# Patient Record
Sex: FEMALE | Race: WHITE | NOT HISPANIC OR LATINO | Employment: OTHER | URBAN - METROPOLITAN AREA
[De-identification: names, ages, dates, MRNs, and addresses within clinical notes are randomized per-mention and may not be internally consistent; named-entity substitution may affect disease eponyms.]

---

## 2019-05-01 ENCOUNTER — TELEPHONE (OUTPATIENT)
Dept: OBGYN CLINIC | Facility: CLINIC | Age: 64
End: 2019-05-01

## 2019-05-21 ENCOUNTER — TELEPHONE (OUTPATIENT)
Dept: OBGYN CLINIC | Facility: CLINIC | Age: 64
End: 2019-05-21

## 2020-04-02 ENCOUNTER — TELEPHONE (OUTPATIENT)
Dept: FAMILY MEDICINE CLINIC | Facility: CLINIC | Age: 65
End: 2020-04-02

## 2020-04-24 ENCOUNTER — TELEMEDICINE (OUTPATIENT)
Dept: FAMILY MEDICINE CLINIC | Facility: CLINIC | Age: 65
End: 2020-04-24
Payer: COMMERCIAL

## 2020-04-24 VITALS — HEIGHT: 60 IN | WEIGHT: 180 LBS | BODY MASS INDEX: 35.34 KG/M2

## 2020-04-24 DIAGNOSIS — B34.9 ACUTE VIRAL SYNDROME: Primary | ICD-10-CM

## 2020-04-24 DIAGNOSIS — Z20.828 EXPOSURE TO SARS-ASSOCIATED CORONAVIRUS: ICD-10-CM

## 2020-04-24 PROBLEM — I83.11 VARICOSE VEINS OF BOTH LOWER EXTREMITIES WITH INFLAMMATION: Status: ACTIVE | Noted: 2020-04-24

## 2020-04-24 PROBLEM — D50.9 IRON DEFICIENCY ANEMIA: Status: ACTIVE | Noted: 2020-04-24

## 2020-04-24 PROBLEM — M17.0 PRIMARY OSTEOARTHRITIS OF BOTH KNEES: Status: ACTIVE | Noted: 2020-04-24

## 2020-04-24 PROBLEM — E55.9 VITAMIN D DEFICIENCY: Status: ACTIVE | Noted: 2020-04-24

## 2020-04-24 PROBLEM — M79.7 FIBROMYALGIA: Status: ACTIVE | Noted: 2020-04-24

## 2020-04-24 PROBLEM — I83.12 VARICOSE VEINS OF BOTH LOWER EXTREMITIES WITH INFLAMMATION: Status: ACTIVE | Noted: 2020-04-24

## 2020-04-24 PROBLEM — E78.00 HYPERCHOLESTEROLEMIA: Status: ACTIVE | Noted: 2020-04-24

## 2020-04-24 PROBLEM — M54.50 CHRONIC BILATERAL LOW BACK PAIN WITHOUT SCIATICA: Status: ACTIVE | Noted: 2020-04-24

## 2020-04-24 PROBLEM — G89.29 CHRONIC BILATERAL LOW BACK PAIN WITHOUT SCIATICA: Status: ACTIVE | Noted: 2020-04-24

## 2020-04-24 PROCEDURE — 99201 PR OFFICE OUTPATIENT NEW 10 MINUTES: CPT | Performed by: FAMILY MEDICINE

## 2020-04-24 PROCEDURE — U0003 INFECTIOUS AGENT DETECTION BY NUCLEIC ACID (DNA OR RNA); SEVERE ACUTE RESPIRATORY SYNDROME CORONAVIRUS 2 (SARS-COV-2) (CORONAVIRUS DISEASE [COVID-19]), AMPLIFIED PROBE TECHNIQUE, MAKING USE OF HIGH THROUGHPUT TECHNOLOGIES AS DESCRIBED BY CMS-2020-01-R: HCPCS

## 2020-04-24 RX ORDER — CYCLOBENZAPRINE HCL 5 MG
5 TABLET ORAL 3 TIMES DAILY PRN
COMMUNITY
End: 2020-06-15 | Stop reason: SDUPTHER

## 2020-04-24 RX ORDER — NAPROXEN 500 MG/1
500 TABLET ORAL AS NEEDED
COMMUNITY
End: 2020-06-15 | Stop reason: SDUPTHER

## 2020-04-24 RX ORDER — MELATONIN
1000 DAILY
COMMUNITY
End: 2021-03-24

## 2020-04-25 LAB — SARS-COV-2 RNA SPEC QL NAA+PROBE: DETECTED

## 2020-04-27 ENCOUNTER — TELEPHONE (OUTPATIENT)
Dept: FAMILY MEDICINE CLINIC | Facility: CLINIC | Age: 65
End: 2020-04-27

## 2020-05-07 ENCOUNTER — TELEPHONE (OUTPATIENT)
Dept: FAMILY MEDICINE CLINIC | Facility: CLINIC | Age: 65
End: 2020-05-07

## 2020-05-08 ENCOUNTER — TELEPHONE (OUTPATIENT)
Dept: FAMILY MEDICINE CLINIC | Facility: CLINIC | Age: 65
End: 2020-05-08

## 2020-05-26 ENCOUNTER — TELEPHONE (OUTPATIENT)
Dept: FAMILY MEDICINE CLINIC | Facility: CLINIC | Age: 65
End: 2020-05-26

## 2020-06-08 ENCOUNTER — TELEPHONE (OUTPATIENT)
Dept: FAMILY MEDICINE CLINIC | Facility: CLINIC | Age: 65
End: 2020-06-08

## 2020-06-09 LAB
25(OH)D3 SERPL-MCNC: 20 NG/ML (ref 30–100)
FERRITIN SERPL-MCNC: 9 NG/ML (ref 16–288)
IRON SATN MFR SERPL: 11 % (CALC) (ref 16–45)
IRON SERPL-MCNC: 46 MCG/DL (ref 45–160)
TIBC SERPL-MCNC: 418 MCG/DL (CALC) (ref 250–450)

## 2020-06-10 ENCOUNTER — TELEPHONE (OUTPATIENT)
Dept: FAMILY MEDICINE CLINIC | Facility: CLINIC | Age: 65
End: 2020-06-10

## 2020-06-11 ENCOUNTER — TRANSCRIBE ORDERS (OUTPATIENT)
Dept: FAMILY MEDICINE CLINIC | Facility: CLINIC | Age: 65
End: 2020-06-11

## 2020-06-11 DIAGNOSIS — I83.813 VARICOSE VEINS OF BILATERAL LOWER EXTREMITIES WITH PAIN: Primary | ICD-10-CM

## 2020-06-12 DIAGNOSIS — E55.9 VITAMIN D DEFICIENCY: ICD-10-CM

## 2020-06-12 DIAGNOSIS — E61.1 LOW IRON: Primary | ICD-10-CM

## 2020-06-15 ENCOUNTER — TELEPHONE (OUTPATIENT)
Dept: FAMILY MEDICINE CLINIC | Facility: CLINIC | Age: 65
End: 2020-06-15

## 2020-06-15 ENCOUNTER — TELEPHONE (OUTPATIENT)
Dept: ADMINISTRATIVE | Facility: HOSPITAL | Age: 65
End: 2020-06-15

## 2020-06-15 ENCOUNTER — OFFICE VISIT (OUTPATIENT)
Dept: FAMILY MEDICINE CLINIC | Facility: CLINIC | Age: 65
End: 2020-06-15
Payer: COMMERCIAL

## 2020-06-15 VITALS
BODY MASS INDEX: 37.09 KG/M2 | DIASTOLIC BLOOD PRESSURE: 70 MMHG | HEART RATE: 84 BPM | TEMPERATURE: 98.2 F | SYSTOLIC BLOOD PRESSURE: 120 MMHG | HEIGHT: 59 IN | WEIGHT: 184 LBS

## 2020-06-15 DIAGNOSIS — R30.0 DYSURIA: Primary | ICD-10-CM

## 2020-06-15 DIAGNOSIS — R52 PAIN: Primary | ICD-10-CM

## 2020-06-15 LAB
SL AMB  POCT GLUCOSE, UA: ABNORMAL
SL AMB LEUKOCYTE ESTERASE,UA: ABNORMAL
SL AMB POCT BILIRUBIN,UA: ABNORMAL
SL AMB POCT BLOOD,UA: ABNORMAL
SL AMB POCT CLARITY,UA: CLEAR
SL AMB POCT COLOR,UA: ABNORMAL
SL AMB POCT KETONES,UA: ABNORMAL
SL AMB POCT NITRITE,UA: ABNORMAL
SL AMB POCT PH,UA: 5
SL AMB POCT SPECIFIC GRAVITY,UA: 1.03
SL AMB POCT URINE PROTEIN: ABNORMAL
SL AMB POCT UROBILINOGEN: ABNORMAL

## 2020-06-15 PROCEDURE — 81003 URINALYSIS AUTO W/O SCOPE: CPT | Performed by: FAMILY MEDICINE

## 2020-06-15 PROCEDURE — 1036F TOBACCO NON-USER: CPT | Performed by: FAMILY MEDICINE

## 2020-06-15 PROCEDURE — 3008F BODY MASS INDEX DOCD: CPT | Performed by: FAMILY MEDICINE

## 2020-06-15 PROCEDURE — 99213 OFFICE O/P EST LOW 20 MIN: CPT | Performed by: FAMILY MEDICINE

## 2020-06-15 RX ORDER — NAPROXEN 500 MG/1
500 TABLET ORAL AS NEEDED
Qty: 30 TABLET | Refills: 3 | Status: ON HOLD | OUTPATIENT
Start: 2020-06-15 | End: 2020-08-14

## 2020-06-15 RX ORDER — CYCLOBENZAPRINE HCL 5 MG
5 TABLET ORAL 3 TIMES DAILY PRN
Qty: 30 TABLET | Refills: 3 | Status: SHIPPED | OUTPATIENT
Start: 2020-06-15 | End: 2021-07-27 | Stop reason: SDUPTHER

## 2020-06-15 RX ORDER — CYCLOBENZAPRINE HCL 5 MG
5 TABLET ORAL 3 TIMES DAILY PRN
COMMUNITY
End: 2020-06-15 | Stop reason: SDUPTHER

## 2020-06-15 RX ORDER — NITROFURANTOIN 25; 75 MG/1; MG/1
100 CAPSULE ORAL 2 TIMES DAILY
Qty: 10 CAPSULE | Refills: 0 | Status: SHIPPED | OUTPATIENT
Start: 2020-06-15 | End: 2020-06-20

## 2020-06-16 ENCOUNTER — OFFICE VISIT (OUTPATIENT)
Dept: VASCULAR SURGERY | Facility: CLINIC | Age: 65
End: 2020-06-16
Payer: COMMERCIAL

## 2020-06-16 VITALS
DIASTOLIC BLOOD PRESSURE: 80 MMHG | BODY MASS INDEX: 36.89 KG/M2 | SYSTOLIC BLOOD PRESSURE: 140 MMHG | HEIGHT: 59 IN | WEIGHT: 183 LBS | RESPIRATION RATE: 16 BRPM

## 2020-06-16 DIAGNOSIS — I83.11 VARICOSE VEINS OF BOTH LOWER EXTREMITIES WITH INFLAMMATION: ICD-10-CM

## 2020-06-16 DIAGNOSIS — I83.12 VARICOSE VEINS OF BOTH LOWER EXTREMITIES WITH INFLAMMATION: ICD-10-CM

## 2020-06-16 DIAGNOSIS — I83.813 VARICOSE VEINS OF BILATERAL LOWER EXTREMITIES WITH PAIN: Primary | ICD-10-CM

## 2020-06-16 PROCEDURE — 99243 OFF/OP CNSLTJ NEW/EST LOW 30: CPT | Performed by: NURSE PRACTITIONER

## 2020-08-14 ENCOUNTER — HOSPITAL ENCOUNTER (INPATIENT)
Facility: HOSPITAL | Age: 65
LOS: 3 days | Discharge: HOME | DRG: 872 | End: 2020-08-17
Attending: EMERGENCY MEDICINE | Admitting: INTERNAL MEDICINE
Payer: COMMERCIAL

## 2020-08-14 ENCOUNTER — APPOINTMENT (EMERGENCY)
Dept: RADIOLOGY | Facility: HOSPITAL | Age: 65
DRG: 872 | End: 2020-08-14
Payer: COMMERCIAL

## 2020-08-14 DIAGNOSIS — N39.0 UTI (URINARY TRACT INFECTION): ICD-10-CM

## 2020-08-14 DIAGNOSIS — R65.10 SIRS (SYSTEMIC INFLAMMATORY RESPONSE SYNDROME) (HCC): Primary | ICD-10-CM

## 2020-08-14 DIAGNOSIS — R05.9 COUGH: ICD-10-CM

## 2020-08-14 DIAGNOSIS — K52.9 GASTROENTERITIS: ICD-10-CM

## 2020-08-14 LAB
ALBUMIN SERPL BCP-MCNC: 3.7 G/DL (ref 3.4–4.8)
ALP SERPL-CCNC: 95.4 U/L (ref 35–140)
ALT SERPL W P-5'-P-CCNC: 17 U/L (ref 5–54)
ANION GAP SERPL CALCULATED.3IONS-SCNC: 11 MMOL/L (ref 4–13)
AST SERPL W P-5'-P-CCNC: 28 U/L (ref 15–41)
BACTERIA UR QL AUTO: ABNORMAL /HPF
BASOPHILS # BLD AUTO: 0.02 THOUSANDS/ΜL (ref 0–0.1)
BASOPHILS NFR BLD AUTO: 0 % (ref 0–1)
BILIRUB SERPL-MCNC: 0.5 MG/DL (ref 0.3–1.2)
BILIRUB UR QL STRIP: NEGATIVE
BNP SERPL-MCNC: 22.2 PG/ML (ref 1–100)
BUN SERPL-MCNC: 15 MG/DL (ref 6–20)
CALCIUM SERPL-MCNC: 9.3 MG/DL (ref 8.4–10.2)
CHLORIDE SERPL-SCNC: 104 MMOL/L (ref 96–108)
CLARITY UR: CLEAR
CO2 SERPL-SCNC: 22 MMOL/L (ref 22–33)
COLOR UR: YELLOW
CREAT SERPL-MCNC: 0.89 MG/DL (ref 0.4–1.1)
CRP SERPL QL: 13.7 MG/L (ref 0–1)
EOSINOPHIL # BLD AUTO: 0.01 THOUSAND/ΜL (ref 0–0.61)
EOSINOPHIL NFR BLD AUTO: 0 % (ref 0–6)
ERYTHROCYTE [DISTWIDTH] IN BLOOD BY AUTOMATED COUNT: 15.6 % (ref 11.6–15.1)
FERRITIN SERPL-MCNC: 96 NG/ML (ref 8–388)
FLUAV RNA NPH QL NAA+PROBE: NORMAL
FLUBV RNA NPH QL NAA+PROBE: NORMAL
GFR SERPL CREATININE-BSD FRML MDRD: 69 ML/MIN/1.73SQ M
GLUCOSE SERPL-MCNC: 138 MG/DL (ref 65–140)
GLUCOSE UR STRIP-MCNC: ABNORMAL MG/DL
HCT VFR BLD AUTO: 38.9 % (ref 34.8–46.1)
HGB BLD-MCNC: 12.4 G/DL (ref 11.5–15.4)
HGB UR QL STRIP.AUTO: ABNORMAL
IMM GRANULOCYTES # BLD AUTO: 0.01 THOUSAND/UL (ref 0–0.2)
IMM GRANULOCYTES NFR BLD AUTO: 0 % (ref 0–2)
KETONES UR STRIP-MCNC: NEGATIVE MG/DL
LACTATE SERPL-SCNC: 2 MMOL/L (ref 0–2)
LEUKOCYTE ESTERASE UR QL STRIP: NEGATIVE
LYMPHOCYTES # BLD AUTO: 1.48 THOUSANDS/ΜL (ref 0.6–4.47)
LYMPHOCYTES NFR BLD AUTO: 24 % (ref 14–44)
MAGNESIUM SERPL-MCNC: 2 MG/DL (ref 1.6–2.6)
MCH RBC QN AUTO: 30.8 PG (ref 26.8–34.3)
MCHC RBC AUTO-ENTMCNC: 31.9 G/DL (ref 31.4–37.4)
MCV RBC AUTO: 97 FL (ref 82–98)
MONOCYTES # BLD AUTO: 0.75 THOUSAND/ΜL (ref 0.17–1.22)
MONOCYTES NFR BLD AUTO: 12 % (ref 4–12)
NEUTROPHILS # BLD AUTO: 3.98 THOUSANDS/ΜL (ref 1.85–7.62)
NEUTS SEG NFR BLD AUTO: 64 % (ref 43–75)
NITRITE UR QL STRIP: POSITIVE
NON-SQ EPI CELLS URNS QL MICRO: ABNORMAL /HPF
PH UR STRIP.AUTO: 6 [PH]
PLATELET # BLD AUTO: 270 THOUSANDS/UL (ref 149–390)
PMV BLD AUTO: 10 FL (ref 8.9–12.7)
POTASSIUM SERPL-SCNC: 3.6 MMOL/L (ref 3.5–5)
PROT SERPL-MCNC: 7.2 G/DL (ref 6.4–8.3)
PROT UR STRIP-MCNC: ABNORMAL MG/DL
RBC # BLD AUTO: 4.03 MILLION/UL (ref 3.81–5.12)
RBC #/AREA URNS AUTO: ABNORMAL /HPF
RSV RNA NPH QL NAA+PROBE: NORMAL
SARS-COV-2 RNA RESP QL NAA+PROBE: NEGATIVE
SODIUM SERPL-SCNC: 137 MMOL/L (ref 133–145)
SP GR UR STRIP.AUTO: 1.02 (ref 1–1.03)
TROPONIN I SERPL-MCNC: <0.03 NG/ML (ref 0–0.07)
UROBILINOGEN UR QL STRIP.AUTO: 2 E.U./DL
WBC # BLD AUTO: 6.25 THOUSAND/UL (ref 4.31–10.16)
WBC #/AREA URNS AUTO: ABNORMAL /HPF

## 2020-08-14 PROCEDURE — 87635 SARS-COV-2 COVID-19 AMP PRB: CPT | Performed by: PHYSICIAN ASSISTANT

## 2020-08-14 PROCEDURE — 99285 EMERGENCY DEPT VISIT HI MDM: CPT

## 2020-08-14 PROCEDURE — 80053 COMPREHEN METABOLIC PANEL: CPT | Performed by: PHYSICIAN ASSISTANT

## 2020-08-14 PROCEDURE — 81001 URINALYSIS AUTO W/SCOPE: CPT | Performed by: PHYSICIAN ASSISTANT

## 2020-08-14 PROCEDURE — 99223 1ST HOSP IP/OBS HIGH 75: CPT | Performed by: INTERNAL MEDICINE

## 2020-08-14 PROCEDURE — 87631 RESP VIRUS 3-5 TARGETS: CPT | Performed by: PHYSICIAN ASSISTANT

## 2020-08-14 PROCEDURE — 86140 C-REACTIVE PROTEIN: CPT | Performed by: PHYSICIAN ASSISTANT

## 2020-08-14 PROCEDURE — 83735 ASSAY OF MAGNESIUM: CPT | Performed by: PHYSICIAN ASSISTANT

## 2020-08-14 PROCEDURE — 83880 ASSAY OF NATRIURETIC PEPTIDE: CPT | Performed by: PHYSICIAN ASSISTANT

## 2020-08-14 PROCEDURE — 93005 ELECTROCARDIOGRAM TRACING: CPT

## 2020-08-14 PROCEDURE — 82728 ASSAY OF FERRITIN: CPT | Performed by: PHYSICIAN ASSISTANT

## 2020-08-14 PROCEDURE — 84484 ASSAY OF TROPONIN QUANT: CPT | Performed by: PHYSICIAN ASSISTANT

## 2020-08-14 PROCEDURE — 83605 ASSAY OF LACTIC ACID: CPT | Performed by: PHYSICIAN ASSISTANT

## 2020-08-14 PROCEDURE — 99285 EMERGENCY DEPT VISIT HI MDM: CPT | Performed by: PHYSICIAN ASSISTANT

## 2020-08-14 PROCEDURE — 71045 X-RAY EXAM CHEST 1 VIEW: CPT

## 2020-08-14 PROCEDURE — 36415 COLL VENOUS BLD VENIPUNCTURE: CPT | Performed by: PHYSICIAN ASSISTANT

## 2020-08-14 PROCEDURE — 87186 SC STD MICRODIL/AGAR DIL: CPT | Performed by: INTERNAL MEDICINE

## 2020-08-14 PROCEDURE — 96360 HYDRATION IV INFUSION INIT: CPT

## 2020-08-14 PROCEDURE — 87077 CULTURE AEROBIC IDENTIFY: CPT | Performed by: INTERNAL MEDICINE

## 2020-08-14 PROCEDURE — 85025 COMPLETE CBC W/AUTO DIFF WBC: CPT | Performed by: PHYSICIAN ASSISTANT

## 2020-08-14 PROCEDURE — 87086 URINE CULTURE/COLONY COUNT: CPT | Performed by: INTERNAL MEDICINE

## 2020-08-14 PROCEDURE — 87040 BLOOD CULTURE FOR BACTERIA: CPT | Performed by: PHYSICIAN ASSISTANT

## 2020-08-14 PROCEDURE — 87181 SC STD AGAR DILUTION PER AGT: CPT | Performed by: INTERNAL MEDICINE

## 2020-08-14 RX ORDER — NAPROXEN 250 MG/1
250 TABLET ORAL EVERY 8 HOURS PRN
Status: DISCONTINUED | OUTPATIENT
Start: 2020-08-14 | End: 2020-08-17 | Stop reason: HOSPADM

## 2020-08-14 RX ORDER — KETOROLAC TROMETHAMINE 30 MG/ML
15 INJECTION, SOLUTION INTRAMUSCULAR; INTRAVENOUS ONCE
Status: COMPLETED | OUTPATIENT
Start: 2020-08-14 | End: 2020-08-15

## 2020-08-14 RX ORDER — FERROUS SULFATE 325(65) MG
325 TABLET ORAL
Status: DISCONTINUED | OUTPATIENT
Start: 2020-08-15 | End: 2020-08-17 | Stop reason: HOSPADM

## 2020-08-14 RX ORDER — ACETAMINOPHEN 325 MG/1
650 TABLET ORAL EVERY 6 HOURS PRN
Status: DISCONTINUED | OUTPATIENT
Start: 2020-08-14 | End: 2020-08-17 | Stop reason: HOSPADM

## 2020-08-14 RX ORDER — CYCLOBENZAPRINE HCL 10 MG
5 TABLET ORAL 3 TIMES DAILY PRN
Status: DISCONTINUED | OUTPATIENT
Start: 2020-08-14 | End: 2020-08-17 | Stop reason: HOSPADM

## 2020-08-14 RX ORDER — MELATONIN
1000 DAILY
Status: DISCONTINUED | OUTPATIENT
Start: 2020-08-15 | End: 2020-08-17 | Stop reason: HOSPADM

## 2020-08-14 RX ORDER — LOPERAMIDE HCL 1 MG/7.5ML
2 SUSPENSION ORAL 3 TIMES DAILY PRN
Status: DISCONTINUED | OUTPATIENT
Start: 2020-08-14 | End: 2020-08-17 | Stop reason: HOSPADM

## 2020-08-14 RX ORDER — ONDANSETRON 2 MG/ML
4 INJECTION INTRAMUSCULAR; INTRAVENOUS EVERY 8 HOURS PRN
Status: DISCONTINUED | OUTPATIENT
Start: 2020-08-14 | End: 2020-08-17 | Stop reason: HOSPADM

## 2020-08-14 RX ORDER — SODIUM CHLORIDE, SODIUM LACTATE, POTASSIUM CHLORIDE, CALCIUM CHLORIDE 600; 310; 30; 20 MG/100ML; MG/100ML; MG/100ML; MG/100ML
100 INJECTION, SOLUTION INTRAVENOUS CONTINUOUS
Status: DISCONTINUED | OUTPATIENT
Start: 2020-08-14 | End: 2020-08-15

## 2020-08-14 RX ORDER — CEFTRIAXONE 1 G/50ML
1000 INJECTION, SOLUTION INTRAVENOUS ONCE
Status: COMPLETED | OUTPATIENT
Start: 2020-08-14 | End: 2020-08-15

## 2020-08-14 RX ORDER — CEFTRIAXONE 1 G/50ML
1000 INJECTION, SOLUTION INTRAVENOUS EVERY 24 HOURS
Status: DISCONTINUED | OUTPATIENT
Start: 2020-08-15 | End: 2020-08-17 | Stop reason: HOSPADM

## 2020-08-14 RX ORDER — BENZONATATE 100 MG/1
100 CAPSULE ORAL ONCE
Status: COMPLETED | OUTPATIENT
Start: 2020-08-14 | End: 2020-08-14

## 2020-08-14 RX ADMIN — SODIUM CHLORIDE 2000 ML: 0.9 INJECTION, SOLUTION INTRAVENOUS at 21:15

## 2020-08-14 RX ADMIN — BENZONATATE 100 MG: 100 CAPSULE ORAL at 21:52

## 2020-08-15 LAB
ANION GAP SERPL CALCULATED.3IONS-SCNC: 6 MMOL/L (ref 4–13)
ATRIAL RATE: 84 BPM
BASOPHILS # BLD AUTO: 0.01 THOUSANDS/ΜL (ref 0–0.1)
BASOPHILS NFR BLD AUTO: 0 % (ref 0–1)
BUN SERPL-MCNC: 14 MG/DL (ref 6–20)
CALCIUM SERPL-MCNC: 8.3 MG/DL (ref 8.4–10.2)
CHLORIDE SERPL-SCNC: 109 MMOL/L (ref 96–108)
CO2 SERPL-SCNC: 24 MMOL/L (ref 22–33)
CREAT SERPL-MCNC: 0.68 MG/DL (ref 0.4–1.1)
EOSINOPHIL # BLD AUTO: 0.03 THOUSAND/ΜL (ref 0–0.61)
EOSINOPHIL NFR BLD AUTO: 1 % (ref 0–6)
ERYTHROCYTE [DISTWIDTH] IN BLOOD BY AUTOMATED COUNT: 15.7 % (ref 11.6–15.1)
GFR SERPL CREATININE-BSD FRML MDRD: 93 ML/MIN/1.73SQ M
GLUCOSE SERPL-MCNC: 103 MG/DL (ref 65–140)
HCT VFR BLD AUTO: 32.2 % (ref 34.8–46.1)
HGB BLD-MCNC: 10.3 G/DL (ref 11.5–15.4)
IMM GRANULOCYTES # BLD AUTO: 0.02 THOUSAND/UL (ref 0–0.2)
IMM GRANULOCYTES NFR BLD AUTO: 0 % (ref 0–2)
LYMPHOCYTES # BLD AUTO: 1.84 THOUSANDS/ΜL (ref 0.6–4.47)
LYMPHOCYTES NFR BLD AUTO: 28 % (ref 14–44)
MCH RBC QN AUTO: 30.9 PG (ref 26.8–34.3)
MCHC RBC AUTO-ENTMCNC: 32 G/DL (ref 31.4–37.4)
MCV RBC AUTO: 97 FL (ref 82–98)
MONOCYTES # BLD AUTO: 0.58 THOUSAND/ΜL (ref 0.17–1.22)
MONOCYTES NFR BLD AUTO: 9 % (ref 4–12)
NEUTROPHILS # BLD AUTO: 4.14 THOUSANDS/ΜL (ref 1.85–7.62)
NEUTS SEG NFR BLD AUTO: 62 % (ref 43–75)
P AXIS: 32 DEGREES
PLATELET # BLD AUTO: 216 THOUSANDS/UL (ref 149–390)
PMV BLD AUTO: 10.1 FL (ref 8.9–12.7)
POTASSIUM SERPL-SCNC: 3.9 MMOL/L (ref 3.5–5)
PR INTERVAL: 120 MS
PROCALCITONIN SERPL-MCNC: 1.18 NG/ML
QRS AXIS: 43 DEGREES
QRSD INTERVAL: 82 MS
QT INTERVAL: 332 MS
QTC INTERVAL: 393 MS
RBC # BLD AUTO: 3.33 MILLION/UL (ref 3.81–5.12)
SODIUM SERPL-SCNC: 139 MMOL/L (ref 133–145)
T WAVE AXIS: 26 DEGREES
VENTRICULAR RATE: 84 BPM
WBC # BLD AUTO: 6.62 THOUSAND/UL (ref 4.31–10.16)

## 2020-08-15 PROCEDURE — 84145 PROCALCITONIN (PCT): CPT | Performed by: INTERNAL MEDICINE

## 2020-08-15 PROCEDURE — 93010 ELECTROCARDIOGRAM REPORT: CPT | Performed by: INTERNAL MEDICINE

## 2020-08-15 PROCEDURE — 80048 BASIC METABOLIC PNL TOTAL CA: CPT | Performed by: INTERNAL MEDICINE

## 2020-08-15 PROCEDURE — 85025 COMPLETE CBC W/AUTO DIFF WBC: CPT | Performed by: INTERNAL MEDICINE

## 2020-08-15 PROCEDURE — 99232 SBSQ HOSP IP/OBS MODERATE 35: CPT | Performed by: INTERNAL MEDICINE

## 2020-08-15 RX ORDER — IBUPROFEN 600 MG/1
600 TABLET ORAL ONCE
Status: COMPLETED | OUTPATIENT
Start: 2020-08-15 | End: 2020-08-15

## 2020-08-15 RX ORDER — GUAIFENESIN/DEXTROMETHORPHAN 100-10MG/5
10 SYRUP ORAL EVERY 6 HOURS PRN
Status: DISCONTINUED | OUTPATIENT
Start: 2020-08-15 | End: 2020-08-17 | Stop reason: HOSPADM

## 2020-08-15 RX ADMIN — SODIUM CHLORIDE, SODIUM LACTATE, POTASSIUM CHLORIDE, AND CALCIUM CHLORIDE 100 ML/HR: .6; .31; .03; .02 INJECTION, SOLUTION INTRAVENOUS at 00:12

## 2020-08-15 RX ADMIN — ACETAMINOPHEN 650 MG: 325 TABLET, FILM COATED ORAL at 00:27

## 2020-08-15 RX ADMIN — ACETAMINOPHEN 650 MG: 325 TABLET, FILM COATED ORAL at 21:21

## 2020-08-15 RX ADMIN — KETOROLAC TROMETHAMINE 15 MG: 30 INJECTION, SOLUTION INTRAMUSCULAR at 00:09

## 2020-08-15 RX ADMIN — ENOXAPARIN SODIUM 40 MG: 40 INJECTION SUBCUTANEOUS at 17:00

## 2020-08-15 RX ADMIN — GUAIFENESIN AND DEXTROMETHORPHAN 10 ML: 100; 10 SYRUP ORAL at 22:47

## 2020-08-15 RX ADMIN — CEFTRIAXONE 1000 MG: 1 INJECTION, SOLUTION INTRAVENOUS at 08:54

## 2020-08-15 RX ADMIN — SODIUM CHLORIDE, SODIUM LACTATE, POTASSIUM CHLORIDE, AND CALCIUM CHLORIDE 100 ML/HR: .6; .31; .03; .02 INJECTION, SOLUTION INTRAVENOUS at 14:28

## 2020-08-15 RX ADMIN — IBUPROFEN 600 MG: 600 TABLET ORAL at 09:38

## 2020-08-15 RX ADMIN — CEFTRIAXONE 1000 MG: 1 INJECTION, SOLUTION INTRAVENOUS at 00:09

## 2020-08-15 RX ADMIN — VITAMIN D, TAB 1000IU (100/BT) 1000 UNITS: 25 TAB at 08:51

## 2020-08-15 RX ADMIN — ENOXAPARIN SODIUM 40 MG: 40 INJECTION SUBCUTANEOUS at 08:52

## 2020-08-15 RX ADMIN — FERROUS SULFATE TAB 325 MG (65 MG ELEMENTAL FE) 325 MG: 325 (65 FE) TAB at 08:51

## 2020-08-15 NOTE — ASSESSMENT & PLAN NOTE
SIRS possibly attributed by acute viral syndrome  -present with fever, dry cough, headache, sinus pressure, nasal drip  -COVID was negative, influenza negative   -CXR - no evidence of acute cardiopulmonary process  -continue supportive management

## 2020-08-15 NOTE — ED PROVIDER NOTES
History  Chief Complaint   Patient presents with    Fever - 9 weeks to 74 years     Fever, cough, chills, diarrhea, muscle aches x 2 days  Tested +covid 325     60year-old female comes in today complaining of fever, chills, cough, dyspnea on exertion, loss of taste, occasional abdominal pain, retching, and diarrhea for 2 days  She is mostly concerned about her frontal headache which is currently a 9 5/10 and her cough  Her cough is non-productive  She also complains that her urine has been thick  She reports that her urine with thick a few weeks ago and her primary care physician treated her with antibiotics and seemed to get better  She denies any dysuria  Prior to Admission Medications   Prescriptions Last Dose Informant Patient Reported? Taking? Ferrous Sulfate (SLOW FE PO)   Yes No   Sig: Take by mouth   cholecalciferol (VITAMIN D3) 1,000 units tablet   Yes No   Sig: Take 1,000 Units by mouth daily    cyclobenzaprine (FLEXERIL) 5 mg tablet   No No   Sig: Take 1 tablet (5 mg total) by mouth 3 (three) times a day as needed for muscle spasms   naproxen (NAPROSYN) 500 mg tablet   No No   Sig: Take 1 tablet (500 mg total) by mouth as needed for mild pain      Facility-Administered Medications: None       No past medical history on file  Past Surgical History:   Procedure Laterality Date    CHOLECYSTECTOMY      GASTRIC BYPASS      MAMMO (HISTORICAL)  05/2012    MEDIASTINOSCOPY         Family History   Problem Relation Age of Onset    Hypertension Mother     Diabetes Mother     COPD Mother     Stroke Father     Heart disease Father     Diabetes Father     Hypertension Father     Hypertension Maternal Grandmother     Diabetes Maternal Aunt      I have reviewed and agree with the history as documented      E-Cigarette/Vaping    E-Cigarette Use Never User      E-Cigarette/Vaping Substances     Social History     Tobacco Use    Smoking status: Never Smoker    Smokeless tobacco: Never Used   Substance Use Topics    Alcohol use: Not Currently    Drug use: Never     Comment: Hx: Over 30 years ago  Review of Systems   Constitutional: Positive for chills, fatigue and fever  HENT: Negative for ear pain, rhinorrhea and sore throat  Eyes: Negative for visual disturbance  Respiratory: Positive for shortness of breath  Negative for cough  Cardiovascular: Negative for chest pain  Gastrointestinal: Positive for abdominal pain, diarrhea, nausea and vomiting  Genitourinary: Positive for difficulty urinating  Musculoskeletal: Negative for back pain  Skin: Negative for rash  Neurological: Positive for headaches  Physical Exam  Physical Exam  Constitutional:       General: She is not in acute distress  Appearance: She is well-developed and normal weight  She is ill-appearing  She is not toxic-appearing  HENT:      Head: Normocephalic and atraumatic  Eyes:      Conjunctiva/sclera: Conjunctivae normal    Cardiovascular:      Rate and Rhythm: Normal rate and regular rhythm  Heart sounds: Normal heart sounds  No murmur  Pulmonary:      Effort: Pulmonary effort is normal  No respiratory distress  Breath sounds: Normal breath sounds  Comments: Repetitive dry cough  Abdominal:      General: Bowel sounds are normal       Palpations: Abdomen is soft  Tenderness: There is abdominal tenderness (mild suprapubic tenderness)  Musculoskeletal: Normal range of motion  Skin:     General: Skin is warm and dry  Findings: No rash  Neurological:      General: No focal deficit present  Mental Status: She is alert and oriented to person, place, and time     Psychiatric:         Behavior: Behavior normal          Vital Signs  ED Triage Vitals   Temperature Pulse Respirations Blood Pressure SpO2   08/14/20 2003 08/14/20 2003 08/14/20 2003 08/14/20 2003 08/14/20 2003   98 7 °F (37 1 °C) 104 20 (!) 128/47 100 %      Temp Source Heart Rate Source Patient Position - Orthostatic VS BP Location FiO2 (%)   08/14/20 2003 08/14/20 2003 08/14/20 2003 08/14/20 2003 --   Oral Monitor Sitting Right arm       Pain Score       08/14/20 2149       6           Vitals:    08/14/20 2003 08/14/20 2149   BP: (!) 128/47 135/57   Pulse: 104 96   Patient Position - Orthostatic VS: Sitting Lying         Visual Acuity      ED Medications  Medications   ketorolac (TORADOL) injection 15 mg (has no administration in time range)   cefTRIAXone (ROCEPHIN) IVPB (premix) 1,000 mg 50 mL (has no administration in time range)   sodium chloride 0 9 % bolus 2,000 mL (2,000 mL Intravenous New Bag 8/14/20 2115)   benzonatate (TESSALON PERLES) capsule 100 mg (100 mg Oral Given 8/14/20 2152)       Diagnostic Studies  Results Reviewed     Procedure Component Value Units Date/Time    Urine Microscopic [[id number]]  (Abnormal) Collected:  08/14/20 2221    Lab Status:  Final result Specimen:  Urine Updated:  08/14/20 2248     RBC, UA 1-2 /hpf      WBC, UA 4-10 /hpf      Epithelial Cells Occasional /hpf      Bacteria, UA Moderate /hpf     UA w Reflex to Microscopic w Reflex to Culture [[id number]]  (Abnormal) Collected:  08/14/20 2221    Lab Status:  Final result Specimen:  Urine Updated:  08/14/20 2241     Color, UA Yellow     Clarity, UA Clear     Specific Gravity, UA 1 025     pH, UA 6 0     Leukocytes, UA Negative     Nitrite, UA Positive     Protein, UA 1+ mg/dl      Glucose, UA Trace mg/dl      Ketones, UA Negative mg/dl      Urobilinogen, UA 2 0 E U /dl      Bilirubin, UA Negative     Blood, UA Trace-Intact    [facility] Coronavirus [facility] [[id number]]  (Normal) Collected:  08/14/20 2035    Lab Status:  Final result Specimen:  Nares from Nasopharyngeal Swab Updated:  08/14/20 2203     SARS-CoV-2 Negative    Narrative:        The specimen collection materials, transport medium, and/or testing methodology utilized in the production of these test results have been proven to be reliable in a limited validation with an abbreviated program under the Emergency Utilization Authorization provided by the FDA  Testing reported as "Presumptive positive" will be confirmed with secondary testing with a reference laboratory to ensure result accuracy  Clinical caution and judgement should be used with the interpretation of these results with consideration of the clinical impression and other laboratory testing  Testing reported as "Positive" or "Negative" has been proven to be accurate according to standard laboratory validation requirements  All testing is performed with control materials showing appropriate reactivity at standard intervals        B-Type Natriuretic Peptide ([address]) [[id number]]  (Normal) Collected:  08/14/20 2052    Lab Status:  Final result Specimen:  Blood from Arm, Right Updated:  08/14/20 2150     BNP 22 2 pg/mL     Influenza A/B and RSV PCR [[id number]]  (Normal) Collected:  08/14/20 2035    Lab Status:  Final result Specimen:  Nasopharyngeal Swab Updated:  08/14/20 2149     INFLUENZA A PCR None Detected     INFLUENZA B PCR None Detected     RSV PCR None Detected    C-reactive protein [[id number]]  (Abnormal) Collected:  08/14/20 2052    Lab Status:  Final result Specimen:  Blood from Arm, Right Updated:  08/14/20 2134     CRP 13 7 mg/L     Magnesium [[id number]]  (Normal) Collected:  08/14/20 2052    Lab Status:  Final result Specimen:  Blood from Arm, Right Updated:  08/14/20 2134     Magnesium 2 0 mg/dL     Comprehensive metabolic panel [[id number]] Collected:  08/14/20 2052    Lab Status:  Final result Specimen:  Blood from Arm, Right Updated:  08/14/20 2134     Sodium 137 mmol/L      Potassium 3 6 mmol/L      Chloride 104 mmol/L      CO2 22 mmol/L      ANION GAP 11 mmol/L      BUN 15 mg/dL      Creatinine 0 89 mg/dL      Glucose 138 mg/dL      Calcium 9 3 mg/dL      AST 28 U/L      ALT 17 U/L      Alkaline Phosphatase 95 4 U/L      Total Protein 7 2 g/dL      Albumin 3 7 g/dL Total Bilirubin 0 50 mg/dL      eGFR 69 ml/min/1 73sq m     Narrative:       [place] guidelines for Chronic Kidney Disease (CKD):     Stage 1 with normal or high GFR (GFR > 90 mL/min/1 73 square meters)    Stage 2 Mild CKD (GFR = 60-89 mL/min/1 73 square meters)    Stage 3A Moderate CKD (GFR = 45-59 mL/min/1 73 square meters)    Stage 3B Moderate CKD (GFR = 30-44 mL/min/1 73 square meters)    Stage 4 Severe CKD (GFR = 15-29 mL/min/1 73 square meters)    Stage 5 End Stage CKD (GFR <15 mL/min/1 73 square meters)  Note: GFR calculation is accurate only with a steady state creatinine    Lactic acid, plasma [[id number]]  (Normal) Collected:  08/14/20 2037    Lab Status:  Final result Specimen:  Blood from Arm, Right Updated:  08/14/20 2116     LACTIC ACID 2 0 mmol/L     Narrative:       Result may be elevated if tourniquet was used during collection      Troponin I [[id number]]  (Normal) Collected:  08/14/20 2038    Lab Status:  Final result Specimen:  Blood from Arm, Right Updated:  08/14/20 2116     Troponin I <0 03 ng/mL     CBC and differential [[id number]]  (Abnormal) Collected:  08/14/20 2037    Lab Status:  Final result Specimen:  Blood from Arm, Right Updated:  08/14/20 2054     WBC 6 25 Thousand/uL      RBC 4 03 Million/uL      Hemoglobin 12 4 g/dL      Hematocrit 38 9 %      MCV 97 fL      MCH 30 8 pg      MCHC 31 9 g/dL      RDW 15 6 %      MPV 10 0 fL      Platelets 130 Thousands/uL      Neutrophils Relative 64 %      Immat GRANS % 0 %      Lymphocytes Relative 24 %      Monocytes Relative 12 %      Eosinophils Relative 0 %      Basophils Relative 0 %      Neutrophils Absolute 3 98 Thousands/µL      Immature Grans Absolute 0 01 Thousand/uL      Lymphocytes Absolute 1 48 Thousands/µL      Monocytes Absolute 0 75 Thousand/µL      Eosinophils Absolute 0 01 Thousand/µL      Basophils Absolute 0 02 Thousands/µL     Blood culture #1 [[id number]] Collected:  08/14/20 2045    Lab Status: In process Specimen:  Blood from Arm, Right Updated:  08/14/20 2053    Blood culture #2 [[id number]] Collected:  08/14/20 2045    Lab Status: In process Specimen:  Blood from Arm, Left Updated:  08/14/20 2053    Ferritin [[id number]] Collected:  08/14/20 2038    Lab Status: In process Specimen:  Blood from Arm, Right Updated:  08/14/20 2051                 XR chest 1 view   Final Result by Pradip Saldivar MD (08/14 2157)      No acute cardiopulmonary disease  Workstation performed: [id number]                    Procedures  Procedures         ED Course  ED Course as of Aug 14 2249   Fri Aug 14, 2020   2057 EKG performed at 8:56 p m  Shows normal sinus rhythm with a rate of 84, normal axis, no ectopy, no significant ST elevations          US AUDIT      Most Recent Value   Initial Alcohol Screen: US AUDIT-C    1  How often do you have a drink containing alcohol?  0 Filed at: 08/14/2020 2015   2  How many drinks containing alcohol do you have on a typical day you are drinking? 0 Filed at: 08/14/2020 2015   3b  FEMALE Any Age, or MALE 65+: How often do you have 4 or more drinks on one occassion? 0 Filed at: 08/14/2020 2015   Audit-C Score  0 Filed at: 08/14/2020 2015                  HALEY/DAST-10      Most Recent Value   How many times in the past year have you    Used an illegal drug or used a prescription medication for non-medical reasons?   Never Filed at: 08/14/2020 2015                                [facility]      Disposition  Final diagnoses:   SIRS (systemic inflammatory response syndrome) (HCC)   Cough   Gastroenteritis   UTI (urinary tract infection)     Time reflects when diagnosis was documented in both MDM as applicable and the Disposition within this note     Time User Action Codes Description Comment    8/14/2020 10:33 PM Benjamín Carmen Add [R65 10] SIRS (systemic inflammatory response syndrome) ([facility] [address] )     8/14/2020 10:35 PM Benjamín Carmen Add [R05] Cough     8/14/2020 10:35 PM Keeley Ramos Add [K52 9] Gastroenteritis     8/14/2020 10:43 PM Andrew Balbuena Add [N39 0] UTI (urinary tract infection)       ED Disposition     ED Disposition Condition Date/Time Comment    Admit Stable Fri Aug 14, 2020 10:33 PM Case was discussed with Dr Reji Ortiz and the patient's admission status was agreed to be Admission Status: inpatient status to the service of Dr Brennan Arroyo  Follow-up Information    None         Patient's Medications   Discharge Prescriptions    No medications on file     No discharge procedures on file      PDMP Review     None          ED Provider  Electronically Signed by           Adilia Major PA-C  08/14/20 0589

## 2020-08-15 NOTE — PLAN OF CARE
Problem: Potential for Falls  Goal: Patient will remain free of falls  Description: INTERVENTIONS:  - Assess patient frequently for physical needs  -  Identify cognitive and physical deficits and behaviors that affect risk of falls    -  Quentin fall precautions as indicated by assessment   - Educate patient/family on patient safety including physical limitations  - Instruct patient to call for assistance with activity based on assessment    Outcome: Progressing

## 2020-08-15 NOTE — H&P
H&P- Glenn Hooper 1955, 60 y o  female MRN: [id number]    Unit/Bed#: -01 Encounter: [id number]    Primary Care Provider: Latisha Holden MD   Date and time admitted to hospital: 8/14/2020  8:02 PM        Varicose veins of bilateral lower extremities with pain  Assessment & Plan  -occasional leg cramps to do varicose vein  -Continue Flexeril as needed    Acute viral syndrome  Assessment & Plan    SIRS possibly attributed by acute viral syndrome  -present with fever, dry cough, headache, sinus pressure, nasal drip  -COVID was negative, influenza negative   -CXR - no evidence of acute cardiopulmonary process  -continue supportive management    Iron deficiency anemia  Assessment & Plan  Continue iron sulfate    Vitamin D deficiency  Assessment & Plan  Continue vitamin-D supplement    * SIRS (systemic inflammatory response syndrome) (HCC)  Assessment & Plan  SIRS most likely due to UTI  -no evidence of organ dysfuction  -history of thick mucus urine - almost a month ago, treated with outpatient antibiotic (can't recall antibiotic name)  -patient denies dysuria, frequency, urgency, foul-smelling urine  -UA positive for nitrite, trace microscopic hematuria, glycosuria, 1+ proteinurisa  -Influenza negative, COVID negative  -No leukocytosis, normal lactic acid  -Electrolytes WNL, no renal function  -CRP 13 7, troponin negative, Danyell BNP  -no CVA tenderness    Plan  -received total 2 L NS bolus in ED  -Continue IV  cc/hour  -Follow urine culture  -Status post 1 dose of IV ceftriaxone in ED  -Continue IV ceftriaxone, deescalate according to culture and sensitivity  -If patient develops CTA tenderness or flank pain - might consider renal ultrasound        VTE Prophylaxis: Enoxaparin (Lovenox)  / sequential compression device   Code Status: Full Code  POLST: POLST completed      Anticipated Length of Stay:  Patient will be admitted on an Inpatient basis with an anticipated length of stay of  Over 2 midnights  Justification for Hospital Stay: failed outpatient antibiotic    Total Time for Visit, including Counseling / Coordination of Care: 45 minutes  Greater than 50% of this total time spent on direct patient counseling and coordination of care  Chief Complaint:   Fever, cough, weakness    History of Present Illness:    Nitin Cortez is a 60 y o  female who presents with generalized weakness for 2 days  Patient stated it was associated with fever, dry cough  It was also associated with sinus pressure, frontal headache, nasal drip  Patient started feeling sick on Wednesday and improved yesterday  However she feels generalized weakness today which brought her to ED  Patient says she got tired even after walking for 50 ft  Patient feels chills at home however she did not take temperature  Patient has history of anemia for which she has been taking iron sulfate  Patient had colonoscopy before which showed polyps and she had polypectomy  Patient stated she does not keep up with colonoscopy  Dr Richelle Aguilar is her gastroenterologist   Patient also has chronic diarrhea which has been on and off for 7 years  Diarrhea has been associated with certain kind of food  Patient says she usually gets diarrhea after getting antibiotic  Patient usually takes Imodium at home  Patient had diarrhea for 4 times on Wednesday, regular bowel movement and no diarrhea today  Patient has been eating and drinking fine  Patient usually eats small portion of meals  No dysphagia or odynophagia  Patient was COVID positive in April however it was negative this time  Patient also complaining of frontal headache which was relieved after IV ketorolac in ED  Patient said she has thick mucus-like urine which was treated with outpatient antibiotic by PCP almost a month ago  She denies any dysuria, urinary frequency, urgency, foul-smelling urine at this moment    Patient has questionable back pain with deep inspiration occasionally  Patient has history of DJD for which she was taking naproxen at home  Patient also has history of gastric bypass surgery  Review of Systems:    Review of Systems patient denies blurred vision, nausea, vomiting, chest pain, palpitation, shortness of breath change in urinary habits  Past Medical and Surgical History:     History reviewed  No pertinent past medical history  Past Surgical History:   Procedure Laterality Date    CHOLECYSTECTOMY      GASTRIC BYPASS      MAMMO (HISTORICAL)  05/2012    MEDIASTINOSCOPY         Meds/Allergies:    Prior to Admission medications    Medication Sig Start Date End Date Taking? Authorizing Provider   cholecalciferol (VITAMIN D3) 1,000 units tablet Take 1,000 Units by mouth daily    Yes Historical Provider, MD   cyclobenzaprine (FLEXERIL) 5 mg tablet Take 1 tablet (5 mg total) by mouth 3 (three) times a day as needed for muscle spasms 6/15/20  Yes Mao Recio MD   Ferrous Sulfate (SLOW FE PO) Take by mouth   Yes Historical Provider, MD   naproxen (NAPROSYN) 500 mg tablet Take 1 tablet (500 mg total) by mouth as needed for mild pain 6/15/20 8/14/20  Mao Recio MD     I have reviewed home medications with patient personally  Allergies: No Known Allergies    Social History:     Marital Status: Single   Occupation: Retired   Patient Pre-hospital Living Situation:  Independently  Patient Pre-hospital Level of Mobility:  Ambulation somewhat limited to generalized weakness  Patient Pre-hospital Diet Restrictions: None  Substance Use History:   Social History     Substance and Sexual Activity   Alcohol Use Not Currently     Social History     Tobacco Use   Smoking Status Never Smoker   Smokeless Tobacco Never Used     Social History     Substance and Sexual Activity   Drug Use Never    Comment: Hx: Over 30 years ago          Family History:    non-contributory    Physical Exam:     Vitals:   Blood Pressure: 143/59 (08/14/20 2300)  Pulse: (!) 110 (08/14/20 2300)  Temperature: (!) 103 °F (39 4 °C) (08/14/20 2300)  Temp Source: Tympanic (08/14/20 2300)  Respirations: 20 (08/14/20 2149)  Height: 5' (152 4 cm) (08/14/20 2003)  Weight - Scale: 84 7 kg (186 lb 11 7 oz) (08/14/20 2300)  SpO2: 100 % (08/14/20 2149)    Physical Exam    General: Elderly female patient lying in bed, breathing well on room air, no acute distress  HEENT: NC/AT, PERRL, EOM - normal  Neck: Supple  Pulm/Chest: Normal chest wall expansion, clear breath sounds on both side, no wheezing/rhonchi or crackles appreciated  CVS: RRR, normal S1&S2, no murmur appreciated, capillary refill <2s  Abd: soft, non tender, non distended, bowel sounds +, no CVA tenderness  MSK: move all 4 extremities spontaneously  Skin: warm  CNS: AAOx3, no acute focal neuro deficit, Motor 5/5 strength, no sensory deficit      Additional Data:     Lab Results: I have personally reviewed pertinent reports  Results from last 7 days   Lab Units 08/14/20 2037   WBC Thousand/uL 6 25   HEMOGLOBIN g/dL 12 4   HEMATOCRIT % 38 9   PLATELETS Thousands/uL 270   NEUTROS PCT % 64   LYMPHS PCT % 24   MONOS PCT % 12   EOS PCT % 0     Results from last 7 days   Lab Units 08/14/20 2052   SODIUM mmol/L 137   POTASSIUM mmol/L 3 6   CHLORIDE mmol/L 104   CO2 mmol/L 22   BUN mg/dL 15   CREATININE mg/dL 0 89   ANION GAP mmol/L 11   CALCIUM mg/dL 9 3   ALBUMIN g/dL 3 7   TOTAL BILIRUBIN mg/dL 0 50   ALK PHOS U/L 95 4   ALT U/L 17   AST U/L 28   GLUCOSE RANDOM mg/dL 138                 Results from last 7 days   Lab Units 08/14/20 2037   LACTIC ACID mmol/L 2 0       Imaging: I have personally reviewed pertinent reports  XR chest 1 view   Final Result by Alex Campbell MD (08/14 2157)      No acute cardiopulmonary disease              Workstation performed: [id number]             EKG, Pathology, and Other Studies Reviewed on Admission:   · EKG: NSR    Allscripts / Epic Records Reviewed: Yes     ** Please Note: This note has been constructed using a voice recognition system   **

## 2020-08-15 NOTE — ASSESSMENT & PLAN NOTE
SIRS possibly attributed by acute viral syndrome  -present with fever, dry cough, headache, sinus pressure, nasal drip  -COVID was negative, influenza negative   -CXR - no evidence of acute cardiopulmonary process      Patient reports an improvement in symptoms as compared to time of presentation  Continuing supportive management  - Acetaminophen-650 mg Q 6 p r n   -Naproxen 250 mg q 8 p r n

## 2020-08-15 NOTE — ASSESSMENT & PLAN NOTE
SIRS most likely due to UTI  -no evidence of organ dysfuction  -history of thick mucus urine - almost a month ago, treated with outpatient antibiotic (can't recall antibiotic name)  -patient denies dysuria, frequency, urgency, foul-smelling urine  -UA positive for nitrite, trace microscopic hematuria, glycosuria, 1+ proteinurisa  -Influenza negative, COVID negative  -No leukocytosis, normal lactic acid  -Electrolytes WNL, no renal function  -CRP 13 7, troponin negative, Danyell BNP  -no CVA tenderness    Plan  -received total 2 L NS bolus in ED  -Continue IV  cc/hour  -Follow urine culture  -Status post 1 dose of IV ceftriaxone in ED  -Continue IV ceftriaxone, deescalate according to culture and sensitivity  -If patient develops CTA tenderness or flank pain - might consider renal ultrasound

## 2020-08-15 NOTE — UTILIZATION REVIEW
Initial Clinical Review    Admission: Date/Time/Statement:   Admission Orders (From admission, onward)     Ordered        08/14/20 2236  Inpatient Admission  Once                   Orders Placed This Encounter   Procedures    Inpatient Admission     Standing Status:   Standing     Number of Occurrences:   1     Order Specific Question:   Admitting Physician     Answer:   Jefferson Root [[id number]]     Order Specific Question:   Level of Care     Answer:   Med Surg [16]     Order Specific Question:   Estimated length of stay     Answer:   More than 2 Midnights     Order Specific Question:   Certification     Answer:   I certify that inpatient services are medically necessary for this patient for a duration of greater than two midnights  See H&P and MD Progress Notes for additional information about the patient's course of treatment  ED Arrival Information     Expected Arrival Acuity Means of Arrival Escorted By Service Admission Type    - 8/14/2020 19:56 Urgent Walk-In Self General Medicine Urgent    Arrival Complaint    fever, chills        Chief Complaint   Patient presents with    Fever - 9 weeks to 74 years     Fever, cough, chills, diarrhea, muscle aches x 2 days  Tested +covid 4/25     Assessment/Plan    60year old female presents to ed for evaluation and treatment of  fever, chills, cough, dyspnea on exertion, loss of taste, abdominal pain, retching and diarrhea for 2 days  On arrival she reports frontal headache 9/10  She also reports her urine has been thick   NO PMHX  Clinical assessment significant for abdominal / suprapubic tenderness , UR moderate bacteria, wbc 4-10,  Positive nitrite  CRP 13 7  Fever and tachycardia  Blood culture drawn  Treated in ed  With iv  9% ns 2L bolus, tessalon perles  Admit to inpatient medical surgical for sirs  Plan includes follow up urine and blood cultures, continue iv fluids, start iv ceftriaxone        ED Triage Vitals   08/14/20 2003 08/14/20 2003 08/14/20 2003 08/14/20 2003 08/14/20 2003   98 7 °F (37 1 °C) 104 20 (!) 128/47 100 %      Oral Monitor           Pain Score       6          08/14/20 84 7 kg (186 lb 11 7 oz)     Additional Vital Signs:    Date/Time   Temp   Pulse   Resp   BP   SpO2   O2 Device    08/15/20 0848   99 3 °F (37 4 °C)   82   20   116/66   98 %   None (Room air)    08/15/20 0300   98 3 °F (36 8 °C)                   08/14/20 2300   103 °F (39 4 °C)  Abnormal     110  Abnormal        143/59          08/14/20 2149   99 7 °F (37 6 °C)   96   20   135/57   100 %   None (Room air)    08/14/20 2025   101 7 °F (38 7 °C)  ]Abnormal                                 Pertinent Labs/Diagnostic Test Results:     XR chest 1 view   Final  (08/14 2157)      No acute cardiopulmonary disease                Results from last 7 days   Lab Units 08/14/20 2035   SARS-COV-2  Negative     Results from last 7 days   Lab Units 08/15/20  0802 08/14/20 2037   WBC Thousand/uL 6 62 6 25   HEMOGLOBIN g/dL 10 3* 12 4   HEMATOCRIT % 32 2* 38 9   PLATELETS Thousands/uL 216 270   NEUTROS ABS Thousands/µL 4 14 3 98         Results from last 7 days   Lab Units 08/15/20  0802 08/14/20  2052   SODIUM mmol/L 139 137   POTASSIUM mmol/L 3 9 3 6   CHLORIDE mmol/L 109* 104   CO2 mmol/L 24 22   ANION GAP mmol/L 6 11   BUN mg/dL 14 15   CREATININE mg/dL 0 68 0 89   EGFR ml/min/1 73sq m 93 69   CALCIUM mg/dL 8 3* 9 3   MAGNESIUM mg/dL  --  2 0     Results from last 7 days   Lab Units 08/14/20 2052   AST U/L 28   ALT U/L 17   ALK PHOS U/L 95 4   TOTAL PROTEIN g/dL 7 2   ALBUMIN g/dL 3 7   TOTAL BILIRUBIN mg/dL 0 50         Results from last 7 days   Lab Units 08/15/20  0802 08/14/20  2052   GLUCOSE RANDOM mg/dL 103 138       Results from last 7 days   Lab Units 08/14/20 2038   TROPONIN I ng/mL <0 03       Results from last 7 days   Lab Units 08/14/20  2037   LACTIC ACID mmol/L 2 0     Results from last 7 days   Lab Units 08/14/20  2052   BNP pg/mL 22 2     Results from last 7 days   Lab Units 08/14/20 2038   FERRITIN ng/mL 96             Results from last 7 days   Lab Units 08/14/20  2052   CRP mg/L 13 7*         Results from last 7 days   Lab Units 08/14/20  2221   CLARITY UA  Clear   COLOR UA  Yellow   SPEC GRAV UA  1 025   PH UA  6 0   GLUCOSE UA mg/dl Trace*   KETONES UA mg/dl Negative   BLOOD UA  Trace-Intact*   PROTEIN UA mg/dl 1+*   NITRITE UA  Positive*   BILIRUBIN UA  Negative   UROBILINOGEN UA E U /dl 2 0   LEUKOCYTES UA  Negative   WBC UA /hpf 4-10*   RBC UA /hpf 1-2*   BACTERIA UA /hpf Moderate*   EPITHELIAL CELLS WET PREP /hpf Occasional     Results from last 7 days   Lab Units 08/14/20  2035   INFLUENZA A PCR  None Detected   INFLUENZA B PCR  None Detected   RSV PCR  None Detected           Results from last 7 days   Lab Units 08/14/20 2045   BLOOD CULTURE  Received in Microbiology Lab  Culture in Progress  Received in Microbiology Lab  Culture in Progress  ED Treatment:   Medication Administration from 08/14/2020 1955 to 08/14/2020 2317       Date/Time Order Dose Route Action     08/14/2020 2115 sodium chloride 0 9 % bolus 2,000 mL 2,000 mL Intravenous New Bag     08/14/2020 2152 benzonatate (TESSALON PERLES) capsule 100 mg 100 mg Oral Given        History reviewed  No pertinent past medical history       Present on Admission:   Varicose veins of bilateral lower extremities with pain   Iron deficiency anemia   Acute viral syndrome      Admitting Diagnosis:     Cough [R05]  Gastroenteritis [K52 9]  UTI (urinary tract infection) [N39 0]  Fever [R50 9]  SIRS (systemic inflammatory response syndrome) (HCC) [R65 10]     Age/Sex: 60 y o  female     Admission Orders:        Scheduled Medications:  cefTRIAXone, 1,000 mg, Intravenous, Q24H  cholecalciferol, 1,000 Units, Oral, Daily  enoxaparin, 40 mg, Subcutaneous, BID  ferrous sulfate, 325 mg, Oral, Daily With Breakfast      Continuous IV Infusions:  lactated ringers, 100 mL/hr, Intravenous, Continuous      PRN Meds:  acetaminophen, 650 mg, Oral, Q6H PRN  cyclobenzaprine, 5 mg, Oral, TID PRN  loperamide, 2 mg, Oral, TID PRN  naproxen, 250 mg, Oral, Q8H PRN  ondansetron, 4 mg, Intravenous, Q8H PRN        None    Network Utilization Review Department  [email or web address]  ATTENTION: Please call with any questions or concerns to [phone] and carefully listen to the prompts so that you are directed to the right person  All voicemails are confidential   Pamela Wynn all requests for admission clinical reviews, approved or denied determinations and any other requests to dedicated fax number below belonging to the campus where the patient is receiving treatment   List of dedicated fax numbers for the Facilities:  [address] DENIALS (Administrative/Medical Necessity) [phone]   [address] (Maternity/NICU/Pediatrics) [phone]   Jayshree Ang [phone]   [facility] [phone]   [address] [place] [address] [phone]   [address] [place] [address]  [phone]   [address] [place] [address] [phone]   [facility]  [phone]   [address] [facility] [address]  [address] [facility] [address] [facility] [phone]

## 2020-08-15 NOTE — PROGRESS NOTES
Progress Note - Edin Clayton 1955, 60 y o  female MRN: [id number]    Unit/Bed#: -01 Encounter: [id number]    Primary Care Provider: Teresita Joseph MD   Date and time admitted to hospital: 8/14/2020  8:02 PM        * SIRS (systemic inflammatory response syndrome) ([facility] [address] )  Assessment & Plan  SIRS most likely due to UTI vs URTI (sinusitis)  -no evidence of organ dysfuction  -history of thick mucus like urine - approximately 3 weeks ago, treated with an outpatient antibiotic by her PCP  -patient denies any present dysuria, frequency, urgency, foul-smelling urine  -UA positive for nitrite, WBC 4-10, trace microscopic hematuria, moderate amounts of bacteria, 1+ proteinurisa  -Influenza negative, COVID negative at present but positive in April  -No leukocytosis, normal lactic acid  -Electrolytes WNL, no renal function  -CRP 13 7, troponin negative, Normal BNP  -no CVA tenderness    Plan  -receiving IV  cc/hour  -urine culture pending  -Continuing IV ceftriaxone 1000 mg IV OD, will tailor according to pending culture and sensitivity  First dose of ceftriaxone in the ED at 8/14/20 p m   -If patient develops CTA tenderness or flank pain - will consider pursuing renal ultrasound    Acute viral syndrome  Assessment & Plan  SIRS possibly attributed by acute viral syndrome  -present with fever, dry cough, headache, sinus pressure, nasal drip  -COVID was negative, influenza negative   -CXR - no evidence of acute cardiopulmonary process      Patient reports an improvement in symptoms as compared to time of presentation  Continuing supportive management  - Acetaminophen-650 mg Q 6 p r n   -Naproxen 250 mg q 8 p r n      Varicose veins of bilateral lower extremities with pain  Assessment & Plan  -occasional leg cramps to do varicose vein  -Continuing Flexeril as needed    Iron deficiency anemia  Assessment & Plan  Patient's hemoglobin was 10 3, trended down from yesterday (12 4)     Continue iron sulfate        VTE Pharmacologic Prophylaxis:   Pharmacologic: Enoxaparin (Lovenox)  Mechanical VTE Prophylaxis in Place: Yes    Discussions with Specialists or Other Care Team Provider:  None    Education and Discussions with Family / Patient:  Discussed with patient    Current Length of Stay: 1 day(s)    Current Patient Status: Inpatient     Discharge Plan / Estimated Discharge Date:  Pending    Code Status: Level 1 - Full Code      Subjective:   Patient reports that she is doing better as compared to yesterday  She reports that overnight she had headache, chills and sweating  She still complains of some nasal congestion and rhinorrhea, associated with a dry cough  Denies any shortness of breath and chest pain  She denies any dysuria, urgency, or difficulty urinating  She does state that her urine still looks dark and thick   Objective:     Vitals:   Temp (24hrs), Av 1 °F (37 8 °C), Min:98 3 °F (36 8 °C), Max:103 °F (39 4 °C)    Temp:  [98 3 °F (36 8 °C)-103 °F (39 4 °C)] 99 3 °F (37 4 °C)  HR:  [] 82  Resp:  [20] 20  BP: (116-143)/(47-66) 116/66  SpO2:  [98 %-100 %] 98 %  Body mass index is 36 47 kg/m²  Input and Output Summary (last 24 hours): Intake/Output Summary (Last 24 hours) at 8/15/2020 1314  Last data filed at 8/15/2020 1001  Gross per 24 hour   Intake    Output 500 ml   Net -500 ml       Physical Exam:     Physical Exam  Constitutional:       General: She is not in acute distress  Appearance: Normal appearance  She is obese  HENT:      Head: Normocephalic and atraumatic  Nose: Congestion present  Mouth/Throat:      Mouth: Mucous membranes are moist    Eyes:      General: No scleral icterus  Right eye: No discharge  Left eye: No discharge  Extraocular Movements: Extraocular movements intact  Neck:      Musculoskeletal: Normal range of motion  No neck rigidity or muscular tenderness     Cardiovascular:      Rate and Rhythm: Normal rate and regular rhythm  Pulses: Normal pulses  Heart sounds: Normal heart sounds  Pulmonary:      Effort: Pulmonary effort is normal  No respiratory distress  Breath sounds: Normal breath sounds  No wheezing  Abdominal:      General: Bowel sounds are normal  There is no distension  Palpations: Abdomen is soft  Tenderness: There is no abdominal tenderness  There is no right CVA tenderness, left CVA tenderness or guarding  Musculoskeletal: Normal range of motion  Right lower leg: Edema (Varicose veins present, chronic) present  Left lower leg: Edema ( varicose veins present, chronic) present  Skin:     General: Skin is warm and dry  Neurological:      General: No focal deficit present  Mental Status: She is alert and oriented to person, place, and time  Psychiatric:         Mood and Affect: Mood normal          Thought Content: Thought content normal            Additional Data:     Labs:    Results from last 7 days   Lab Units 08/15/20  0802   WBC Thousand/uL 6 62   HEMOGLOBIN g/dL 10 3*   HEMATOCRIT % 32 2*   PLATELETS Thousands/uL 216   NEUTROS PCT % 62   LYMPHS PCT % 28   MONOS PCT % 9   EOS PCT % 1     Results from last 7 days   Lab Units 08/15/20  0802 08/14/20  2052   POTASSIUM mmol/L 3 9 3 6   CHLORIDE mmol/L 109* 104   CO2 mmol/L 24 22   BUN mg/dL 14 15   CREATININE mg/dL 0 68 0 89   CALCIUM mg/dL 8 3* 9 3   ALK PHOS U/L  --  95 4   ALT U/L  --  17   AST U/L  --  28           * I Have Reviewed All Lab Data Listed Above  * Additional Pertinent Lab Tests Reviewed: [address] Admission Reviewed    Imaging:    Imaging Reports Reviewed Today Include:  X-ray chest   maging Personally Reviewed by Myself Includes:  X-ray chest  Recent Cultures (last 7 days):     Results from last 7 days   Lab Units 08/14/20 2045   BLOOD CULTURE  Received in Microbiology Lab  Culture in Progress  Received in Microbiology Lab  Culture in Progress         Last 24 Hours Medication List:   Current Facility-Administered Medications   Medication Dose Route Frequency Provider Last Rate    acetaminophen  650 mg Oral Q6H PRN Leslie Doan MD      cefTRIAXone  1,000 mg Intravenous Q24H Leslie Doan MD 1,000 mg (08/15/20 0854)    cholecalciferol  1,000 Units Oral Daily Leslie Doan MD      cyclobenzaprine  5 mg Oral TID PRN Leslie Doan MD      enoxaparin  40 mg Subcutaneous BID Leslie Doan MD      ferrous sulfate  325 mg Oral Daily With Breakfast Leslie Doan MD      lactated ringers  100 mL/hr Intravenous Continuous Leslie Doan  mL/hr (08/15/20 0012)    loperamide  2 mg Oral TID PRN Leslie Doan MD      naproxen  250 mg Oral Q8H PRN Leslie Doan MD      ondansetron  4 mg Intravenous Q8H PRN Leslie Doan MD          Today, Patient Was Seen By: Radha Hanna MD    ** Please Note: This note has been constructed using a voice recognition system   **

## 2020-08-15 NOTE — ASSESSMENT & PLAN NOTE
SIRS most likely due to UTI vs URTI (sinusitis)  -no evidence of organ dysfuction  -history of thick mucus like urine - approximately 3 weeks ago, treated with an outpatient antibiotic by her PCP  -patient denies any present dysuria, frequency, urgency, foul-smelling urine  -UA positive for nitrite, WBC 4-10, trace microscopic hematuria, moderate amounts of bacteria, 1+ proteinurisa  -Influenza negative, COVID negative at present but positive in April  -No leukocytosis, normal lactic acid  -Electrolytes WNL, no renal function  -CRP 13 7, troponin negative, Normal BNP  -no CVA tenderness    Plan  -receiving IV  cc/hour  -urine culture pending  -Continuing IV ceftriaxone 1000 mg IV OD, will tailor according to pending culture and sensitivity    First dose of ceftriaxone in the ED at 8/14/20 p m   -If patient develops CTA tenderness or flank pain - will consider pursuing renal ultrasound

## 2020-08-16 PROBLEM — N39.0 UTI (URINARY TRACT INFECTION): Status: ACTIVE | Noted: 2020-08-16

## 2020-08-16 PROBLEM — R51.9 HEADACHE: Status: ACTIVE | Noted: 2020-08-16

## 2020-08-16 LAB
ANION GAP SERPL CALCULATED.3IONS-SCNC: 7 MMOL/L (ref 4–13)
BASOPHILS # BLD AUTO: 0.01 THOUSANDS/ΜL (ref 0–0.1)
BASOPHILS NFR BLD AUTO: 0 % (ref 0–1)
BUN SERPL-MCNC: 11 MG/DL (ref 6–20)
CALCIUM SERPL-MCNC: 8.6 MG/DL (ref 8.4–10.2)
CHLORIDE SERPL-SCNC: 106 MMOL/L (ref 96–108)
CO2 SERPL-SCNC: 27 MMOL/L (ref 22–33)
CREAT SERPL-MCNC: 0.68 MG/DL (ref 0.4–1.1)
EOSINOPHIL # BLD AUTO: 0.06 THOUSAND/ΜL (ref 0–0.61)
EOSINOPHIL NFR BLD AUTO: 1 % (ref 0–6)
ERYTHROCYTE [DISTWIDTH] IN BLOOD BY AUTOMATED COUNT: 15.8 % (ref 11.6–15.1)
GFR SERPL CREATININE-BSD FRML MDRD: 93 ML/MIN/1.73SQ M
GLUCOSE SERPL-MCNC: 99 MG/DL (ref 65–140)
HCT VFR BLD AUTO: 32.2 % (ref 34.8–46.1)
HGB BLD-MCNC: 10.4 G/DL (ref 11.5–15.4)
IMM GRANULOCYTES # BLD AUTO: 0.01 THOUSAND/UL (ref 0–0.2)
IMM GRANULOCYTES NFR BLD AUTO: 0 % (ref 0–2)
LYMPHOCYTES # BLD AUTO: 1.69 THOUSANDS/ΜL (ref 0.6–4.47)
LYMPHOCYTES NFR BLD AUTO: 33 % (ref 14–44)
MCH RBC QN AUTO: 31.2 PG (ref 26.8–34.3)
MCHC RBC AUTO-ENTMCNC: 32.3 G/DL (ref 31.4–37.4)
MCV RBC AUTO: 97 FL (ref 82–98)
MONOCYTES # BLD AUTO: 0.51 THOUSAND/ΜL (ref 0.17–1.22)
MONOCYTES NFR BLD AUTO: 10 % (ref 4–12)
NEUTROPHILS # BLD AUTO: 2.86 THOUSANDS/ΜL (ref 1.85–7.62)
NEUTS SEG NFR BLD AUTO: 56 % (ref 43–75)
PLATELET # BLD AUTO: 228 THOUSANDS/UL (ref 149–390)
PMV BLD AUTO: 9.9 FL (ref 8.9–12.7)
POTASSIUM SERPL-SCNC: 3.8 MMOL/L (ref 3.5–5)
PROCALCITONIN SERPL-MCNC: 0.76 NG/ML
RBC # BLD AUTO: 3.33 MILLION/UL (ref 3.81–5.12)
SODIUM SERPL-SCNC: 140 MMOL/L (ref 133–145)
WBC # BLD AUTO: 5.14 THOUSAND/UL (ref 4.31–10.16)

## 2020-08-16 PROCEDURE — 80048 BASIC METABOLIC PNL TOTAL CA: CPT | Performed by: INTERNAL MEDICINE

## 2020-08-16 PROCEDURE — 99232 SBSQ HOSP IP/OBS MODERATE 35: CPT | Performed by: INTERNAL MEDICINE

## 2020-08-16 PROCEDURE — 85025 COMPLETE CBC W/AUTO DIFF WBC: CPT | Performed by: INTERNAL MEDICINE

## 2020-08-16 PROCEDURE — 84145 PROCALCITONIN (PCT): CPT | Performed by: INTERNAL MEDICINE

## 2020-08-16 RX ADMIN — ENOXAPARIN SODIUM 40 MG: 40 INJECTION SUBCUTANEOUS at 17:15

## 2020-08-16 RX ADMIN — CEFTRIAXONE 1000 MG: 1 INJECTION, SOLUTION INTRAVENOUS at 09:11

## 2020-08-16 RX ADMIN — GUAIFENESIN AND DEXTROMETHORPHAN 10 ML: 100; 10 SYRUP ORAL at 05:46

## 2020-08-16 RX ADMIN — ENOXAPARIN SODIUM 40 MG: 40 INJECTION SUBCUTANEOUS at 09:11

## 2020-08-16 RX ADMIN — FERROUS SULFATE TAB 325 MG (65 MG ELEMENTAL FE) 325 MG: 325 (65 FE) TAB at 09:12

## 2020-08-16 RX ADMIN — ACETAMINOPHEN 650 MG: 325 TABLET, FILM COATED ORAL at 14:38

## 2020-08-16 RX ADMIN — ACETAMINOPHEN 650 MG: 325 TABLET, FILM COATED ORAL at 05:46

## 2020-08-16 RX ADMIN — VITAMIN D, TAB 1000IU (100/BT) 1000 UNITS: 25 TAB at 09:11

## 2020-08-16 NOTE — ASSESSMENT & PLAN NOTE
Urinalysis-evidence of UTI with moderate bacteriuria  Urine culture-pending  Continue ceftriaxone  Fever spikes are coming down

## 2020-08-16 NOTE — PLAN OF CARE
Problem: Potential for Falls  Goal: Patient will remain free of falls  Description: INTERVENTIONS:  - Assess patient frequently for physical needs  -  Identify cognitive and physical deficits and behaviors that affect risk of falls    -  [place] fall precautions as indicated by assessment   - Educate patient/family on patient safety including physical limitations  - Instruct patient to call for assistance with activity based on assessment    Outcome: Progressing   Call bell, frequent rounding, walker,  Problem: INFECTION - ADULT  Goal: Absence or prevention of progression during hospitalization  Description: INTERVENTIONS:  - Assess and monitor for signs and symptoms of infection  - Monitor lab/diagnostic results  - Administer medications as ordered  - Instruct and encourage patient and family to use good hand hygiene technique  - Identify and instruct in appropriate isolation precautions for identified infection/condition  Outcome: Progressing   IV abx, assess and monitor for worsening infection, monitor labs

## 2020-08-16 NOTE — PROGRESS NOTES
Progress Note - Fredy Vallejo 1955, 60 y o  female MRN: [id number]    Unit/Bed#: -01 Encounter: [id number]    Primary Care Provider: Bushra Gage MD   Date and time admitted to hospital: 8/14/2020  8:02 PM        * SIRS (systemic inflammatory response syndrome) ([facility] [address] )  Assessment & Plan  Sirs criteria on admission likely secondary to urinary tract infection  Continue antibiotics-ceftriaxone  Fever spikes are getting better, T-max since last 24 hours-100 1  No evidence of leukocytosis  Urine culture-pending  Influenza negative, RSV-negative, COVID-19 negative    UTI (urinary tract infection)  Assessment & Plan  Urinalysis-evidence of UTI with moderate bacteriuria  Urine culture-pending  Continue ceftriaxone  Fever spikes are coming down  Headache  Assessment & Plan  Patient reports of headache, frontal headaches  Suspicious for sinusitis  Although patient does not have productive cough  Continue pain control with Motrin    Iron deficiency anemia  Assessment & Plan  Continue iron supplementation          ----------------------------------------------------------------------------------------  HPI/24hr events:  No overnight events reported, patient febrile with T-max of 100 1°  Disposition:  Continue inpatient care  Code Status: Level 1 - Full Code  ---------------------------------------------------------------------------------------  SUBJECTIVE  Patient seen at bedside today morning  Patient appears in no acute apparent distress  Patient reports she has been having headaches in the frontal region  Cough with nonproductive sputum  Patient reports of chills    No shortness of breath    Review of Systems  Review of systems was reviewed and negative unless stated above in HPI/24-hour events   ---------------------------------------------------------------------------------------  OBJECTIVE    Vitals   Vitals:    08/15/20 2139 08/16/20 0400 08/16/20 0555 08/16/20 0708   BP:  124/66  108/69   BP Location:  Left arm  Left arm   Pulse:  71  74   Resp:  18  18   Temp: 99 °F (37 2 °C) 97 9 °F (36 6 °C) 100 1 °F (37 8 °C) 98 6 °F (37 °C)   TempSrc:  Tympanic Tympanic Tympanic   SpO2:  98%  96%   Weight:       Height:         Temp (24hrs), Av 8 °F (37 1 °C), Min:97 7 °F (36 5 °C), Max:100 1 °F (37 8 °C)  Current: Temperature: 98 6 °F (37 °C)          Respiratory:  SpO2: SpO2: 96 %       Invasive/non-invasive ventilation settings   Respiratory    Lab Data (Last 4 hours)    None         O2/Vent Data (Last 4 hours)    None                Physical Exam  Constitutional:       Appearance: Normal appearance  HENT:      Head: Normocephalic and atraumatic  Mouth/Throat:      Mouth: Mucous membranes are moist    Eyes:      Pupils: Pupils are equal, round, and reactive to light  Neck:      Musculoskeletal: Normal range of motion  Cardiovascular:      Rate and Rhythm: Normal rate and regular rhythm  Pulmonary:      Effort: Pulmonary effort is normal       Breath sounds: Normal breath sounds  Abdominal:      General: Abdomen is flat  Bowel sounds are normal       Palpations: Abdomen is soft  Skin:     General: Skin is warm  Capillary Refill: Capillary refill takes less than 2 seconds  Neurological:      General: No focal deficit present  Mental Status: She is alert and oriented to person, place, and time  Mental status is at baseline           Laboratory and Diagnostics:  Results from last 7 days   Lab Units 20  0553 08/15/20  0802 20  2037   WBC Thousand/uL 5 14 6 62 6 25   HEMOGLOBIN g/dL 10 4* 10 3* 12 4   HEMATOCRIT % 32 2* 32 2* 38 9   PLATELETS Thousands/uL 228 216 270   NEUTROS PCT % 56 62 64   MONOS PCT % 10 9 12     Results from last 7 days   Lab Units 20  0554 08/15/20  0802 20  2052   SODIUM mmol/L 140 139 137   POTASSIUM mmol/L 3 8 3 9 3 6   CHLORIDE mmol/L 106 109* 104   CO2 mmol/L 27 24 22   ANION GAP mmol/L 7 6 11   BUN mg/dL 11 14 15   CREATININE mg/dL 0  68 0 68 0 89   CALCIUM mg/dL 8 6 8 3* 9 3   GLUCOSE RANDOM mg/dL 99 103 138   ALT U/L  --   --  17   AST U/L  --   --  28   ALK PHOS U/L  --   --  95 4   ALBUMIN g/dL  --   --  3 7   TOTAL BILIRUBIN mg/dL  --   --  0 50     Results from last 7 days   Lab Units 08/14/20 2052   MAGNESIUM mg/dL 2 0           Results from last 7 days   Lab Units 08/14/20 2038   TROPONIN I ng/mL <0 03     Results from last 7 days   Lab Units 08/14/20 2037   LACTIC ACID mmol/L 2 0     ABG:    VBG:    Results from last 7 days   Lab Units 08/15/20  0802   PROCALCITONIN ng/ml 1 18*       Micro  Results from last 7 days   Lab Units 08/14/20 2045   BLOOD CULTURE  No Growth at 24 hrs  No Growth at 24 hrs  Imaging: I have personally reviewed pertinent reports  Intake and Output  I/O       08/14 0701 - 08/15 0700 08/15 0701 - 08/16 0700 08/16 0701 - 08/17 0700    P  O   840     Total Intake(mL/kg)  840 (9 9)     Urine (mL/kg/hr)  1650 (0 8)     Total Output  1650     Net  -810                  Height and Weights   Height: 5' (152 4 cm)  IBW: 45 5 kg  Body mass index is 36 47 kg/m²  Weight (last 2 days)     Date/Time   Weight    08/14/20 2300   84 7 (186 73)    08/14/20 2003   81 6 (180)                Nutrition       Diet Orders   (From admission, onward)             Start     Ordered    08/14/20 2327  Diet Regular; Regular House  Diet effective now     Question Answer Comment   Diet Type Regular    Regular Regular House    RD to adjust diet per protocol?  Yes        08/14/20 2329                  Active Medications  Scheduled Meds:  Current Facility-Administered Medications   Medication Dose Route Frequency Provider Last Rate    acetaminophen  650 mg Oral Q6H PRN Oscar Pedro MD      cefTRIAXone  1,000 mg Intravenous Q24H Oscar Pedro MD 1,000 mg (08/16/20 0911)    cholecalciferol  1,000 Units Oral Daily Oscar Pedro MD      cyclobenzaprine  5 mg Oral TID PRN Oscar Pedro MD      dextromethorphan-guaiFENesin  10 mL Oral Q6H PRN Vito Burleson MD      enoxaparin  40 mg Subcutaneous BID Rebecca Vanessa MD      ferrous sulfate  325 mg Oral Daily With Breakfast Rebecca Vanessa MD      loperamide  2 mg Oral TID PRN Rebecca Vanessa MD      naproxen  250 mg Oral Q8H PRN Rebecca Vanessa MD      ondansetron  4 mg Intravenous Q8H PRN Rebecca Vanessa MD       Continuous Infusions:     PRN Meds:   acetaminophen, 650 mg, Q6H PRN  cyclobenzaprine, 5 mg, TID PRN  dextromethorphan-guaiFENesin, 10 mL, Q6H PRN  loperamide, 2 mg, TID PRN  naproxen, 250 mg, Q8H PRN  ondansetron, 4 mg, Q8H PRN        Invasive Devices Review  Invasive Devices     Peripheral Intravenous Line            Peripheral IV 08/14/20 Right Antecubital 1 day                      Tico Viveros MD      Portions of the record may have been created with voice recognition software  Occasional wrong word or "sound a like" substitutions may have occurred due to the inherent limitations of voice recognition software    Read the chart carefully and recognize, using context, where substitutions have occurred

## 2020-08-16 NOTE — ASSESSMENT & PLAN NOTE
Patient reports of headache, frontal headaches  Suspicious for sinusitis    Although patient does not have productive cough  Continue pain control with Motrin

## 2020-08-16 NOTE — ASSESSMENT & PLAN NOTE
Sirs criteria on admission likely secondary to urinary tract infection  Continue antibiotics-ceftriaxone  Fever spikes are getting better, T-max since last 24 hours-100 1  No evidence of leukocytosis  Urine culture-pending  Influenza negative, RSV-negative, COVID-19 negative

## 2020-08-17 VITALS
OXYGEN SATURATION: 95 % | RESPIRATION RATE: 16 BRPM | BODY MASS INDEX: 36.66 KG/M2 | HEART RATE: 81 BPM | TEMPERATURE: 97.7 F | DIASTOLIC BLOOD PRESSURE: 75 MMHG | HEIGHT: 60 IN | SYSTOLIC BLOOD PRESSURE: 114 MMHG | WEIGHT: 186.73 LBS

## 2020-08-17 LAB
ANION GAP SERPL CALCULATED.3IONS-SCNC: 7 MMOL/L (ref 4–13)
ANISOCYTOSIS BLD QL SMEAR: PRESENT
BASOPHILS # BLD MANUAL: 0.06 THOUSAND/UL (ref 0–0.1)
BASOPHILS NFR MAR MANUAL: 1 % (ref 0–1)
BUN SERPL-MCNC: 10 MG/DL (ref 6–20)
CALCIUM SERPL-MCNC: 8.7 MG/DL (ref 8.4–10.2)
CHLORIDE SERPL-SCNC: 107 MMOL/L (ref 96–108)
CO2 SERPL-SCNC: 28 MMOL/L (ref 22–33)
CREAT SERPL-MCNC: 0.66 MG/DL (ref 0.4–1.1)
EOSINOPHIL # BLD MANUAL: 0 THOUSAND/UL (ref 0–0.4)
EOSINOPHIL NFR BLD MANUAL: 0 % (ref 0–6)
ERYTHROCYTE [DISTWIDTH] IN BLOOD BY AUTOMATED COUNT: 15.7 % (ref 11.6–15.1)
GFR SERPL CREATININE-BSD FRML MDRD: 94 ML/MIN/1.73SQ M
GLUCOSE SERPL-MCNC: 97 MG/DL (ref 65–140)
HCT VFR BLD AUTO: 32.1 % (ref 34.8–46.1)
HGB BLD-MCNC: 10.3 G/DL (ref 11.5–15.4)
LYMPHOCYTES # BLD AUTO: 2.38 THOUSAND/UL (ref 0.6–4.47)
LYMPHOCYTES # BLD AUTO: 43 % (ref 14–44)
MACROCYTES BLD QL AUTO: PRESENT
MCH RBC QN AUTO: 30.9 PG (ref 26.8–34.3)
MCHC RBC AUTO-ENTMCNC: 32.1 G/DL (ref 31.4–37.4)
MCV RBC AUTO: 96 FL (ref 82–98)
MONOCYTES # BLD AUTO: 0.72 THOUSAND/UL (ref 0–1.22)
MONOCYTES NFR BLD: 13 % (ref 4–12)
NEUTROPHILS # BLD MANUAL: 2.38 THOUSAND/UL (ref 1.85–7.62)
NEUTS BAND NFR BLD MANUAL: 3 % (ref 0–8)
NEUTS SEG NFR BLD AUTO: 40 % (ref 43–75)
PLATELET # BLD AUTO: 277 THOUSANDS/UL (ref 149–390)
PLATELET BLD QL SMEAR: ADEQUATE
PMV BLD AUTO: 10 FL (ref 8.9–12.7)
POTASSIUM SERPL-SCNC: 3.7 MMOL/L (ref 3.5–5)
RBC # BLD AUTO: 3.33 MILLION/UL (ref 3.81–5.12)
RBC MORPH BLD: PRESENT
SODIUM SERPL-SCNC: 142 MMOL/L (ref 133–145)
TOTAL CELLS COUNTED SPEC: 100
WBC # BLD AUTO: 5.54 THOUSAND/UL (ref 4.31–10.16)

## 2020-08-17 PROCEDURE — 85007 BL SMEAR W/DIFF WBC COUNT: CPT | Performed by: INTERNAL MEDICINE

## 2020-08-17 PROCEDURE — 85027 COMPLETE CBC AUTOMATED: CPT | Performed by: INTERNAL MEDICINE

## 2020-08-17 PROCEDURE — 99239 HOSP IP/OBS DSCHRG MGMT >30: CPT | Performed by: INTERNAL MEDICINE

## 2020-08-17 PROCEDURE — 80048 BASIC METABOLIC PNL TOTAL CA: CPT | Performed by: INTERNAL MEDICINE

## 2020-08-17 RX ORDER — CEFPODOXIME PROXETIL 200 MG/1
200 TABLET, FILM COATED ORAL 2 TIMES DAILY
Qty: 10 TABLET | Refills: 0 | Status: SHIPPED | OUTPATIENT
Start: 2020-08-17 | End: 2020-08-22

## 2020-08-17 RX ADMIN — FERROUS SULFATE TAB 325 MG (65 MG ELEMENTAL FE) 325 MG: 325 (65 FE) TAB at 08:10

## 2020-08-17 RX ADMIN — ENOXAPARIN SODIUM 40 MG: 40 INJECTION SUBCUTANEOUS at 08:10

## 2020-08-17 RX ADMIN — VITAMIN D, TAB 1000IU (100/BT) 1000 UNITS: 25 TAB at 08:10

## 2020-08-17 RX ADMIN — CEFTRIAXONE 1000 MG: 1 INJECTION, SOLUTION INTRAVENOUS at 08:10

## 2020-08-17 NOTE — PLAN OF CARE
Problem: Potential for Falls  Goal: Patient will remain free of falls  Description: INTERVENTIONS:  - Assess patient frequently for physical needs  -  Identify cognitive and physical deficits and behaviors that affect risk of falls    -  [place] fall precautions as indicated by assessment   - Educate patient/family on patient safety including physical limitations  - Instruct patient to call for assistance with activity based on assessment    8/17/2020 1119 by Andrei Moreno RN  Outcome: Completed  8/17/2020 0933 by Andrei Moreno RN  Outcome: Progressing     Problem: INFECTION - ADULT  Goal: Absence or prevention of progression during hospitalization  Description: INTERVENTIONS:  - Assess and monitor for signs and symptoms of infection  - Monitor lab/diagnostic results  - Administer medications as ordered  - Instruct and encourage patient and family to use good hand hygiene technique  - Identify and instruct in appropriate isolation precautions for identified infection/condition  8/17/2020 1119 by Andrei Moreno RN  Outcome: Completed  8/17/2020 0933 by Andrei Moreno RN  Outcome: Progressing

## 2020-08-17 NOTE — ASSESSMENT & PLAN NOTE
Sirs criteria on admission likely secondary to urinary tract infection  Patient to complete total 7 days course of antibiotics

## 2020-08-17 NOTE — DISCHARGE INSTR - AVS FIRST PAGE
1  Follow-up with primary care physician within 1 week in regards to recent hospitalization  2  New medications prescribed-cefpodoxime 200 mg twice daily for 5 days    3  Take your regular medications as prescribed    4  He can use Tylenol or Motrin for headaches as needed    If  you continue to have headaches please follow-up with your primary care doctor

## 2020-08-17 NOTE — ASSESSMENT & PLAN NOTE
Can take p r n   Tylenol or Motrin  If continues to have headaches, advised to follow-up with primary care doctor

## 2020-08-17 NOTE — PLAN OF CARE
Problem: INFECTION - ADULT  Goal: Absence or prevention of progression during hospitalization  Description: INTERVENTIONS:  - Assess and monitor for signs and symptoms of infection  - Monitor lab/diagnostic results  - Administer medications as ordered  - Instruct and encourage patient and family to use good hand hygiene technique  - Identify and instruct in appropriate isolation precautions for identified infection/condition  Outcome: Progressing   IV abx, asses for signs and symptoms of worsening infection

## 2020-08-17 NOTE — ASSESSMENT & PLAN NOTE
Urinalysis-evidence of UTI with moderate bacteriuria  Urine culture-pending  Continue cefpodoxime outpatient    Prescription sent to pharmacy

## 2020-08-17 NOTE — UTILIZATION REVIEW
Notification of Inpatient Admission/Inpatient Authorization Request   This is a Notification of Inpatient Admission for [address]  Be advised that this patient was admitted to our facility under Inpatient Status  Contact Debra Sanchez at [phone] for additional admission information  [address] [facility] DEPT  DEDICATED [phone]  Patient Name:   Chelsea Cunha   YOB: 1955       [address]   [address]:   [address] [facility]  Tax ID: [id number]  NPI: [id number] Attending Provider/NPI:  Phone:  Address: Jeff Marrero [[id number]]  [phone]  Same as the facility   Place of Service Code: 24 [facility]  [facility]   Start Date: 8/14/20 2236 Discharge Date & Time: 8/17/2020 11:51 AM    Type of Admission: Inpatient Status Discharge Disposition   (if discharged): Home/Self Care   Patient Diagnoses: Cough [R05]  Gastroenteritis [K52 9]  UTI (urinary tract infection) [N39 0]  Fever [R50 9]  S[facility]systemic inflammatory response syndrome) ([facility] ) [R65 10]   Orders: Admission Orders (From admission, onward)     Ordered        08/14/20 2236  Inpatient Admission  Once                    Assigned Utilization Review Contact: Debra Sanchez  Utilization   Network Utilization Review Department  Phone: [phone]; Fax [phone]  Email: [email or web address]   Initial Clinical Review     Admission: Date/Time/Statement:       Admission Orders (From admission, onward)              Ordered          08/14/20 2236   Inpatient Admission  Once                            Orders Placed This Encounter   Procedures    Inpatient Admission       Standing Status:   Standing       Number of Occurrences:   1       Order Specific Question:   Admitting Physician       Answer:   Florian Huynh [[id number]]       Order Specific Question:   Level of Care       Answer:   Med Surg [16]     Order Specific Question:   Estimated length of stay       Answer:   More than 2 Midnights       Order Specific Question:   Certification       Answer:   I certify that inpatient services are medically necessary for this patient for a duration of greater than two midnights  See H&P and MD Progress Notes for additional information about the patient's course of treatment  ED Arrival Information      Expected Arrival Acuity Means of Arrival Escorted By Service Admission Type     - 8/14/2020 19:56 Urgent Walk-In Self General Medicine Urgent     Arrival Complaint     fever, chills               Chief Complaint   Patient presents with    Fever - 9 weeks to 74 years       Fever, cough, chills, diarrhea, muscle aches x 2 days  Tested +covid 4/25     Assessment/Plan     60year old female presents to ed for evaluation and treatment of  fever, chills, cough, dyspnea on exertion, loss of taste, abdominal pain, retching and diarrhea for 2 days  On arrival she reports frontal headache 9/10  She also reports her urine has been thick   NO PMHX  Clinical assessment significant for abdominal / suprapubic tenderness , UR moderate bacteria, wbc 4-10,  Positive nitrite  CRP 13 7  Fever and tachycardia  Blood culture drawn  Treated in ed  With iv  9% ns 2L bolus, tessalon perles  Admit to inpatient medical surgical for sirs     Plan includes follow up urine and blood cultures, continue iv fluids, start iv ceftriaxone               ED Triage Vitals   08/14/20 2003 08/14/20 2003 08/14/20 2003 08/14/20 2003 08/14/20 2003   98 7 °F (37 1 °C) 104 20 (!) 128/47 100 %       Oral Monitor                Pain Score           6              08/14/20 84 7 kg (186 lb 11 7 oz)      Additional Vital Signs:     Date/Time    Temp    Pulse    Resp    BP    SpO2    O2 Device    08/15/20 0848    99 3 °F (37 4 °C)    82    20    116/66    98 %    None (Room air)    08/15/20 0300    98 3 °F (36 8 °C)                     08/14/20 2300    103 °F (39 4 °C)  Abnormal      110  Abnormal          143/59            08/14/20 2149    99 7 °F (37 6 °C)    96    20    135/57    100 %    None (Room air)    08/14/20 2025    101 7 °F (38 7 °C)  ]Abnormal                                            Pertinent Labs/Diagnostic Test Results:      XR chest 1 view   Final  (08/14 2157)       No acute cardiopulmonary disease                        Results from last 7 days   Lab Units 08/14/20 2035   SARS-COV-2   Negative           Results from last 7 days   Lab Units 08/15/20  0802 08/14/20 2037   WBC Thousand/uL 6 62 6 25   HEMOGLOBIN g/dL 10 3* 12 4   HEMATOCRIT % 32 2* 38 9   PLATELETS Thousands/uL 216 270   NEUTROS ABS Thousands/µL 4 14 3 98               Results from last 7 days   Lab Units 08/15/20  0802 08/14/20 2052   SODIUM mmol/L 139 137   POTASSIUM mmol/L 3 9 3 6   CHLORIDE mmol/L 109* 104   CO2 mmol/L 24 22   ANION GAP mmol/L 6 11   BUN mg/dL 14 15   CREATININE mg/dL 0 68 0 89   EGFR ml/min/1 73sq m 93 69   CALCIUM mg/dL 8 3* 9 3   MAGNESIUM mg/dL  --  2 0          Results from last 7 days   Lab Units 08/14/20 2052   AST U/L 28   ALT U/L 17   ALK PHOS U/L 95 4   TOTAL PROTEIN g/dL 7 2   ALBUMIN g/dL 3 7   TOTAL BILIRUBIN mg/dL 0 50               Results from last 7 days   Lab Units 08/15/20  0802 08/14/20 2052   GLUCOSE RANDOM mg/dL 103 138             Results from last 7 days   Lab Units 08/14/20 2038   TROPONIN I ng/mL <0 03             Results from last 7 days   Lab Units 08/14/20 2037   LACTIC ACID mmol/L 2 0     Results from last 7 days   Lab Units 08/14/20 2052   BNP pg/mL 22 2          Results from last 7 days   Lab Units 08/14/20 2038   FERRITIN ng/mL 96                  Results from last 7 days   Lab Units 08/14/20 2052   CRP mg/L 13 7*              Results from last 7 days   Lab Units 08/14/20 2221   CLARITY UA   Clear   COLOR UA   Yellow   SPEC GRAV UA   1 025   PH UA   6 0   GLUCOSE UA mg/dl Trace*   KETONES UA mg/dl Negative   BLOOD UA   Trace-Intact*   PROTEIN UA mg/dl 1+*   NITRITE UA   Positive*   BILIRUBIN UA   Negative   UROBILINOGEN UA E U /dl 2 0   LEUKOCYTES UA   Negative   WBC UA /hpf 4-10*   RBC UA /hpf 1-2*   BACTERIA UA /hpf Moderate*   EPITHELIAL CELLS WET PREP /hpf Occasional          Results from last 7 days   Lab Units 08/14/20 2035   INFLUENZA A PCR   None Detected   INFLUENZA B PCR   None Detected   RSV PCR   None Detected                 Results from last 7 days   Lab Units 08/14/20 2045   BLOOD CULTURE   Received in Microbiology Lab  Culture in Progress  Received in Microbiology Lab  Culture in Progress                 ED Treatment:             Medication Administration from 08/14/2020 1955 to 08/14/2020 2317        Date/Time Order Dose Route Action       08/14/2020 2115 sodium chloride 0 9 % bolus 2,000 mL 2,000 mL Intravenous New Bag       08/14/2020 2152 benzonatate (TESSALON PERLES) capsule 100 mg 100 mg Oral Given          Medical History   History reviewed   No pertinent past medical history          Present on Admission:   Varicose veins of bilateral lower extremities with pain   Iron deficiency anemia   Acute viral syndrome       Admitting Diagnosis:      Cough [R05]  Gastroenteritis [K52 9]  UTI (urinary tract infection) [N39 0]  Fever [R50 9]  SIRS (systemic inflammatory response syndrome) (HCC) [R65 10]      Age/Sex: 60 y o  female      Admission Orders:           Scheduled Medications:  cefTRIAXone, 1,000 mg, Intravenous, Q24H  cholecalciferol, 1,000 Units, Oral, Daily  enoxaparin, 40 mg, Subcutaneous, BID  ferrous sulfate, 325 mg, Oral, Daily With Breakfast       Continuous IV Infusions:  lactated ringers, 100 mL/hr, Intravenous, Continuous       PRN Meds:  acetaminophen, 650 mg, Oral, Q6H PRN  cyclobenzaprine, 5 mg, Oral, TID PRN  loperamide, 2 mg, Oral, TID PRN  naproxen, 250 mg, Oral, Q8H PRN  ondansetron, 4 mg, Intravenous, Q8H PRN          None     Network Utilization Review [email or web address]  ATTENTION: Please call with any questions or concerns to [phone] and carefully listen to the prompts so that you are directed to the right person  All voicemails are confidential   Yuniel Sharma all requests for admission clinical reviews, approved or denied determinations and any other requests to dedicated fax number below belonging to the campus where the patient is receiving treatment   List of dedicated fax numbers for the Facilities:  [address] DENIALS (Administrative/Medical Necessity) [phone]   [address] (Maternity/NICU/Pediatrics) [phone]   Clemencia Walter [phone]   Coco Thai [phone]   [place]  [phone]   [facility] [address] [facility] [phone]   [facility] [address] [facility] [address] [facility] [phone]

## 2020-08-17 NOTE — NURSING NOTE
AVS summary reviewed  Education completed on new medication  IV removed  Pt discharged with belongings

## 2020-08-17 NOTE — DISCHARGE SUMMARY
Discharge- Naima Guillen 1955, 60 y o  female MRN: [id number]    Unit/Bed#: -01 Encounter: [id number]    Primary Care Provider: Alice Huff MD   Date and time admitted to hospital: 8/14/2020  8:02 PM        * [facility]  Assessment & Plan  Sirs criteria on admission likely secondary to urinary tract infection  Patient to complete total 7 days course of antibiotics    UTI (urinary tract infection)  Assessment & Plan  Urinalysis-evidence of UTI with moderate bacteriuria  Urine culture-pending  Continue cefpodoxime outpatient  Prescription sent to pharmacy    Headache  Assessment & Plan  Can take p r n  Tylenol or Motrin  If continues to have headaches, advised to follow-up with primary care doctor    Iron deficiency anemia  Assessment & Plan  Continue iron supplementation        Discharging Physician / Practitioner: Wilfrido Jessica MD  PCP: Alice Huff MD  Admission Date:   Admission Orders (From admission, onward)     Ordered        08/14/20 2236  Inpatient Admission  Once                   Discharge Date: 08/17/20    Resolved Problems  Date Reviewed: 8/17/2020    None          Consultations During Hospital Stay:  · None    Procedures Performed:   · None    Significant Findings / Test Results:   · None    Incidental Findings:   · None     Test Results Pending at Discharge (will require follow up): · None     Outpatient Tests Requested:  · None    Complications:  None    Reason for Admission:  SIRS SECONDARY TO UTI    Hospital Course:     Naima Guillen is a 60 y o  female patient who originally presented to the hospital on 8/14/2020 due to fevers and headaches  Initially on admission patient met sirs criteria, evidence of UTI on urinalysis  Patient was treated with antibiotics ceftriaxone intravenous  Patient fever spikes gradually improved  Patient's headache adequately controlled with Tylenol and Motrin    Labs no evidence of leukocytosis, blood cultures have been negative  Urine cultures are pending  Patient is stable to be discharged to home  Patient is being discharged on 5 days course of Vantin  Please see above list of diagnoses and related plan for additional information  Condition at Discharge: good     Discharge Day Visit / Exam:     Subjective:  Patient seen at bedside today morning, in no acute apparent distress  Lateral oriented, no active complaints  No overnight events reported  Vitals: Blood Pressure: 114/75 (08/17/20 0815)  Pulse: 81 (08/17/20 0815)  Temperature: 97 7 °F (36 5 °C) (08/17/20 0815)  Temp Source: Tympanic (08/17/20 0815)  Respirations: 16 (08/17/20 0815)  Height: 5' (152 4 cm) (08/14/20 2003)  Weight - Scale: 84 7 kg (186 lb 11 7 oz) (08/14/20 2300)  SpO2: 95 % (08/17/20 0815)  Exam:   Physical Exam  Constitutional:       Appearance: Normal appearance  HENT:      Head: Normocephalic and atraumatic  Nose: Nose normal       Mouth/Throat:      Mouth: Mucous membranes are moist       Pharynx: Oropharynx is clear  Eyes:      Pupils: Pupils are equal, round, and reactive to light  Neck:      Musculoskeletal: Normal range of motion  Cardiovascular:      Rate and Rhythm: Normal rate and regular rhythm  Pulmonary:      Effort: Pulmonary effort is normal       Breath sounds: Normal breath sounds  Abdominal:      General: Abdomen is flat  Palpations: Abdomen is soft  Skin:     General: Skin is warm  Capillary Refill: Capillary refill takes less than 2 seconds  Neurological:      General: No focal deficit present  Mental Status: She is alert and oriented to person, place, and time  Cranial Nerves: No cranial nerve deficit  Sensory: No sensory deficit  Motor: No weakness     Psychiatric:         Mood and Affect: Mood normal          Behavior: Behavior normal              Discharge instructions/Information to patient and family:   See after visit summary for information provided to patient and family  Provisions for Follow-Up Care:  See after visit summary for information related to follow-up care and any pertinent home health orders  Disposition:     Home    For Discharges to [facility] SNF:   · Not Applicable to this Patient - Not Applicable to this Patient    Planned Readmission: *no     Discharge Statement:  I spent 25 minutes discharging the patient  This time was spent on the day of discharge  I had direct contact with the patient on the day of discharge  Greater than 50% of the total time was spent examining patient, answering all patient questions, arranging and discussing plan of care with patient as well as directly providing post-discharge instructions  Additional time then spent on discharge activities  Discharge Medications:  See after visit summary for reconciled discharge medications provided to patient and family

## 2020-08-18 ENCOUNTER — TELEPHONE (OUTPATIENT)
Dept: FAMILY MEDICINE CLINIC | Facility: CLINIC | Age: 65
End: 2020-08-18

## 2020-08-18 ENCOUNTER — TRANSITIONAL CARE MANAGEMENT (OUTPATIENT)
Dept: FAMILY MEDICINE CLINIC | Facility: CLINIC | Age: 65
End: 2020-08-18

## 2020-08-19 LAB
BACTERIA BLD CULT: NORMAL
BACTERIA BLD CULT: NORMAL
BACTERIA UR CULT: ABNORMAL

## 2020-08-21 ENCOUNTER — OFFICE VISIT (OUTPATIENT)
Dept: FAMILY MEDICINE CLINIC | Facility: CLINIC | Age: 65
End: 2020-08-21
Payer: COMMERCIAL

## 2020-08-21 VITALS
SYSTOLIC BLOOD PRESSURE: 114 MMHG | OXYGEN SATURATION: 97 % | HEART RATE: 72 BPM | TEMPERATURE: 98 F | HEIGHT: 59 IN | WEIGHT: 184 LBS | DIASTOLIC BLOOD PRESSURE: 70 MMHG | BODY MASS INDEX: 37.09 KG/M2

## 2020-08-21 DIAGNOSIS — R65.10 SIRS (SYSTEMIC INFLAMMATORY RESPONSE SYNDROME) (HCC): ICD-10-CM

## 2020-08-21 DIAGNOSIS — E55.9 VITAMIN D DEFICIENCY: ICD-10-CM

## 2020-08-21 DIAGNOSIS — N30.00 ACUTE CYSTITIS WITHOUT HEMATURIA: Primary | ICD-10-CM

## 2020-08-21 DIAGNOSIS — D50.8 IRON DEFICIENCY ANEMIA SECONDARY TO INADEQUATE DIETARY IRON INTAKE: ICD-10-CM

## 2020-08-21 PROBLEM — B34.9 ACUTE VIRAL SYNDROME: Status: RESOLVED | Noted: 2020-04-24 | Resolved: 2020-08-21

## 2020-08-21 PROCEDURE — 1111F DSCHRG MED/CURRENT MED MERGE: CPT | Performed by: FAMILY MEDICINE

## 2020-08-21 PROCEDURE — 3008F BODY MASS INDEX DOCD: CPT | Performed by: NURSE PRACTITIONER

## 2020-08-21 PROCEDURE — 99495 TRANSJ CARE MGMT MOD F2F 14D: CPT | Performed by: FAMILY MEDICINE

## 2020-08-21 RX ORDER — NAPROXEN 250 MG/1
250 TABLET ORAL AS NEEDED
COMMUNITY
End: 2021-07-27 | Stop reason: SDUPTHER

## 2020-08-21 RX ORDER — DIPHENOXYLATE HYDROCHLORIDE AND ATROPINE SULFATE 2.5; .025 MG/1; MG/1
1 TABLET ORAL DAILY
COMMUNITY

## 2020-08-21 NOTE — PROGRESS NOTES
BMI Counseling: Body mass index is 37 48 kg/m²  The BMI is above normal  Nutrition recommendations include decreasing portion sizes, encouraging healthy choices of fruits and vegetables, consuming healthier snacks and moderation in carbohydrate intake  Exercise recommendations include exercising 3-5 times per week  No pharmacotherapy was ordered  Assessment/Plan:  TCM Call (since 7/21/2020)     Date and time call was made  8/18/2020  2:44 PM    Hospital care reviewed  Records reviewed; Lab studies pending    Patient was hospitialized at  Other (comment)    2 Miriam Ramirez    Date of Admission  08/14/20    Date of discharge  08/17/20    Diagnosis  viral infection    Disposition  Home    Were the patients medications reviewed and updated  Yes    Current Symptoms  None      TCM Call (since 7/21/2020)     Post hospital issues  None    Should patient be enrolled in anticoag monitoring? No    Scheduled for follow up? Not Clinically Warranted    Not clinically warranted  just to follow up with PCP    Did you obtain your prescribed medications  Yes    Do you need help managing your prescriptions or medications  No    Is transportation to your appointment needed  No    I have advised the patient to call PCP with any new or worsening symptoms  Thomas Mercado  None    Are you recieving any outpatient services  No    Are you recieving home care services  No    Are you using any community resources  No    Current waiver services  No    Have you fallen in the last 12 months  No    Interperter language line needed  No    Counseling  Patient               Problem List Items Addressed This Visit        Genitourinary    UTI (urinary tract infection) - Primary     Pt doing much better  Cont Vantin 200 bid for 2 more days  No fever, chills, or urinary cxs  Pt to inc fluids  Call if sxs recur               Other    Vitamin D deficiency    Relevant Orders    Vitamin D 25 hydroxy    SIRS (systemic inflammatory response syndrome) (HCC)     Fever and chills resolved  Blood Cxs were negative  Iron deficiency anemia     Hgb 10 3 in hospital  Cont Fe pill qd  Relevant Orders    CBC and differential            Subjective:      Patient ID: Glenn Hooper is a 60 y o  female  Pt here for f/u hosp stay  Pt was admitted for fever and chills  Pt found to have UTI  Pt was started on IV abxs and fever resolved  Blood cultures were negative  Urine culture was + for E coli  Pt sent home with Vantin 200 bid  Pt has 2 days left of med  Feels better now  Still feels tired  No fever or chills  No urinary sxs  The following portions of the patient's history were reviewed and updated as appropriate:   She has no past medical history on file  ,  does not have any pertinent problems on file  ,   has a past surgical history that includes Gastric bypass; Mediastinoscopy; Cholecystectomy; and Mammo (historical) (05/2012)  ,  family history includes COPD in her mother; Diabetes in her father, maternal aunt, and mother; Heart disease in her father; Hypertension in her father, maternal grandmother, and mother; Stroke in her father  ,   reports that she has never smoked  She has never used smokeless tobacco  She reports previous alcohol use  She reports that she does not use drugs  ,  has No Known Allergies     Current Outpatient Medications   Medication Sig Dispense Refill    cefpodoxime (VANTIN) 200 mg tablet Take 1 tablet (200 mg total) by mouth 2 (two) times a day for 5 days 10 tablet 0    cholecalciferol (VITAMIN D3) 1,000 units tablet Take 1,000 Units by mouth daily       cyclobenzaprine (FLEXERIL) 5 mg tablet Take 1 tablet (5 mg total) by mouth 3 (three) times a day as needed for muscle spasms 30 tablet 3    Ferrous Sulfate (SLOW FE PO) Take by mouth      multivitamin (THERAGRAN) TABS Take 1 tablet by mouth daily      naproxen (NAPROSYN) 250 mg tablet Take 250 mg by mouth as needed for mild pain prn       No current facility-administered medications for this visit  Review of Systems   Constitutional: Negative for chills, fatigue and fever  Respiratory: Negative for chest tightness and shortness of breath  Cardiovascular: Negative for chest pain  Gastrointestinal: Negative for abdominal pain, nausea and vomiting  Genitourinary: Negative for difficulty urinating, dysuria, flank pain, frequency, hematuria and urgency  Musculoskeletal: Negative for back pain  Objective:  Vitals:    08/21/20 1557   BP: 114/70   Pulse: 72   Temp: 98 °F (36 7 °C)   SpO2: 97%   Weight: 83 5 kg (184 lb)   Height: 4' 10 75" (1 492 m)     Body mass index is 37 48 kg/m²  Physical Exam  Vitals signs and nursing note reviewed  Constitutional:       Appearance: Normal appearance  She is well-developed  She is obese  HENT:      Head: Normocephalic and atraumatic  Neck:      Musculoskeletal: Normal range of motion and neck supple  Cardiovascular:      Rate and Rhythm: Normal rate and regular rhythm  Heart sounds: Normal heart sounds  No murmur  Pulmonary:      Effort: Pulmonary effort is normal  No respiratory distress  Breath sounds: Normal breath sounds  No wheezing  Abdominal:      General: Abdomen is flat  Palpations: Abdomen is soft  Tenderness: There is no abdominal tenderness  Lymphadenopathy:      Cervical: No cervical adenopathy  Neurological:      Mental Status: She is alert and oriented to person, place, and time  Psychiatric:         Behavior: Behavior normal          Thought Content:  Thought content normal          Judgment: Judgment normal

## 2020-08-21 NOTE — ASSESSMENT & PLAN NOTE
Pt doing much better  Cont Vantin 200 bid for 2 more days  No fever, chills, or urinary cxs  Pt to inc fluids  Call if sxs recur

## 2020-09-18 ENCOUNTER — HOSPITAL ENCOUNTER (OUTPATIENT)
Dept: NON INVASIVE DIAGNOSTICS | Facility: CLINIC | Age: 65
Discharge: HOME | End: 2020-09-18
Payer: COMMERCIAL

## 2020-09-18 DIAGNOSIS — I83.813 VARICOSE VEINS OF BILATERAL LOWER EXTREMITIES WITH PAIN: ICD-10-CM

## 2020-09-18 PROCEDURE — 93970 EXTREMITY STUDY: CPT

## 2020-09-20 PROCEDURE — 93970 EXTREMITY STUDY: CPT | Performed by: SURGERY

## 2020-09-22 ENCOUNTER — TELEPHONE (OUTPATIENT)
Dept: VASCULAR SURGERY | Facility: CLINIC | Age: 65
End: 2020-09-22

## 2020-09-22 NOTE — TELEPHONE ENCOUNTER
COVID Pre-Visit Screening     1  Is this a family member screening? No  2  Have you traveled outside of your state in the past 2 weeks? No  3  Do you presently have a fever or flu-like symptoms? No  4  Do you have symptoms of an upper respiratory infection like runny nose, sore throat, or cough? No  5  Are you suffering from new headache that you have not had in the past?  No  6  Do you have/have you experienced any new shortness of breath recently? No  7  Do you have any new diarrhea, nausea or vomiting? No  8  Have you been in contact with anyone who has been sick or diagnosed with COVID-19? No  9  Do you have any new loss of taste or smell? No  10  Are you able to wear a mask without a valve for the entire visit? Yes  Confirmed with pt provider, date ,time and location address  Pt verbalized understanding

## 2020-09-23 ENCOUNTER — OFFICE VISIT (OUTPATIENT)
Dept: VASCULAR SURGERY | Facility: CLINIC | Age: 65
End: 2020-09-23
Payer: COMMERCIAL

## 2020-09-23 VITALS
HEIGHT: 59 IN | DIASTOLIC BLOOD PRESSURE: 82 MMHG | SYSTOLIC BLOOD PRESSURE: 124 MMHG | TEMPERATURE: 97.6 F | WEIGHT: 190 LBS | HEART RATE: 84 BPM | BODY MASS INDEX: 38.3 KG/M2

## 2020-09-23 DIAGNOSIS — I83.12 VARICOSE VEINS OF BOTH LOWER EXTREMITIES WITH INFLAMMATION: Primary | ICD-10-CM

## 2020-09-23 DIAGNOSIS — I83.11 VARICOSE VEINS OF BOTH LOWER EXTREMITIES WITH INFLAMMATION: Primary | ICD-10-CM

## 2020-09-23 PROCEDURE — 99214 OFFICE O/P EST MOD 30 MIN: CPT | Performed by: SURGERY

## 2020-09-23 PROCEDURE — 1036F TOBACCO NON-USER: CPT | Performed by: SURGERY

## 2020-09-23 NOTE — PROGRESS NOTES
Assessment/Plan:    Varicose veins of both lower extremities with inflammation  57yo Female, obese, hx of fibromyalgia who has bilateral chronic venous insufficiency  Presents after 3 months of conservative therapy with compression stockings, rest/elevation and to discuss results of her LEVDR  Reports that overall sx are stable  She is able to tolerate compression stockings 4hours at a time and then needs a break  She has not been elevating her legs as much as she should  LEVDR demonstrates deep and superficial venous reflux bilaterally  Her GSV are pretty small in size and overall I do not believe are a major contributor to her symptoms  Recommend continues conservative management  If symptoms progress or worsen can consider ablation of her GSV bilaterally  6 month follow-up          Problem List Items Addressed This Visit        Cardiovascular and Mediastinum    Varicose veins of both lower extremities with inflammation - Primary     57yo Female, obese, hx of fibromyalgia who has bilateral chronic venous insufficiency  Presents after 3 months of conservative therapy with compression stockings, rest/elevation and to discuss results of her LEVDR  Reports that overall sx are stable  She is able to tolerate compression stockings 4hours at a time and then needs a break  She has not been elevating her legs as much as she should  LEVDR demonstrates deep and superficial venous reflux bilaterally  Her GSV are pretty small in size and overall I do not believe are a major contributor to her symptoms  Recommend continues conservative management  If symptoms progress or worsen can consider ablation of her GSV bilaterally  6 month follow-up                  Subjective:      Patient ID: Ofelia Yates is a 60 y o  female  Patient had Jad Eris completed on 9/18/20  Complains of varicose veins to bilateral legs, right worse than left  Complains of heaviness, aching, edema to feet   Patient is wearing compression daily and elevating legs  The following portions of the patient's history were reviewed and updated as appropriate: allergies, current medications, past family history, past medical history, past social history, past surgical history and problem list     Review of Systems   Constitutional: Negative  HENT: Positive for postnasal drip  Eyes:        History of macular degeneration  Respiratory: Negative  Cardiovascular: Positive for leg swelling (Bilateral feet)  Varicose veins bilateral legs   Gastrointestinal: Negative  Endocrine: Negative  Genitourinary: Negative  Musculoskeletal: Positive for back pain  Aching and heaviness to bilateral legs   Skin: Negative  Neurological: Positive for dizziness (Occasional)  Hematological: Negative  Psychiatric/Behavioral: Negative  I have reviewed and updated the ROS as appropriate  Objective:      /82 (BP Location: Right arm, Patient Position: Sitting)   Pulse 84   Temp 97 6 °F (36 4 °C) (Tympanic)   Ht 4' 11" (1 499 m)   Wt 86 2 kg (190 lb)   BMI 38 38 kg/m²          Physical Exam  Constitutional:       Appearance: Normal appearance  She is obese  HENT:      Head: Normocephalic and atraumatic  Eyes:      Extraocular Movements: Extraocular movements intact  Pupils: Pupils are equal, round, and reactive to light  Neck:      Musculoskeletal: Normal range of motion and neck supple  Cardiovascular:      Rate and Rhythm: Normal rate and regular rhythm  Pulses: Normal pulses  Heart sounds: Normal heart sounds  Pulmonary:      Effort: Pulmonary effort is normal       Breath sounds: Normal breath sounds  Abdominal:      General: Abdomen is flat  Palpations: Abdomen is soft  Musculoskeletal:         General: Swelling and tenderness present  Right lower leg: Edema present  Left lower leg: Edema present        Comments: Stigmata of chronic venous stasis, large lateral varicose veins in bilateral thighs   Skin:     General: Skin is warm and dry  Neurological:      General: No focal deficit present  Mental Status: She is alert and oriented to person, place, and time     Psychiatric:         Mood and Affect: Mood normal          Behavior: Behavior normal

## 2020-09-23 NOTE — ASSESSMENT & PLAN NOTE
59yo Female, obese, hx of fibromyalgia who has bilateral chronic venous insufficiency  Presents after 3 months of conservative therapy with compression stockings, rest/elevation and to discuss results of her LEVDR  Reports that overall sx are stable  She is able to tolerate compression stockings 4hours at a time and then needs a break  She has not been elevating her legs as much as she should  LEVDR demonstrates deep and superficial venous reflux bilaterally  Her GSV are pretty small in size and overall I do not believe are a major contributor to her symptoms  Recommend continues conservative management  If symptoms progress or worsen can consider ablation of her GSV bilaterally       6 month follow-up

## 2020-11-12 NOTE — TELEPHONE ENCOUNTER
Needs TCM call and visit  Discharged yesterday  Subjective  Ellis Gutierrez is a 55 year old male.    Chief Complaint   Patient presents with   • Office Visit   • Follow-up       Patient is here for follow-up on diabetes hypertension hyperlipidemia.  Since last visit patient has started taking oral hypoglycemic Metformin for his diabetes.  Previously his insurance had  and he was taking no medications.  States his overall blood sugars when he monitors at home have improved to 130s from 200 and over.  His lab work however shows hemoglobin A1c is still suboptimal at 10.  Patient denies any complaints of visual problems no complaints of any polyuria no polydipsia.  No complaints of any chest pain no shortness of breath no orthopnea no PND.  Overall he feels his baseline.  He is currently not exercising.  Patient is taking his blood pressure medications daily however does not monitor blood pressure at home no complaints of headaches no dizziness no complaints of numbness no tingling.  No complaints of claudication no ulcers on his feet denies feeling depressed  Feliciano is complaining of intermittent episodes of knee pain bilateral states he has had it for many years but thinks as he is getting older gets worse he has some stiffness at times no complaints of any swelling denies any history of any injury pain intensity is mild about 3/10 has not taken anything for the pain pain does not radiate to the back of the knee.  No complaints of weakness of the knee he is walking without assistance of a cane      No past medical history on file.    No past surgical history on file.    Current Outpatient Medications   Medication Sig Dispense Refill   • metformin (GLUCOPHAGE) 1000 MG tablet Take 1 tablet by mouth 2 times daily (with meals). 60 tablet 3   • irbesartan (AVAPRO) 150 MG tablet Take 1 tablet by mouth nightly. 30 tablet 3   • metoPROLOL succinate (TOPROL-XL) 50 MG 24 hr tablet Take 1 tablet by mouth daily. 30 tablet 3     No current facility-administered medications  for this visit.        ALLERGIES:  No Known Allergies    No family history on file.     Social History     Socioeconomic History   • Marital status: Single     Spouse name: Not on file   • Number of children: Not on file   • Years of education: Not on file   • Highest education level: Not on file   Occupational History   • Not on file   Social Needs   • Financial resource strain: Not on file   • Food insecurity     Worry: Not on file     Inability: Not on file   • Transportation needs     Medical: Not on file     Non-medical: Not on file   Tobacco Use   • Smoking status: Never Smoker   • Smokeless tobacco: Never Used   Substance and Sexual Activity   • Alcohol use: Not on file   • Drug use: Not on file   • Sexual activity: Not on file   Lifestyle   • Physical activity     Days per week: Not on file     Minutes per session: Not on file   • Stress: Not on file   Relationships   • Social connections     Talks on phone: Not on file     Gets together: Not on file     Attends Hindu service: Not on file     Active member of club or organization: Not on file     Attends meetings of clubs or organizations: Not on file     Relationship status: Not on file   • Intimate partner violence     Fear of current or ex partner: Not on file     Emotionally abused: Not on file     Physically abused: Not on file     Forced sexual activity: Not on file   Other Topics Concern   • Not on file   Social History Narrative   • Not on file       Review of Systems   Constitutional: Negative.    HENT: Negative.    Eyes: Negative.    Respiratory: Negative for cough, shortness of breath and wheezing.    Cardiovascular: Negative for chest pain, palpitations and leg swelling.   Gastrointestinal: Negative.    Endocrine: Negative.    Genitourinary: Negative.    Musculoskeletal: Positive for arthralgias.   Skin: Negative.    Allergic/Immunologic: Negative.    Neurological: Negative.    Hematological: Negative.    Psychiatric/Behavioral: Negative.         Objective  Visit Vitals  BP (!) 148/91 (BP Location: LUE - Left upper extremity, Patient Position: Sitting, Cuff Size: Large Adult)   Pulse 77   Temp 98.2 °F (36.8 °C) (Tympanic)   Resp 18   Ht 5' 6\" (1.676 m)   Wt 101 kg (222 lb 10.6 oz)   SpO2 100%   BMI 35.94 kg/m²       Physical Exam  Vitals signs reviewed.   Constitutional:       Appearance: He is well-developed.   Eyes:      Conjunctiva/sclera: Conjunctivae normal.   Neck:      Musculoskeletal: Normal range of motion and neck supple.   Cardiovascular:      Rate and Rhythm: Normal rate and regular rhythm.      Heart sounds: No murmur.   Pulmonary:      Effort: Pulmonary effort is normal.      Breath sounds: Normal breath sounds.   Abdominal:      General: Bowel sounds are normal. There is no distension.      Palpations: Abdomen is soft.         Labs  Lab Results Reviewed,   Lab Results   Component Value Date    SODIUM 141 11/12/2020    POTASSIUM 4.1 11/12/2020    CHLORIDE 107 11/12/2020    CO2 28 11/12/2020    BUN 15 11/12/2020    CREATININE 1.08 11/12/2020    GLUCOSE 147 (H) 11/12/2020   ,   Lab Results   Component Value Date    WBC 4.6 08/31/2020    HCT 44.1 08/31/2020    HGB 14.5 08/31/2020     08/31/2020   ,   Hemoglobin A1C (%)   Date Value   11/12/2020 7.6 (H)   , No results found for: TSH,   Lab Results   Component Value Date    CHOLESTEROL 154 11/12/2020    HDL 46 11/12/2020    CALCLDL 85 11/12/2020    TRIGLYCERIDE 117 11/12/2020   ,   Lab Results   Component Value Date    AST 13 11/12/2020    GPT 18 11/12/2020    ALKPT 79 11/12/2020    BILIRUBIN 0.3 11/12/2020   , No results found for: COL, UAPP, USPG, UPH, UPROT, UGLU, UKET, UBILI, URBC, UNITR, UROB, UWBC,   EKG INTERPRETATION:  No results found for this or any previous visit. and   IMAGING STUDIES:  No results found for this or any previous visit.    Imaging  XR KNEE 3 VIEWS RIGHT  Narrative: EXAM: XR KNEE 3 VIEWS RIGHT    CLINICAL INDICATION: Chronic right knee pain.    COMPARISON:   None.    FINDINGS:     No acute fracture or dislocation.  Questionable suprapatellar joint effusion.    Mild narrowing of the medial tibiofemoral compartment.  Tricompartmental knee osteophytes.  Small spurring of the tibial spines.    Superior, inferior patellar and tibial tubercular enthesopathy.  Impression: Degenerative changes in the right knee joint.    FOR PHYSICIAN USE ONLY - Please note that this report was generated using voice recognition software.  If you require clarification or feel that there has been an error in this report please contact me through [employer].  Thank you very much for   allowing me to participate in the care of your patient.      Electronically Signed by: TAYLOR LAIRD   Signed on: 11/12/2020 3:08 PM       Assessment and Plan  1. Essential hypertension    2. Uncontrolled type 2 diabetes mellitus with hyperglycemia (CMS/[facility])    3. Chronic pain of right knee    4. Colon cancer screening    5. Health care maintenance         Type2 diabetes mellitus without complication without long-term use of insulin uncontrolled.  Patient stressed importance of seeing diabetic educator for better dietary input also discussed possibly starting on insulin patient is very reluctant and refuses states he is going to try diet and exercise.  Obtain diabetic panel avoid all sweets juices regular soda and cut down on carbs increase exercise.  If hemoglobin A1c still not at goal he will also start on Januvia next visit        Essential hypertension good control continue same meds cut down on salt and caffeine monitor blood pressure at home follow-up in 3 months        Mixed hyperlipidemia LDL not at goal we will start on slow dose of statin follow low-cholesterol diet repeat lipids        Chronic right knee pain appears to be more consistent with osteoarthritis however will need further evaluation with x-ray recommend passive range of motion use Voltaren over-the-counter 2% gel 3 times a day and if  symptoms still persist he will see orthopedics      he is also recommended to obtain surveillance screening colonoscopy      Orders Placed This Encounter   • SCREENING COLONOSCOPY   • XRAY KNEE 3 VW RIGHT     Scheduling Instructions:      Instructions for required prep will be provided at time of scheduling imaging exam.                • Microalbumin Urine Random   • Lipid Panel With Reflex   • PSA   • Glycohemoglobin   • Comprehensive Metabolic Panel   • SERVICE TO ORTHOPEDICS     Right knee pain and swelling intermittent chronic needs evaluation and treatment     Referral Priority:   Routine     Referral Type:   Consult & Treatment     Number of Visits Requested:   1     Expiration Date:   11/12/2021   • metformin (GLUCOPHAGE) 1000 MG tablet     Sig: Take 1 tablet by mouth 2 times daily (with meals).     Dispense:  60 tablet     Refill:  3   • irbesartan (AVAPRO) 150 MG tablet     Sig: Take 1 tablet by mouth nightly.     Dispense:  30 tablet     Refill:  3   • metoPROLOL succinate (TOPROL-XL) 50 MG 24 hr tablet     Sig: Take 1 tablet by mouth daily.     Dispense:  30 tablet     Refill:  3           Marylou Rubio MD  11/16/2020

## 2020-12-18 ENCOUNTER — TELEPHONE (OUTPATIENT)
Dept: FAMILY MEDICINE CLINIC | Facility: CLINIC | Age: 65
End: 2020-12-18

## 2021-02-24 ENCOUNTER — TELEPHONE (OUTPATIENT)
Dept: FAMILY MEDICINE CLINIC | Facility: CLINIC | Age: 66
End: 2021-02-24

## 2021-02-24 NOTE — TELEPHONE ENCOUNTER
Left detailed message letting patient know that it is a good idea to get the COVID vaccine and to [facility]

## 2021-03-14 PROBLEM — N39.0 UTI (URINARY TRACT INFECTION): Status: RESOLVED | Noted: 2020-08-16 | Resolved: 2021-03-14

## 2021-03-14 PROBLEM — I83.11 VARICOSE VEINS OF BOTH LOWER EXTREMITIES WITH INFLAMMATION: Status: RESOLVED | Noted: 2020-04-24 | Resolved: 2021-03-14

## 2021-03-14 PROBLEM — I83.12 VARICOSE VEINS OF BOTH LOWER EXTREMITIES WITH INFLAMMATION: Status: RESOLVED | Noted: 2020-04-24 | Resolved: 2021-03-14

## 2021-03-14 PROBLEM — R65.10 SIRS (SYSTEMIC INFLAMMATORY RESPONSE SYNDROME) (HCC): Status: RESOLVED | Noted: 2020-08-14 | Resolved: 2021-03-14

## 2021-03-15 PROBLEM — Z00.00 ENCOUNTER FOR ANNUAL PHYSICAL EXAM: Status: ACTIVE | Noted: 2021-03-15

## 2021-03-15 NOTE — PATIENT INSTRUCTIONS

## 2021-03-16 ENCOUNTER — OFFICE VISIT (OUTPATIENT)
Dept: FAMILY MEDICINE CLINIC | Facility: CLINIC | Age: 66
End: 2021-03-16
Payer: COMMERCIAL

## 2021-03-16 VITALS
BODY MASS INDEX: 42.54 KG/M2 | WEIGHT: 211 LBS | DIASTOLIC BLOOD PRESSURE: 70 MMHG | HEIGHT: 59 IN | TEMPERATURE: 97.3 F | SYSTOLIC BLOOD PRESSURE: 110 MMHG | HEART RATE: 83 BPM | OXYGEN SATURATION: 98 % | RESPIRATION RATE: 16 BRPM

## 2021-03-16 DIAGNOSIS — E55.9 VITAMIN D DEFICIENCY: ICD-10-CM

## 2021-03-16 DIAGNOSIS — R35.0 FREQUENT URINATION: Primary | ICD-10-CM

## 2021-03-16 DIAGNOSIS — E78.00 HYPERCHOLESTEROLEMIA: ICD-10-CM

## 2021-03-16 DIAGNOSIS — D50.8 IRON DEFICIENCY ANEMIA SECONDARY TO INADEQUATE DIETARY IRON INTAKE: ICD-10-CM

## 2021-03-16 DIAGNOSIS — R53.83 FATIGUE, UNSPECIFIED TYPE: ICD-10-CM

## 2021-03-16 DIAGNOSIS — M25.50 MULTIPLE JOINT PAIN: ICD-10-CM

## 2021-03-16 LAB
SL AMB  POCT GLUCOSE, UA: ABNORMAL
SL AMB LEUKOCYTE ESTERASE,UA: ABNORMAL
SL AMB POCT BILIRUBIN,UA: ABNORMAL
SL AMB POCT BLOOD,UA: ABNORMAL
SL AMB POCT CLARITY,UA: ABNORMAL
SL AMB POCT COLOR,UA: YELLOW
SL AMB POCT KETONES,UA: ABNORMAL
SL AMB POCT NITRITE,UA: ABNORMAL
SL AMB POCT PH,UA: 6.5
SL AMB POCT SPECIFIC GRAVITY,UA: 1.02
SL AMB POCT URINE PROTEIN: ABNORMAL
SL AMB POCT UROBILINOGEN: ABNORMAL

## 2021-03-16 PROCEDURE — 3725F SCREEN DEPRESSION PERFORMED: CPT | Performed by: FAMILY MEDICINE

## 2021-03-16 PROCEDURE — 81002 URINALYSIS NONAUTO W/O SCOPE: CPT | Performed by: FAMILY MEDICINE

## 2021-03-16 PROCEDURE — 99214 OFFICE O/P EST MOD 30 MIN: CPT | Performed by: FAMILY MEDICINE

## 2021-03-16 PROCEDURE — 1036F TOBACCO NON-USER: CPT | Performed by: FAMILY MEDICINE

## 2021-03-16 PROCEDURE — 3008F BODY MASS INDEX DOCD: CPT | Performed by: FAMILY MEDICINE

## 2021-03-16 NOTE — PROGRESS NOTES
BMI Counseling: Body mass index is 42 62 kg/m²  The BMI is above normal  Nutrition recommendations include decreasing portion sizes, encouraging healthy choices of fruits and vegetables, consuming healthier snacks and moderation in carbohydrate intake  Exercise recommendations include exercising 3-5 times per week  No pharmacotherapy was ordered  Assessment/Plan:         Problem List Items Addressed This Visit        Other    Vitamin D deficiency     Recheck level  Takes 1000 U qd  Relevant Orders    Vitamin D 25 hydroxy    Iron deficiency anemia     Recheck CBC and iron levels  Takes iron pill daily  Relevant Orders    Ferritin    Iron Saturation %    CBC and differential    Hypercholesterolemia     Recheck lipid panel  Continue low chol diet and exercise  Relevant Orders    Lipid panel    Frequent urination - Primary     Urine dip done today and was normal           Relevant Orders    POCT urine dip (Completed)    Fatigue     Will check labs  Relevant Orders    Comprehensive metabolic panel    TSH, 3rd generation with Free T4 reflex            Subjective:      Patient ID: Stacia Proctor is a 60 y o  female  Pt here for fatigue and dizziness  Has had it off and on for past few mths  Gets sob at times when exerts herself  No chest pain  Takes Fe pill and Vit D pill qd  Pt gaining weight  Has FH of diabetes  Pt denies frequent urination during day but wakes up 1-2 times per night  Sxs are worse as day goes on  Pt had COVID in May 2020 and had urosepsis in Aug 2020  Pt also has multiple joint pains  Has had it for mths         The following portions of the patient's history were reviewed and updated as appropriate:   Past Medical History:  She has no past medical history on file ,  _______________________________________________________________________  Medical Problems:  does not have any pertinent problems on file ,  _______________________________________________________________________  Past Surgical History:   has a past surgical history that includes Gastric bypass; Mediastinoscopy; Cholecystectomy; and Mammo (historical) (05/2012)  ,  _______________________________________________________________________  Family History:  family history includes COPD in her mother; Diabetes in her father, maternal aunt, and mother; Heart disease in her father; Hypertension in her father, maternal grandmother, and mother; Stroke in her father ,  _______________________________________________________________________  Social History:   reports that she has never smoked  She has never used smokeless tobacco  She reports previous alcohol use  She reports previous drug use ,  _______________________________________________________________________  Allergies:  has No Known Allergies     _______________________________________________________________________  Current Outpatient Medications   Medication Sig Dispense Refill    cholecalciferol (VITAMIN D3) 1,000 units tablet Take 1,000 Units by mouth daily       cyclobenzaprine (FLEXERIL) 5 mg tablet Take 1 tablet (5 mg total) by mouth 3 (three) times a day as needed for muscle spasms 30 tablet 3    Ferrous Sulfate (SLOW FE PO) Take by mouth daily       multivitamin (THERAGRAN) TABS Take 1 tablet by mouth daily      naproxen (NAPROSYN) 250 mg tablet Take 250 mg by mouth as needed for mild pain prn       No current facility-administered medications for this visit       _______________________________________________________________________  Review of Systems   Constitutional: Positive for fatigue  Negative for unexpected weight change  Respiratory: Positive for cough and shortness of breath  Negative for chest tightness  Cardiovascular: Negative for chest pain and palpitations  Gastrointestinal: Negative for abdominal pain, constipation, diarrhea, nausea and vomiting  Genitourinary: Positive for frequency  Negative for difficulty urinating  Musculoskeletal: Positive for arthralgias  Skin: Negative for rash  Neurological: Positive for dizziness  Negative for headaches  Objective:  Vitals:    03/16/21 1333   BP: 110/70   BP Location: Left arm   Patient Position: Sitting   Cuff Size: Large   Pulse: 83   Resp: 16   Temp: (!) 97 3 °F (36 3 °C)   SpO2: 98%   Weight: 95 7 kg (211 lb)   Height: 4' 11" (1 499 m)     Body mass index is 42 62 kg/m²  Physical Exam  Vitals signs and nursing note reviewed  Constitutional:       Appearance: Normal appearance  She is well-developed  She is obese  HENT:      Head: Normocephalic and atraumatic  Nose: No congestion  Mouth/Throat:      Pharynx: No posterior oropharyngeal erythema  Neck:      Musculoskeletal: Normal range of motion and neck supple  Cardiovascular:      Rate and Rhythm: Normal rate and regular rhythm  Heart sounds: Normal heart sounds  No murmur  Pulmonary:      Effort: Pulmonary effort is normal  No respiratory distress  Breath sounds: Normal breath sounds  No wheezing  Musculoskeletal:      Right lower leg: Edema present  Left lower leg: Edema present  Lymphadenopathy:      Cervical: No cervical adenopathy  Neurological:      Mental Status: She is alert and oriented to person, place, and time  Psychiatric:         Mood and Affect: Mood normal          Behavior: Behavior normal          Thought Content:  Thought content normal          Judgment: Judgment normal

## 2021-03-23 ENCOUNTER — APPOINTMENT (OUTPATIENT)
Dept: LAB | Facility: HOSPITAL | Age: 66
End: 2021-03-23
Attending: FAMILY MEDICINE
Payer: COMMERCIAL

## 2021-03-23 DIAGNOSIS — D50.8 IRON DEFICIENCY ANEMIA SECONDARY TO INADEQUATE DIETARY IRON INTAKE: ICD-10-CM

## 2021-03-23 DIAGNOSIS — R53.83 FATIGUE, UNSPECIFIED TYPE: ICD-10-CM

## 2021-03-23 DIAGNOSIS — E78.00 HYPERCHOLESTEROLEMIA: ICD-10-CM

## 2021-03-23 DIAGNOSIS — M25.50 MULTIPLE JOINT PAIN: ICD-10-CM

## 2021-03-23 DIAGNOSIS — E55.9 VITAMIN D DEFICIENCY: ICD-10-CM

## 2021-03-23 LAB
25(OH)D3 SERPL-MCNC: 17.7 NG/ML (ref 30–100)
ALBUMIN SERPL BCP-MCNC: 3.7 G/DL (ref 3.4–4.8)
ALP SERPL-CCNC: 84.1 U/L (ref 35–140)
ALT SERPL W P-5'-P-CCNC: 25 U/L (ref 5–54)
ANION GAP SERPL CALCULATED.3IONS-SCNC: 6 MMOL/L (ref 4–13)
AST SERPL W P-5'-P-CCNC: 32 U/L (ref 15–41)
BASOPHILS # BLD AUTO: 0.02 THOUSANDS/ΜL (ref 0–0.1)
BASOPHILS NFR BLD AUTO: 0 % (ref 0–1)
BILIRUB SERPL-MCNC: 0.57 MG/DL (ref 0.3–1.2)
BUN SERPL-MCNC: 10 MG/DL (ref 6–20)
CALCIUM SERPL-MCNC: 8.9 MG/DL (ref 8.4–10.2)
CHLORIDE SERPL-SCNC: 104 MMOL/L (ref 96–108)
CHOLEST SERPL-MCNC: 178 MG/DL
CO2 SERPL-SCNC: 28 MMOL/L (ref 22–33)
CREAT SERPL-MCNC: 0.87 MG/DL (ref 0.4–1.1)
CRP SERPL QL: 0.2 MG/L (ref 0–1)
EOSINOPHIL # BLD AUTO: 0.14 THOUSAND/ΜL (ref 0–0.61)
EOSINOPHIL NFR BLD AUTO: 3 % (ref 0–6)
ERYTHROCYTE [DISTWIDTH] IN BLOOD BY AUTOMATED COUNT: 15.8 % (ref 11.6–15.1)
FERRITIN SERPL-MCNC: 12 NG/ML (ref 8–388)
GFR SERPL CREATININE-BSD FRML MDRD: 71 ML/MIN/1.73SQ M
GLUCOSE P FAST SERPL-MCNC: 117 MG/DL (ref 70–105)
HCT VFR BLD AUTO: 35.8 % (ref 34.8–46.1)
HDLC SERPL-MCNC: 43 MG/DL
HGB BLD-MCNC: 10.9 G/DL (ref 11.5–15.4)
IMM GRANULOCYTES # BLD AUTO: 0.01 THOUSAND/UL (ref 0–0.2)
IMM GRANULOCYTES NFR BLD AUTO: 0 % (ref 0–2)
IRON SATN MFR SERPL: 6 %
IRON SERPL-MCNC: 27 UG/DL (ref 50–170)
LDLC SERPL CALC-MCNC: 111 MG/DL (ref 0–100)
LYMPHOCYTES # BLD AUTO: 1.85 THOUSANDS/ΜL (ref 0.6–4.47)
LYMPHOCYTES NFR BLD AUTO: 35 % (ref 14–44)
MCH RBC QN AUTO: 29.5 PG (ref 26.8–34.3)
MCHC RBC AUTO-ENTMCNC: 30.4 G/DL (ref 31.4–37.4)
MCV RBC AUTO: 97 FL (ref 82–98)
MONOCYTES # BLD AUTO: 0.42 THOUSAND/ΜL (ref 0.17–1.22)
MONOCYTES NFR BLD AUTO: 8 % (ref 4–12)
NEUTROPHILS # BLD AUTO: 2.79 THOUSANDS/ΜL (ref 1.85–7.62)
NEUTS SEG NFR BLD AUTO: 54 % (ref 43–75)
NONHDLC SERPL-MCNC: 135 MG/DL
PLATELET # BLD AUTO: 358 THOUSANDS/UL (ref 149–390)
PMV BLD AUTO: 10 FL (ref 8.9–12.7)
POTASSIUM SERPL-SCNC: 4.3 MMOL/L (ref 3.5–5)
PROT SERPL-MCNC: 6.8 G/DL (ref 6.4–8.3)
RBC # BLD AUTO: 3.7 MILLION/UL (ref 3.81–5.12)
RHEUMATOID FACT SER QL LA: NEGATIVE
SODIUM SERPL-SCNC: 138 MMOL/L (ref 133–145)
TIBC SERPL-MCNC: 417 UG/DL (ref 250–450)
TRIGL SERPL-MCNC: 119.1 MG/DL
TSH SERPL DL<=0.05 MIU/L-ACNC: 1.88 UIU/ML (ref 0.34–5.6)
WBC # BLD AUTO: 5.23 THOUSAND/UL (ref 4.31–10.16)

## 2021-03-23 PROCEDURE — 86140 C-REACTIVE PROTEIN: CPT

## 2021-03-23 PROCEDURE — 86430 RHEUMATOID FACTOR TEST QUAL: CPT

## 2021-03-23 PROCEDURE — 83540 ASSAY OF IRON: CPT

## 2021-03-23 PROCEDURE — 84443 ASSAY THYROID STIM HORMONE: CPT

## 2021-03-23 PROCEDURE — 80061 LIPID PANEL: CPT

## 2021-03-23 PROCEDURE — 86618 LYME DISEASE ANTIBODY: CPT

## 2021-03-23 PROCEDURE — 83550 IRON BINDING TEST: CPT

## 2021-03-23 PROCEDURE — 80053 COMPREHEN METABOLIC PANEL: CPT

## 2021-03-23 PROCEDURE — 36415 COLL VENOUS BLD VENIPUNCTURE: CPT

## 2021-03-23 PROCEDURE — 85025 COMPLETE CBC W/AUTO DIFF WBC: CPT

## 2021-03-23 PROCEDURE — 82728 ASSAY OF FERRITIN: CPT

## 2021-03-23 PROCEDURE — 82306 VITAMIN D 25 HYDROXY: CPT

## 2021-03-23 PROCEDURE — 86038 ANTINUCLEAR ANTIBODIES: CPT

## 2021-03-24 DIAGNOSIS — D50.9 IRON DEFICIENCY ANEMIA, UNSPECIFIED IRON DEFICIENCY ANEMIA TYPE: ICD-10-CM

## 2021-03-24 DIAGNOSIS — E55.9 VITAMIN D DEFICIENCY: Primary | ICD-10-CM

## 2021-03-24 LAB
B BURGDOR IGG+IGM SER-ACNC: 67
RYE IGE QN: NEGATIVE

## 2021-03-24 RX ORDER — CHOLECALCIFEROL (VITAMIN D3) 1250 MCG
CAPSULE ORAL
COMMUNITY
End: 2022-03-28

## 2021-05-18 ENCOUNTER — TELEPHONE (OUTPATIENT)
Dept: FAMILY MEDICINE CLINIC | Facility: CLINIC | Age: 66
End: 2021-05-18

## 2021-05-18 NOTE — TELEPHONE ENCOUNTER
Patient wants to know if labs requested to be completed on 06/2021 patient wants to know if may have completed this week   ND

## 2021-07-16 ENCOUNTER — APPOINTMENT (OUTPATIENT)
Dept: LAB | Facility: HOSPITAL | Age: 66
End: 2021-07-16
Attending: FAMILY MEDICINE
Payer: COMMERCIAL

## 2021-07-16 DIAGNOSIS — D50.9 IRON DEFICIENCY ANEMIA, UNSPECIFIED IRON DEFICIENCY ANEMIA TYPE: ICD-10-CM

## 2021-07-16 DIAGNOSIS — E55.9 VITAMIN D DEFICIENCY: ICD-10-CM

## 2021-07-16 LAB
BASOPHILS # BLD AUTO: 0.02 THOUSANDS/ΜL (ref 0–0.1)
BASOPHILS NFR BLD AUTO: 0 % (ref 0–1)
EOSINOPHIL # BLD AUTO: 0.21 THOUSAND/ΜL (ref 0–0.61)
EOSINOPHIL NFR BLD AUTO: 3 % (ref 0–6)
ERYTHROCYTE [DISTWIDTH] IN BLOOD BY AUTOMATED COUNT: 16.8 % (ref 11.6–15.1)
FERRITIN SERPL-MCNC: 12 NG/ML (ref 8–388)
HCT VFR BLD AUTO: 33.5 % (ref 34.8–46.1)
HGB BLD-MCNC: 10.3 G/DL (ref 11.5–15.4)
IMM GRANULOCYTES # BLD AUTO: 0.01 THOUSAND/UL (ref 0–0.2)
IMM GRANULOCYTES NFR BLD AUTO: 0 % (ref 0–2)
LYMPHOCYTES # BLD AUTO: 2.22 THOUSANDS/ΜL (ref 0.6–4.47)
LYMPHOCYTES NFR BLD AUTO: 34 % (ref 14–44)
MCH RBC QN AUTO: 29.6 PG (ref 26.8–34.3)
MCHC RBC AUTO-ENTMCNC: 30.7 G/DL (ref 31.4–37.4)
MCV RBC AUTO: 96 FL (ref 82–98)
MONOCYTES # BLD AUTO: 0.47 THOUSAND/ΜL (ref 0.17–1.22)
MONOCYTES NFR BLD AUTO: 7 % (ref 4–12)
NEUTROPHILS # BLD AUTO: 3.65 THOUSANDS/ΜL (ref 1.85–7.62)
NEUTS SEG NFR BLD AUTO: 56 % (ref 43–75)
PLATELET # BLD AUTO: 343 THOUSANDS/UL (ref 149–390)
PMV BLD AUTO: 10.4 FL (ref 8.9–12.7)
RBC # BLD AUTO: 3.48 MILLION/UL (ref 3.81–5.12)
WBC # BLD AUTO: 6.58 THOUSAND/UL (ref 4.31–10.16)

## 2021-07-16 PROCEDURE — 36415 COLL VENOUS BLD VENIPUNCTURE: CPT

## 2021-07-16 PROCEDURE — 82728 ASSAY OF FERRITIN: CPT

## 2021-07-16 PROCEDURE — 85025 COMPLETE CBC W/AUTO DIFF WBC: CPT

## 2021-07-16 PROCEDURE — 82306 VITAMIN D 25 HYDROXY: CPT

## 2021-07-17 LAB — 25(OH)D3 SERPL-MCNC: 21.9 NG/ML (ref 30–100)

## 2021-07-27 DIAGNOSIS — R52 PAIN: ICD-10-CM

## 2021-07-27 RX ORDER — CYCLOBENZAPRINE HCL 5 MG
5 TABLET ORAL 3 TIMES DAILY PRN
Qty: 30 TABLET | Refills: 3 | Status: SHIPPED | OUTPATIENT
Start: 2021-07-27 | End: 2022-03-28 | Stop reason: SDUPTHER

## 2021-07-27 RX ORDER — NAPROXEN 500 MG/1
500 TABLET ORAL AS NEEDED
Qty: 30 TABLET | Refills: 1 | Status: SHIPPED | OUTPATIENT
Start: 2021-07-27 | End: 2022-01-10

## 2021-07-27 RX ORDER — NAPROXEN 500 MG/1
500 TABLET ORAL 2 TIMES DAILY WITH MEALS
COMMUNITY
End: 2021-07-27 | Stop reason: SDUPTHER

## 2021-07-27 NOTE — TELEPHONE ENCOUNTER
Pt called for refill of cyclobenzaprine and flexeril    Order created and pending approval   Eldridge Pouch

## 2021-07-29 ENCOUNTER — TELEPHONE (OUTPATIENT)
Dept: VASCULAR SURGERY | Facility: CLINIC | Age: 66
End: 2021-07-29

## 2021-07-29 DIAGNOSIS — I83.11 VARICOSE VEINS OF BOTH LOWER EXTREMITIES WITH INFLAMMATION: Primary | ICD-10-CM

## 2021-07-29 DIAGNOSIS — I83.12 VARICOSE VEINS OF BOTH LOWER EXTREMITIES WITH INFLAMMATION: Primary | ICD-10-CM

## 2021-07-29 NOTE — TELEPHONE ENCOUNTER
Attempted to contact patient to schedule appointment(s) listed below  Requested patient call [phone] option 3 to schedule appointment(s)  Patient's appointment(s) are past due, expected ASAP      Dopplers  [] Abdominal Aorta Iliac (AOIL)  [] Carotid (CV)   [] Celiac and/or Mesenteric  [] Endovascular Aortic Repair (EVAR)   [] Hemodialysis Access (HD)   [] Lower Limb Arterial (GLORIA)  [] Lower Limb Venous Duplex with Reflux (LEVDR)  [] Renal Artery  [] Upper Limb (UEA)    Advanced Imaging   [] CTA abdomen    [] CTA abdomen & pelvis    [] CT abdomen with/ without contrast  [] CT abdomen with contrast  [] CT abdomen without contrast    [] CT abdomen & pelvis with/ without contrast  [] CT abdomen & pelvis with contrast  [] CT abdomen & pelvis without contrast    Office Visit   [] New patient, patient last seen over 3 years ago  [x] Risk factor modification     6m follow up - [facility] - Per Dr Dominique Casiano, no testing  Patient was scheduled for 3/24/2021 and cancelled appointment

## 2021-09-07 ENCOUNTER — OFFICE VISIT (OUTPATIENT)
Dept: VASCULAR SURGERY | Facility: CLINIC | Age: 66
End: 2021-09-07
Payer: COMMERCIAL

## 2021-09-07 VITALS
BODY MASS INDEX: 43.95 KG/M2 | HEIGHT: 59 IN | TEMPERATURE: 98.1 F | SYSTOLIC BLOOD PRESSURE: 120 MMHG | DIASTOLIC BLOOD PRESSURE: 86 MMHG | WEIGHT: 218 LBS | HEART RATE: 74 BPM

## 2021-09-07 DIAGNOSIS — I83.813 VARICOSE VEINS OF BILATERAL LOWER EXTREMITIES WITH PAIN: Primary | ICD-10-CM

## 2021-09-07 PROCEDURE — 3008F BODY MASS INDEX DOCD: CPT | Performed by: SURGERY

## 2021-09-07 PROCEDURE — 99215 OFFICE O/P EST HI 40 MIN: CPT | Performed by: SURGERY

## 2021-09-07 PROCEDURE — 1036F TOBACCO NON-USER: CPT | Performed by: SURGERY

## 2021-09-07 NOTE — ASSESSMENT & PLAN NOTE
Well controlled with conservative measures  Patient compliant with compression stockings, rest and elevation  On review of previous reflux study has superficial and deep venous insufficiency  Her superficial veins are too small to offer ablation therapy  Recommend continued conservative management  If symptoms worsen overtime can reassess with reflux study       PRN clinic f/u

## 2021-09-07 NOTE — PROGRESS NOTES
Assessment/Plan:    Varicose veins of bilateral lower extremities with pain  Well controlled with conservative measures  Patient compliant with compression stockings, rest and elevation  On review of previous reflux study has superficial and deep venous insufficiency  Her superficial veins are too small to offer ablation therapy  Recommend continued conservative management  If symptoms worsen overtime can reassess with reflux study  PR[facility] clinic f/u          Problem List Items Addressed This Visit        Cardiovascular and Mediastinum    Varicose veins of bilateral lower extremities with pain - Primary     Well controlled with conservative measures  Patient compliant with compression stockings, rest and elevation  On review of previous reflux study has superficial and deep venous insufficiency  Her superficial veins are too small to offer ablation therapy  Recommend continued conservative management  If symptoms worsen overtime can reassess with reflux study  [facility] clinic f/u                  Subjective:      Patient ID: Robson Lechuga is a 73 y o  female  Patient reports experiencing symptomatic VV of BLE w/ pain and swelling  Most recent reflux study done 9/18/20  Presents today for further evaluation  The following portions of the patient's history were reviewed and updated as appropriate: allergies, current medications, past family history, past medical history, past social history, past surgical history and problem list     Review of Systems   Constitutional: Negative  HENT: Negative  Eyes: Negative  Respiratory: Negative  Cardiovascular: Positive for leg swelling  Painful veins   Gastrointestinal: Negative  Endocrine: Negative  Genitourinary: Negative  Musculoskeletal: Negative  Skin: Negative  Allergic/Immunologic: Negative  Neurological: Negative  Hematological: Negative  Psychiatric/Behavioral: Negative        I have reviewed and updated the ROS as appropriate  Objective:      /86 (BP Location: Right arm, Patient Position: Sitting)   Pulse 74   Temp 98 1 °F (36 7 °C) (Tympanic)   Ht 4' 11" (1 499 m)   Wt 98 9 kg (218 lb)   BMI 44 03 kg/m²          Physical Exam  Constitutional:       Appearance: Normal appearance  HENT:      Head: Normocephalic and atraumatic  Nose: Nose normal       Mouth/Throat:      Mouth: Mucous membranes are dry  Eyes:      Extraocular Movements: Extraocular movements intact  Pupils: Pupils are equal, round, and reactive to light  Cardiovascular:      Rate and Rhythm: Normal rate and regular rhythm  Pulses: Normal pulses  Pulmonary:      Effort: Pulmonary effort is normal       Breath sounds: Normal breath sounds  Abdominal:      General: Abdomen is flat  Palpations: Abdomen is soft  Musculoskeletal:      Cervical back: Normal range of motion and neck supple  Right lower leg: Edema present  Left lower leg: Edema present  Skin:     General: Skin is warm and dry  Capillary Refill: Capillary refill takes less than 2 seconds  Neurological:      General: No focal deficit present  Mental Status: She is alert and oriented to person, place, and time  Psychiatric:         Mood and Affect: Mood normal          Behavior: Behavior normal          Thought Content:  Thought content normal          Judgment: Judgment normal

## 2021-09-13 ENCOUNTER — TELEPHONE (OUTPATIENT)
Dept: FAMILY MEDICINE CLINIC | Facility: CLINIC | Age: 66
End: 2021-09-13

## 2021-09-13 DIAGNOSIS — E55.9 VITAMIN D DEFICIENCY: Primary | ICD-10-CM

## 2021-09-13 DIAGNOSIS — R05.3 PERSISTENT COUGH: Primary | ICD-10-CM

## 2021-09-14 NOTE — TELEPHONE ENCOUNTER
Patient aware- also wants to let you know she still has a cough from when she saw you last- its actually keeping her up at night  Anything she should do?  Taking mucinex- not really clearing anything up- no other symptoms

## 2021-09-15 ENCOUNTER — HOSPITAL ENCOUNTER (OUTPATIENT)
Dept: RADIOLOGY | Facility: HOSPITAL | Age: 66
Discharge: HOME | End: 2021-09-15
Attending: FAMILY MEDICINE
Payer: COMMERCIAL

## 2021-09-15 DIAGNOSIS — R05.3 PERSISTENT COUGH: ICD-10-CM

## 2021-09-15 PROCEDURE — 71046 X-RAY EXAM CHEST 2 VIEWS: CPT

## 2021-10-16 ENCOUNTER — APPOINTMENT (OUTPATIENT)
Dept: LAB | Facility: HOSPITAL | Age: 66
End: 2021-10-16
Attending: FAMILY MEDICINE
Payer: COMMERCIAL

## 2021-10-16 DIAGNOSIS — E55.9 VITAMIN D DEFICIENCY: ICD-10-CM

## 2021-10-16 LAB — 25(OH)D3 SERPL-MCNC: 24.8 NG/ML (ref 30–100)

## 2021-10-16 PROCEDURE — 82306 VITAMIN D 25 HYDROXY: CPT

## 2021-10-16 PROCEDURE — 36415 COLL VENOUS BLD VENIPUNCTURE: CPT

## 2022-01-09 DIAGNOSIS — R52 PAIN: ICD-10-CM

## 2022-01-10 RX ORDER — NAPROXEN 500 MG/1
TABLET ORAL
Qty: 30 TABLET | Refills: 0 | Status: SHIPPED | OUTPATIENT
Start: 2022-01-10 | End: 2022-02-22

## 2022-02-09 ENCOUNTER — TELEPHONE (OUTPATIENT)
Dept: FAMILY MEDICINE CLINIC | Facility: CLINIC | Age: 67
End: 2022-02-09

## 2022-02-09 DIAGNOSIS — E55.9 VITAMIN D DEFICIENCY: Primary | ICD-10-CM

## 2022-02-22 ENCOUNTER — TELEPHONE (OUTPATIENT)
Dept: FAMILY MEDICINE CLINIC | Facility: CLINIC | Age: 67
End: 2022-02-22

## 2022-02-22 DIAGNOSIS — R52 PAIN: ICD-10-CM

## 2022-02-22 RX ORDER — NAPROXEN 500 MG/1
TABLET ORAL
Qty: 30 TABLET | Refills: 0 | Status: SHIPPED | OUTPATIENT
Start: 2022-02-22 | End: 2022-03-28 | Stop reason: SDUPTHER

## 2022-02-23 ENCOUNTER — APPOINTMENT (OUTPATIENT)
Dept: LAB | Facility: HOSPITAL | Age: 67
End: 2022-02-23
Attending: FAMILY MEDICINE
Payer: COMMERCIAL

## 2022-02-23 DIAGNOSIS — E55.9 VITAMIN D DEFICIENCY: ICD-10-CM

## 2022-02-23 LAB — 25(OH)D3 SERPL-MCNC: 24.8 NG/ML (ref 30–100)

## 2022-02-23 PROCEDURE — 82306 VITAMIN D 25 HYDROXY: CPT

## 2022-02-23 PROCEDURE — 36415 COLL VENOUS BLD VENIPUNCTURE: CPT

## 2022-02-25 DIAGNOSIS — E55.9 VITAMIN D DEFICIENCY: Primary | ICD-10-CM

## 2022-03-10 ENCOUNTER — RA CDI HCC (OUTPATIENT)
Dept: OTHER | Facility: HOSPITAL | Age: 67
End: 2022-03-10

## 2022-03-10 NOTE — PROGRESS NOTES
[facility]  coding opportunities             Chart Reviewed * (Number of) [place] suggestions sent to Provider: 1   E66 01 Morbid (severe) obesity due to excess calories ([facility] 75 ) -   *Per CMS/ICD 10 coding guidelines, BMI of 40 or higher; Class 3 obesity is sometimes categorized as "morbid,""extreme," or "severe" obesity        If this is correct, please document and assess at your next visit,3/16/2022               Patients insurance company: [address] [place] [address] (Medicare and Commercial for [employer] and [employer])

## 2022-03-21 ENCOUNTER — RA CDI HCC (OUTPATIENT)
Dept: OTHER | Facility: HOSPITAL | Age: 67
End: 2022-03-21

## 2022-03-21 NOTE — PROGRESS NOTES
[facility] 75  coding opportunities          Chart Reviewed number of suggestions sent to Provider: 1     Patients Insurance        Commercial Insurance: [employer]el 93     [id number]

## 2022-03-28 ENCOUNTER — OFFICE VISIT (OUTPATIENT)
Dept: FAMILY MEDICINE CLINIC | Facility: CLINIC | Age: 67
End: 2022-03-28
Payer: COMMERCIAL

## 2022-03-28 VITALS
SYSTOLIC BLOOD PRESSURE: 122 MMHG | HEART RATE: 70 BPM | DIASTOLIC BLOOD PRESSURE: 82 MMHG | BODY MASS INDEX: 45.16 KG/M2 | TEMPERATURE: 97.1 F | OXYGEN SATURATION: 97 % | WEIGHT: 224 LBS | RESPIRATION RATE: 16 BRPM | HEIGHT: 59 IN

## 2022-03-28 DIAGNOSIS — D64.9 ANEMIA, UNSPECIFIED TYPE: ICD-10-CM

## 2022-03-28 DIAGNOSIS — Z13.1 SCREENING FOR DIABETES MELLITUS: ICD-10-CM

## 2022-03-28 DIAGNOSIS — R52 PAIN: ICD-10-CM

## 2022-03-28 DIAGNOSIS — E55.9 VITAMIN D DEFICIENCY: ICD-10-CM

## 2022-03-28 DIAGNOSIS — J20.9 ACUTE BRONCHITIS, UNSPECIFIED ORGANISM: ICD-10-CM

## 2022-03-28 DIAGNOSIS — Z13.820 ENCOUNTER FOR OSTEOPOROSIS SCREENING IN ASYMPTOMATIC POSTMENOPAUSAL PATIENT: ICD-10-CM

## 2022-03-28 DIAGNOSIS — E66.01 MORBID OBESITY DUE TO EXCESS CALORIES (HCC): ICD-10-CM

## 2022-03-28 DIAGNOSIS — Z12.11 SCREENING FOR COLON CANCER: ICD-10-CM

## 2022-03-28 DIAGNOSIS — Z78.0 ENCOUNTER FOR OSTEOPOROSIS SCREENING IN ASYMPTOMATIC POSTMENOPAUSAL PATIENT: ICD-10-CM

## 2022-03-28 DIAGNOSIS — Z23 ENCOUNTER FOR IMMUNIZATION: ICD-10-CM

## 2022-03-28 DIAGNOSIS — Z00.00 ENCOUNTER FOR ANNUAL PHYSICAL EXAM: Primary | ICD-10-CM

## 2022-03-28 DIAGNOSIS — Z12.31 ENCOUNTER FOR SCREENING MAMMOGRAM FOR BREAST CANCER: ICD-10-CM

## 2022-03-28 DIAGNOSIS — Z13.6 SCREENING FOR CARDIOVASCULAR CONDITION: ICD-10-CM

## 2022-03-28 PROBLEM — R53.83 FATIGUE: Status: RESOLVED | Noted: 2021-03-16 | Resolved: 2022-03-28

## 2022-03-28 PROBLEM — R35.0 FREQUENT URINATION: Status: RESOLVED | Noted: 2021-03-16 | Resolved: 2022-03-28

## 2022-03-28 PROCEDURE — 1160F RVW MEDS BY RX/DR IN RCRD: CPT | Performed by: FAMILY MEDICINE

## 2022-03-28 PROCEDURE — 90732 PPSV23 VACC 2 YRS+ SUBQ/IM: CPT | Performed by: FAMILY MEDICINE

## 2022-03-28 PROCEDURE — 3725F SCREEN DEPRESSION PERFORMED: CPT | Performed by: FAMILY MEDICINE

## 2022-03-28 PROCEDURE — 90715 TDAP VACCINE 7 YRS/> IM: CPT | Performed by: FAMILY MEDICINE

## 2022-03-28 PROCEDURE — 1101F PT FALLS ASSESS-DOCD LE1/YR: CPT | Performed by: FAMILY MEDICINE

## 2022-03-28 PROCEDURE — 1036F TOBACCO NON-USER: CPT | Performed by: FAMILY MEDICINE

## 2022-03-28 PROCEDURE — 90472 IMMUNIZATION ADMIN EACH ADD: CPT | Performed by: FAMILY MEDICINE

## 2022-03-28 PROCEDURE — 3288F FALL RISK ASSESSMENT DOCD: CPT | Performed by: FAMILY MEDICINE

## 2022-03-28 PROCEDURE — 90471 IMMUNIZATION ADMIN: CPT | Performed by: FAMILY MEDICINE

## 2022-03-28 PROCEDURE — 99397 PER PM REEVAL EST PAT 65+ YR: CPT | Performed by: FAMILY MEDICINE

## 2022-03-28 PROCEDURE — 99213 OFFICE O/P EST LOW 20 MIN: CPT | Performed by: FAMILY MEDICINE

## 2022-03-28 PROCEDURE — 3008F BODY MASS INDEX DOCD: CPT | Performed by: FAMILY MEDICINE

## 2022-03-28 PROCEDURE — 4040F PNEUMOC VAC/ADMIN/RCVD: CPT | Performed by: FAMILY MEDICINE

## 2022-03-28 RX ORDER — CYCLOBENZAPRINE HCL 5 MG
5 TABLET ORAL 3 TIMES DAILY PRN
Qty: 30 TABLET | Refills: 5 | Status: SHIPPED | OUTPATIENT
Start: 2022-03-28

## 2022-03-28 RX ORDER — AZITHROMYCIN 250 MG/1
TABLET, FILM COATED ORAL
Qty: 6 TABLET | Refills: 0 | Status: SHIPPED | OUTPATIENT
Start: 2022-03-28 | End: 2022-04-02

## 2022-03-28 RX ORDER — NAPROXEN 500 MG/1
TABLET ORAL
Qty: 30 TABLET | Refills: 5 | Status: SHIPPED | OUTPATIENT
Start: 2022-03-28

## 2022-03-28 NOTE — PROGRESS NOTES
BMI Counseling: Body mass index is 45 24 kg/m²  The BMI is above normal  Nutrition recommendations include decreasing portion sizes, encouraging healthy choices of fruits and vegetables, consuming healthier snacks and moderation in carbohydrate intake  Exercise recommendations include exercising 3-5 times per week  No pharmacotherapy was ordered  Rationale for BMI follow-up plan is due to patient being overweight or obese  Depression Screening and Follow-up Plan: Patient was screened for depression during today's encounter  They screened negative with a PHQ-2 score of 0  Assessment/Plan:         Problem List Items Addressed This Visit        Respiratory    Acute bronchitis     Pt has had sxs for 2 weeks and not getting better  Will start abxs and pt told to take mucinex bid  Call if persists  Relevant Medications    azithromycin (Zithromax) 250 mg tablet       Other    Vitamin D deficiency     Level was 24 8 in Feb 2022 and pt told to increase Vit D  Pt now alternates 5000 U and 00669 U  Recheck in 3 months  Morbid obesity due to excess calories (HCC)     Discussed smaller portions, healthy snacks, and regular exercise  Encounter for annual physical exam - Primary     CPE done  Tdap and pneumovacc 23 given  Had COVID vaccines  Recommend Shingrix  Will check lipids and FBS  Pt advised to schedule mammogram, dexascan, and colonoscopy  Pt advised to follow a well balanced, heart healthy diet and to exercise on a regular basis  Not a smoker  BP good  Anemia     Recheck CBC and Fe studies            Relevant Orders    CBC and differential    Ferritin    Iron Saturation %      Other Visit Diagnoses     Screening for diabetes mellitus        Relevant Orders    Glucose, fasting    Screening for cardiovascular condition        Relevant Orders    Lipid panel    Encounter for screening mammogram for breast cancer        Relevant Orders    Mammo screening bilateral w cad    Screening for colon cancer        Relevant Orders    Ambulatory referral for colonoscopy    Encounter for osteoporosis screening in asymptomatic postmenopausal patient        Relevant Orders    DXA bone density spine hip and pelvis    Encounter for immunization        Relevant Orders    TDAP VACCINE GREATER THAN OR EQUAL TO 6YO IM    PNEUMOCOCCAL POLYSACCHARIDE VACCINE 23-VALENT =>1YO SQ IM    Pain        Relevant Medications    cyclobenzaprine (FLEXERIL) 5 mg tablet    naproxen (NAPROSYN) 500 mg tablet            Subjective:      Patient ID: Vic Smith is a 73 y o  female  Pt here for physical  Doing ok  No cp/sob  No headaches  No abd pain  Gets pain in knees due to OA  Unsure of last Tdap  Had COVID vaccines and going to get booster  Doesn't get flu shot  Hasn't had Pap and mammogram  Never had bone density test  Had colonoscopy in the past and had 1 polyp  Walks at times  Not a smoker  No ETOH  Pt has had cough for past 2 weeks and brings up mucus at times  No wheezing or sob  No fever or chills  No other sxs/       The following portions of the patient's history were reviewed and updated as appropriate:   Past Medical History:  She has no past medical history on file ,  _______________________________________________________________________  Medical Problems:  does not have any pertinent problems on file ,  _______________________________________________________________________  Past Surgical History:   has a past surgical history that includes Gastric bypass; Mediastinoscopy; Cholecystectomy; and Mammo (historical) (05/2012)  ,  _______________________________________________________________________  Family History:  family history includes COPD in her mother; Diabetes in her father, maternal aunt, and mother; Heart disease in her father; Hypertension in her father, maternal grandmother, and mother; Stroke in her father ,  _______________________________________________________________________  Social History: reports that she has never smoked  She has never used smokeless tobacco  She reports previous alcohol use  She reports previous drug use ,  _______________________________________________________________________  Allergies:  has No Known Allergies     _______________________________________________________________________  Current Outpatient Medications   Medication Sig Dispense Refill    cholecalciferol (VITAMIN D3) 1,000 units tablet Take 5,000 Units by mouth every other day      cyclobenzaprine (FLEXERIL) 5 mg tablet Take 1 tablet (5 mg total) by mouth 3 (three) times a day as needed for muscle spasms 30 tablet 5    Ferrous Sulfate (SLOW FE PO) Take by mouth 2 (two) times a day      multivitamin (THERAGRAN) TABS Take 1 tablet by mouth daily      naproxen (NAPROSYN) 500 mg tablet Take 1 tab as needed every 12 hrs 30 tablet 5    azithromycin (Zithromax) 250 mg tablet Take 2 tablets (500 mg total) by mouth daily for 1 day, THEN 1 tablet (250 mg total) daily for 4 days  6 tablet 0     No current facility-administered medications for this visit      _______________________________________________________________________  Review of Systems   Constitutional: Negative for activity change, appetite change, fatigue and unexpected weight change  HENT: Negative for congestion and sore throat  Eyes: Negative for visual disturbance  Respiratory: Positive for cough  Negative for chest tightness and shortness of breath  Cardiovascular: Positive for leg swelling  Negative for chest pain and palpitations  Gastrointestinal: Negative for abdominal pain, constipation, diarrhea, nausea and vomiting  Genitourinary: Negative for difficulty urinating, dysuria, frequency and hematuria  Musculoskeletal: Positive for arthralgias  Negative for back pain, gait problem and myalgias  Skin: Negative for color change, rash and wound  Neurological: Negative for dizziness, weakness, light-headedness, numbness and headaches  Hematological: Negative for adenopathy  Does not bruise/bleed easily  Psychiatric/Behavioral: Negative for dysphoric mood and sleep disturbance  The patient is not nervous/anxious  Objective:  Vitals:    03/28/22 1313   BP: 122/82   BP Location: Left arm   Patient Position: Sitting   Cuff Size: Large   Pulse: 70   Resp: 16   Temp: (!) 97 1 °F (36 2 °C)   TempSrc: Temporal   SpO2: 97%   Weight: 102 kg (224 lb)   Height: 4' 11" (1 499 m)     Body mass index is 45 24 kg/m²  Physical Exam  Vitals and nursing note reviewed  Constitutional:       General: She is not in acute distress  Appearance: Normal appearance  She is well-developed  She is obese  HENT:      Head: Normocephalic and atraumatic  Right Ear: Tympanic membrane, ear canal and external ear normal       Left Ear: Tympanic membrane, ear canal and external ear normal       Nose: Nose normal  No congestion  Mouth/Throat:      Mouth: Mucous membranes are moist       Pharynx: Oropharynx is clear  No oropharyngeal exudate or posterior oropharyngeal erythema  Eyes:      General:         Right eye: No discharge  Left eye: No discharge  Conjunctiva/sclera: Conjunctivae normal       Pupils: Pupils are equal, round, and reactive to light  Neck:      Thyroid: No thyromegaly  Cardiovascular:      Rate and Rhythm: Normal rate and regular rhythm  Pulses: Normal pulses  Heart sounds: Normal heart sounds  No murmur heard  Pulmonary:      Effort: Pulmonary effort is normal  No respiratory distress  Breath sounds: Rhonchi present  No wheezing  Abdominal:      General: Bowel sounds are normal  There is no distension  Palpations: Abdomen is soft  There is no mass  Tenderness: There is no abdominal tenderness  Hernia: No hernia is present  Musculoskeletal:         General: No deformity  Normal range of motion  Cervical back: Normal range of motion and neck supple   No muscular tenderness  Right lower leg: No edema  Left lower leg: No edema  Lymphadenopathy:      Cervical: No cervical adenopathy  Skin:     General: Skin is warm and dry  Capillary Refill: Capillary refill takes less than 2 seconds  Findings: No erythema or rash  Neurological:      General: No focal deficit present  Mental Status: She is alert and oriented to person, place, and time  Mental status is at baseline  Cranial Nerves: No cranial nerve deficit  Sensory: No sensory deficit  Motor: No abnormal muscle tone  Psychiatric:         Mood and Affect: Mood normal          Behavior: Behavior normal          Thought Content:  Thought content normal          Judgment: Judgment normal

## 2022-03-28 NOTE — PATIENT INSTRUCTIONS

## 2022-03-28 NOTE — ASSESSMENT & PLAN NOTE
CPE done  Tdap and pneumovacc 23 given  Had COVID vaccines  Recommend Shingrix  Will check lipids and FBS  Pt advised to schedule mammogram, dexascan, and colonoscopy  Pt advised to follow a well balanced, heart healthy diet and to exercise on a regular basis  Not a smoker  BP good

## 2022-03-28 NOTE — ASSESSMENT & PLAN NOTE
Level was 24 8 in Feb 2022 and pt told to increase Vit D  Pt now alternates 5000 U and 70595 U  Recheck in 3 months

## 2022-03-28 NOTE — ASSESSMENT & PLAN NOTE
Pt has had sxs for 2 weeks and not getting better  Will start abxs and pt told to take mucinex bid  Call if persists

## 2022-05-14 ENCOUNTER — APPOINTMENT (OUTPATIENT)
Dept: LAB | Facility: HOSPITAL | Age: 67
End: 2022-05-14
Attending: FAMILY MEDICINE
Payer: COMMERCIAL

## 2022-05-14 DIAGNOSIS — E55.9 VITAMIN D DEFICIENCY: ICD-10-CM

## 2022-05-14 DIAGNOSIS — D64.9 ANEMIA, UNSPECIFIED TYPE: ICD-10-CM

## 2022-05-14 DIAGNOSIS — Z13.1 SCREENING FOR DIABETES MELLITUS: ICD-10-CM

## 2022-05-14 DIAGNOSIS — Z13.6 SCREENING FOR CARDIOVASCULAR CONDITION: ICD-10-CM

## 2022-05-14 LAB
25(OH)D3 SERPL-MCNC: 33.2 NG/ML (ref 30–100)
BASOPHILS # BLD AUTO: 0.03 THOUSANDS/ΜL (ref 0–0.1)
BASOPHILS NFR BLD AUTO: 1 % (ref 0–1)
CHOLEST SERPL-MCNC: 139 MG/DL
EOSINOPHIL # BLD AUTO: 0.13 THOUSAND/ΜL (ref 0–0.61)
EOSINOPHIL NFR BLD AUTO: 2 % (ref 0–6)
ERYTHROCYTE [DISTWIDTH] IN BLOOD BY AUTOMATED COUNT: 15.8 % (ref 11.6–15.1)
FERRITIN SERPL-MCNC: 12 NG/ML (ref 8–388)
GLUCOSE P FAST SERPL-MCNC: 116 MG/DL (ref 65–99)
HCT VFR BLD AUTO: 35 % (ref 34.8–46.1)
HDLC SERPL-MCNC: 30 MG/DL
HGB BLD-MCNC: 10.6 G/DL (ref 11.5–15.4)
IMM GRANULOCYTES # BLD AUTO: 0.01 THOUSAND/UL (ref 0–0.2)
IMM GRANULOCYTES NFR BLD AUTO: 0 % (ref 0–2)
IRON SATN MFR SERPL: 10 % (ref 15–50)
IRON SERPL-MCNC: 40 UG/DL (ref 50–170)
LDLC SERPL CALC-MCNC: 88 MG/DL (ref 0–100)
LYMPHOCYTES # BLD AUTO: 2.08 THOUSANDS/ΜL (ref 0.6–4.47)
LYMPHOCYTES NFR BLD AUTO: 36 % (ref 14–44)
MCH RBC QN AUTO: 28.4 PG (ref 26.8–34.3)
MCHC RBC AUTO-ENTMCNC: 30.3 G/DL (ref 31.4–37.4)
MCV RBC AUTO: 94 FL (ref 82–98)
MONOCYTES # BLD AUTO: 0.39 THOUSAND/ΜL (ref 0.17–1.22)
MONOCYTES NFR BLD AUTO: 7 % (ref 4–12)
NEUTROPHILS # BLD AUTO: 3.16 THOUSANDS/ΜL (ref 1.85–7.62)
NEUTS SEG NFR BLD AUTO: 54 % (ref 43–75)
NONHDLC SERPL-MCNC: 109 MG/DL
NRBC BLD AUTO-RTO: 0 /100 WBCS
PLATELET # BLD AUTO: 307 THOUSANDS/UL (ref 149–390)
PMV BLD AUTO: 10.2 FL (ref 8.9–12.7)
RBC # BLD AUTO: 3.73 MILLION/UL (ref 3.81–5.12)
TIBC SERPL-MCNC: 381 UG/DL (ref 250–450)
TRIGL SERPL-MCNC: 104 MG/DL
WBC # BLD AUTO: 5.8 THOUSAND/UL (ref 4.31–10.16)

## 2022-05-14 PROCEDURE — 80061 LIPID PANEL: CPT

## 2022-05-14 PROCEDURE — 82306 VITAMIN D 25 HYDROXY: CPT

## 2022-05-14 PROCEDURE — 82947 ASSAY GLUCOSE BLOOD QUANT: CPT

## 2022-05-14 PROCEDURE — 36415 COLL VENOUS BLD VENIPUNCTURE: CPT

## 2022-05-14 PROCEDURE — 83540 ASSAY OF IRON: CPT

## 2022-05-14 PROCEDURE — 82728 ASSAY OF FERRITIN: CPT

## 2022-05-14 PROCEDURE — 83550 IRON BINDING TEST: CPT

## 2022-05-14 PROCEDURE — 85025 COMPLETE CBC W/AUTO DIFF WBC: CPT

## 2022-05-20 ENCOUNTER — TELEPHONE (OUTPATIENT)
Dept: FAMILY MEDICINE CLINIC | Facility: CLINIC | Age: 67
End: 2022-05-20

## 2022-05-20 DIAGNOSIS — R73.9 ELEVATED BLOOD SUGAR: Primary | ICD-10-CM

## 2022-06-06 ENCOUNTER — APPOINTMENT (OUTPATIENT)
Dept: LAB | Facility: HOSPITAL | Age: 67
End: 2022-06-06
Attending: FAMILY MEDICINE
Payer: COMMERCIAL

## 2022-06-06 DIAGNOSIS — R73.9 ELEVATED BLOOD SUGAR: ICD-10-CM

## 2022-06-06 LAB
EST. AVERAGE GLUCOSE BLD GHB EST-MCNC: 154 MG/DL
HBA1C MFR BLD: 7 %

## 2022-06-06 PROCEDURE — 36415 COLL VENOUS BLD VENIPUNCTURE: CPT

## 2022-06-06 PROCEDURE — 83036 HEMOGLOBIN GLYCOSYLATED A1C: CPT

## 2022-06-06 PROCEDURE — 3051F HG A1C>EQUAL 7.0%<8.0%: CPT | Performed by: FAMILY MEDICINE

## 2022-06-07 PROBLEM — D64.9 ANEMIA: Status: RESOLVED | Noted: 2022-03-28 | Resolved: 2022-06-07

## 2022-06-07 PROBLEM — J20.9 ACUTE BRONCHITIS: Status: RESOLVED | Noted: 2022-03-28 | Resolved: 2022-06-07

## 2022-06-09 ENCOUNTER — RA CDI HCC (OUTPATIENT)
Dept: OTHER | Facility: HOSPITAL | Age: 67
End: 2022-06-09

## 2022-06-09 NOTE — PROGRESS NOTES
[facility]  coding opportunities          Chart Reviewed number of suggestions sent to Provider: 1     Patients Insurance        Commercial Insurance: Ch[employer]el 93     [id number]

## 2022-06-16 ENCOUNTER — OFFICE VISIT (OUTPATIENT)
Dept: FAMILY MEDICINE CLINIC | Facility: CLINIC | Age: 67
End: 2022-06-16
Payer: COMMERCIAL

## 2022-06-16 VITALS
WEIGHT: 225 LBS | SYSTOLIC BLOOD PRESSURE: 110 MMHG | BODY MASS INDEX: 45.36 KG/M2 | DIASTOLIC BLOOD PRESSURE: 70 MMHG | TEMPERATURE: 98.3 F | HEIGHT: 59 IN | OXYGEN SATURATION: 98 % | HEART RATE: 72 BPM | RESPIRATION RATE: 16 BRPM

## 2022-06-16 DIAGNOSIS — E11.9 DIABETES MELLITUS WITHOUT COMPLICATION (HCC): Primary | ICD-10-CM

## 2022-06-16 DIAGNOSIS — E55.9 VITAMIN D DEFICIENCY: ICD-10-CM

## 2022-06-16 DIAGNOSIS — D64.9 ANEMIA, UNSPECIFIED TYPE: ICD-10-CM

## 2022-06-16 PROCEDURE — 3008F BODY MASS INDEX DOCD: CPT | Performed by: FAMILY MEDICINE

## 2022-06-16 PROCEDURE — 99214 OFFICE O/P EST MOD 30 MIN: CPT | Performed by: FAMILY MEDICINE

## 2022-06-16 PROCEDURE — 1160F RVW MEDS BY RX/DR IN RCRD: CPT | Performed by: FAMILY MEDICINE

## 2022-06-16 PROCEDURE — 3074F SYST BP LT 130 MM HG: CPT | Performed by: FAMILY MEDICINE

## 2022-06-16 PROCEDURE — 1036F TOBACCO NON-USER: CPT | Performed by: FAMILY MEDICINE

## 2022-06-16 PROCEDURE — 3078F DIAST BP <80 MM HG: CPT | Performed by: FAMILY MEDICINE

## 2022-06-16 RX ORDER — METFORMIN HYDROCHLORIDE 500 MG/1
500 TABLET, EXTENDED RELEASE ORAL
Qty: 30 TABLET | Refills: 5 | Status: SHIPPED | OUTPATIENT
Start: 2022-06-16

## 2022-06-16 NOTE — PATIENT INSTRUCTIONS
Diabetes and Exercise   WHAT YOU NEED TO KNOW:   Physical activity, such as exercise, can help keep your blood sugar level steady or improve insulin resistance  Activity can help decrease your risk for heart disease, and help you lose weight  Exercise can also help lower your A1c  Your diabetes care team will help you create an exercise plan  The plan will be based on the type of diabetes you have and your starting fitness level  DISCHARGE INSTRUCTIONS:   Call your local emergency number (911 in the [address] [facility]) if:   You have chest pain or shortness of breath  Return to the emergency department if:   You have a low blood sugar level and it does not improve with treatment  Symptoms are trouble thinking, a pounding heartbeat, and sweating  Your blood sugar level is above 240 mg/dL and does not come down within 15 minutes of treatment  You have blurred or double vision  Your breath has a fruity, sweet smell, or your breathing is shallow  Call your doctor or diabetes care team if:   You have ketones in your blood or urine  You have a fever  Your blood sugar levels are higher than your target goals  You often have low blood sugar levels  Your skin is red, dry, warm, or swollen  You have a wound that does not heal     You have trouble coping with diabetes, or you feel anxious or depressed  You have questions or concerns about your condition or care  Tips to help you create and meet your exercise goals:   Set a goal for 150 minutes (2 5 hours) of moderate to vigorous aerobic activity each week  Aerobic activity helps your heart stay strong  Aerobic activity includes walking, bicycling, dancing, swimming, and raking leaves  Spread aerobic activity over 3 to 5 days  Do not take more than 2 days off in a row  It is best to do at least 10 minutes at a time and 30 minutes each day  You can work up to these goals  Remember that any activity is better than no activity   Over time, you can make exercise more intense or last longer  You can also add more days of exercise as your fitness level improves  Your diabetes care team can help you make a step-by-step plan to achieve your goals  Set a strength training goal of 2 to 3 times a week  Take at least 1 day off in between strength training sessions  Strength training helps you keep the muscles you have and build new muscles  Strength training includes lifting weights, climbing stairs, yoga, and petr chi          Older adults should include balance training 2 to 3 times each week  These include walking backwards, standing on one foot, and walking heel to toe in a straight line  Other healthy exercise tips:   Stretch before and after you exercise to prevent injury  Drink water or liquids that do not contain sugar before, during, and after exercise  Ask your dietitian or healthcare provider which liquids you should drink when you exercise  Do not sit for longer than 30 minutes at a time during your day  If you cannot walk around, at least stand up  This will help you stay active and keep your blood circulating  Exercise and blood sugar levels:  Check your blood sugar level before and after exercise, if you use insulin  Healthcare providers may tell you to change the amount of insulin you take or food you eat  If your blood sugar level is high, check your blood or urine for ketones before you exercise  Do not exercise if your blood sugar level is high and you have ketones  If your blood sugar level is less than 100 mg/dL, have a carbohydrate snack before you exercise  Examples are 4 to 6 crackers, ½ banana, 8 ounces (1 cup) of milk, or 4 ounces (½ cup) of juice  Follow up with your doctor or diabetes care team as directed:  Write down your questions so you remember to ask them during your visits    © Copyright [employer] 2022 Information is for End User's use only and may not be sold, redistributed or otherwise used for commercial purposes  All illustrations and images included in CareNotes® are the copyrighted property of A D A M , Inc  or Isai Cruz   The above information is an  only  It is not intended as medical advice for individual conditions or treatments  Talk to your doctor, nurse or pharmacist before following any medical regimen to see if it is safe and effective for you  Diabetes and Nutrition   WHAT YOU NEED TO KNOW:   Nutrition plans help with healthy eating patterns that improve health  Nutrition plans and regular exercise help keep your blood sugar levels steady  They also help delay or prevent complications of diabetes, such as diabetic kidney disease  DISCHARGE INSTRUCTIONS:   Call your local emergency number (911 in the [address] [facility]) if:   You have any of the following signs of a heart attack:      Squeezing, pressure, or pain in your chest    You may  also have any of the following:     Discomfort or pain in your back, neck, jaw, stomach, or arm    Shortness of breath    Nausea or vomiting    Lightheadedness or a sudden cold sweat      Return to the emergency department if:   You have a low blood sugar level and it does not improve with treatment  Symptoms are trouble thinking, a pounding heartbeat, and sweating  Your blood sugar level is above 240 mg/dL and does not come down within 15 minutes of treatment  You have ketones in your blood or urine  You have nausea or are vomiting and cannot keep any food or liquid down  You have blurred or double vision  Your breath has a fruity, sweet smell, or your breathing is shallow  Call your doctor or diabetes care team if:   Your blood sugar levels are higher than your target goals  You often have low blood sugar levels  You have trouble coping with diabetes, or you feel anxious or depressed  You have questions or concerns about your condition or care      A dietitian will help you create a nutrition plan  to meet your needs and your family's needs  The goal is for you to reach or maintain healthy weight, blood sugar, blood pressure, and lipid levels  You should meet with the dietitian at least 1 time each year  You will learn the following: How food affects your blood sugar levels    How to create healthy eating habits    How to make food choices based on your activity level, weight, and glucose levels    How your favorite foods may fit into your plan    How to keep track of carbohydrates    Correct portion sizes for each food    Changes you can make to your plan if you get pregnant or are breastfeeding    Do not skip meals: The goal is to keep your blood sugar level steady  Blood sugar levels may drop too low if you have received insulin and do not eat  Eat more high-fiber foods:  High-fiber foods include fresh or frozen fruits and vegetables, whole-grain breads, and beans  Fiber helps control or lower blood sugar and cholesterol levels  Choose whole fruits instead of fruit juice as much as possible  Sugar may be added to juice, and fiber may be removed  Choose heart-healthy fats:  Foods high in heart-healthy fats include olive oil, nuts, avocados, and fatty fish, such as salmon and tuna  Foods high in unhealthy fats include red meat, full-fat dairy products, and soft margarine  Unhealthy fats can increase your risk for heart disease, increase bad cholesterol, and lower good cholesterol  Choose complex carbohydrates:  Foods with complex carbohydrates include brown rice, whole-grain breads and cereals, and cooked beans  Foods with simple carbohydrates include white bread, white rice, most cold cereals, and snack foods  Your plan will include the amount of carbohydrate to have at one time or in a day  Your blood sugar level can get too high if you eat too much carbohydrate at one time  Blood sugar levels do not spike as high or drop as quickly with complex carbohydrates as with simple carbohydrates   Choose complex carbohydrates whenever possible  Have less sodium (salt): The risk for high blood pressure (BP) increases with high-sodium foods  Limit high-sodium foods, such as soy sauce, potato chips, and canned soup  Do not add salt to food you cook  Limit your use of table salt  Read labels to have no more than 2,300 milligrams of sodium in one day  Limit artificial sweeteners: These may be found in food or drinks, such as diet soft drinks or other low-calorie beverages  Artificial sweeteners are low in calories  They may help you lower your overall calories and carbohydrates  It is important not to have more calories from other foods to make up for the calories saved  Artificial sweeteners do not have any nutrition  Eat whole foods and drink water as much as possible  Your plan may include beverages with artificial sweeteners for a short time  These can help you transition from high-sugar beverages to water  Use the plate method for each meal:  This method can help you eat the right amount of carbohydrates and keep your blood sugar levels under control  Draw an imaginary line down the middle of a 9-inch dinner plate  On one side, draw another line to divide that section in half  Your plate will have one large section and 2 small sections  Fill the largest section with non-starchy vegetables  These include broccoli, spinach, cucumbers, peppers, cauliflower, and tomatoes  Add a starch to one of the small sections  Starches include pasta, rice, whole-grain bread, tortillas, corn, potatoes, and beans  Add meat or another source of protein to the other small section  Examples include chicken or turkey without skin, fish, lean beef or pork, low-fat cheese, tofu, and eggs  Add dairy products or fruit next to your plate if your meal plan allows  Examples of dairy include skim or 1% milk and low-fat yogurt   If you do not drink milk or eat dairy products, you may be able to add another serving of starchy food instead  Have a low-calorie or calorie-free drink with your meal  Examples include water or unsweetened tea or coffee  Know the risks if you choose to drink alcohol:  Alcohol can cause hypoglycemia (low blood sugar level), especially if you use insulin  Alcohol can cause high blood sugar and BP levels, and weight gain if you drink too much  Women 21 years or older and men 72 years or older should limit alcohol to 1 drink a day  Men aged 24 to 59 years should limit alcohol to 2 drinks a day  A drink of alcohol is 12 ounces of beer, 5 ounces of wine, or 1½ ounces of liquor  Hypoglycemia can happen hours after you drink alcohol  Check your blood sugar level for several hours after you drink alcohol  Have a source of fast-acting carbohydrates with you in case your level goes too low  You need immediate care if you have signs or symptoms of hypoglycemia, such as sweating, confusion, or fainting  Maintain a healthy weight:  A healthy weight can help you control your diabetes  You can maintain a healthy weight with a nutrition plan and exercise  Ask your healthcare provider how much you should weigh  Ask him or her to help you create a weight loss plan if you are overweight  Together you can set weight loss and maintenance goals  Follow up with your doctor or diabetes team as directed:  Write down your questions so you remember to ask them during your visits  © Copyright [employer] 2022 Information is for End User's use only and may not be sold, redistributed or otherwise used for commercial purposes  All illustrations and images included in CareNotes® are the copyrighted property of A D A M , Inc  or [facility]   The above information is an  only  It is not intended as medical advice for individual conditions or treatments  Talk to your doctor, nurse or pharmacist before following any medical regimen to see if it is safe and effective for you

## 2022-06-16 NOTE — PROGRESS NOTES
Assessment/Plan:         Problem List Items Addressed This Visit        Endocrine    Diabetes mellitus without complication ([facility] [address] ) - Primary     A1C 7 0 in June 2022  Will start metformin  mg qd  Will check urine microalbumin  Advised pt to follow a low carb diet and to exercise on a regular basis  Pt advised to schedule eye exam  Foot exam done today  Lab Results   Component Value Date    HGBA1C 7 0 (H) 06/06/2022              Relevant Medications    metFORMIN (GLUCOPHAGE-XR) 500 mg 24 hr tablet    Other Relevant Orders    Microalbumin / creatinine urine ratio    Basic metabolic panel    Hemoglobin A1C       Other    Vitamin D deficiency     Level up to 33 2 in May 2022 and pt told to increase Vit D  Pt now alternates 5000 U and 09872 U  Relevant Orders    Vitamin D 25 hydroxy    Anemia     Hgb 10 6 and ferritin 12 in May 2022  Relevant Orders    CBC and differential            Subjective:      Patient ID: Ivana Tyler is a 78 y o  female  Pt here for new onset of diabetes  Had A1C recently and was 7 0  Has FH of diabetes  Pt has noticed increased urination and waking up during the night to urinate  Feels tired and thirsty  No recent eye exam  No tingling of feet  No regular exercise  Had COVID vaccines  The following portions of the patient's history were reviewed and updated as appropriate:   Past Medical History:  She has a past medical history of Fibromyalgia and Hyperlipidemia ,  _______________________________________________________________________  Medical Problems:  does not have any pertinent problems on file ,  _______________________________________________________________________  Past Surgical History:   has a past surgical history that includes Gastric bypass; Mediastinoscopy; Cholecystectomy; and Mammo (historical) (05/2012)  ,  _______________________________________________________________________  Family History:  family history includes COPD in her mother; Diabetes in her father, maternal aunt, and mother; Heart disease in her father; Hypertension in her father, maternal grandmother, and mother; Stroke in her father ,  _______________________________________________________________________  Social History:   reports that she has never smoked  She has never used smokeless tobacco  She reports previous alcohol use  She reports previous drug use ,  _______________________________________________________________________  Allergies:  has No Known Allergies     _______________________________________________________________________  Current Outpatient Medications   Medication Sig Dispense Refill    Cholecalciferol (Vitamin D3) 125 MCG (5000 UT) TABS Take 5,000 Units by mouth every other day Alternating 5000 and 06667 every other day      cyclobenzaprine (FLEXERIL) 5 mg tablet Take 1 tablet (5 mg total) by mouth 3 (three) times a day as needed for muscle spasms 30 tablet 5    Ferrous Sulfate (SLOW FE PO) Take by mouth 2 (two) times a day      metFORMIN (GLUCOPHAGE-XR) 500 mg 24 hr tablet Take 1 tablet (500 mg total) by mouth daily with dinner 30 tablet 5    multivitamin (THERAGRAN) TABS Take 1 tablet by mouth daily      naproxen (NAPROSYN) 500 mg tablet Take 1 tab as needed every 12 hrs 30 tablet 5     No current facility-administered medications for this visit      _______________________________________________________________________  Review of Systems   Constitutional: Positive for fatigue  Eyes: Negative for visual disturbance  Respiratory: Negative for cough and shortness of breath  Cardiovascular: Negative for chest pain  Gastrointestinal: Negative for abdominal pain, constipation, diarrhea, nausea and vomiting  Endocrine: Positive for polydipsia and polyuria  Genitourinary: Negative for difficulty urinating  Musculoskeletal: Negative for arthralgias and gait problem  Skin: Negative for wound     Neurological: Negative for dizziness, numbness and headaches  Objective:  Vitals:    06/16/22 1054   BP: 110/70   BP Location: Left arm   Patient Position: Sitting   Cuff Size: Large   Pulse: 72   Resp: 16   Temp: 98 3 °F (36 8 °C)   TempSrc: Temporal   SpO2: 98%   Weight: 102 kg (225 lb)   Height: 4' 11" (1 499 m)     Body mass index is 45 44 kg/m²  Physical Exam  Vitals and nursing note reviewed  Constitutional:       Appearance: Normal appearance  She is well-developed  She is obese  HENT:      Head: Normocephalic and atraumatic  Cardiovascular:      Rate and Rhythm: Normal rate and regular rhythm  Pulses: no weak pulses     Heart sounds: Normal heart sounds  No murmur heard  Pulmonary:      Effort: Pulmonary effort is normal  No respiratory distress  Breath sounds: Normal breath sounds  No wheezing  Musculoskeletal:      Cervical back: Normal range of motion and neck supple  Right lower leg: No edema  Left lower leg: No edema  Feet:      Right foot:      Skin integrity: Dry skin present  No ulcer, skin breakdown, erythema, warmth or callus  Left foot:      Skin integrity: Dry skin present  No ulcer, skin breakdown, erythema, warmth or callus  Lymphadenopathy:      Cervical: No cervical adenopathy  Neurological:      Mental Status: She is alert and oriented to person, place, and time  Psychiatric:         Mood and Affect: Mood normal          Behavior: Behavior normal          Thought Content: Thought content normal          Judgment: Judgment normal        Patient's shoes and socks removed  Right Foot/Ankle   Right Foot Inspection  Skin Exam: skin normal, skin intact and dry skin  No warmth, no callus, no erythema, no maceration, no abnormal color, no pre-ulcer, no ulcer and no callus  Sensory   Proprioception: intact      Vascular  Capillary refills: < 3 seconds          Left Foot/Ankle  Left Foot Inspection  Skin Exam: skin normal, skin intact and dry skin   No warmth, no erythema, no maceration, normal color, no pre-ulcer, no ulcer and no callus       Sensory   Proprioception: intact      Vascular  Capillary refills: < 3 seconds        Assign Risk Category  No deformity present  No loss of protective sensation  No weak pulses  Risk: 0

## 2022-06-16 NOTE — ASSESSMENT & PLAN NOTE
A1C 7 0 in June 2022  Will start metformin  mg qd  Will check urine microalbumin  Advised pt to follow a low carb diet and to exercise on a regular basis  Pt advised to schedule eye exam  Foot exam done today      Lab Results   Component Value Date    HGBA1C 7 0 (H) 06/06/2022

## 2022-06-23 ENCOUNTER — TELEPHONE (OUTPATIENT)
Dept: FAMILY MEDICINE CLINIC | Facility: CLINIC | Age: 67
End: 2022-06-23

## 2022-06-23 DIAGNOSIS — E11.9 DIABETES MELLITUS WITHOUT COMPLICATION (HCC): Primary | ICD-10-CM

## 2022-06-23 NOTE — TELEPHONE ENCOUNTER
Patient called for a referral for diabetes educator  She is allowed 3 visits with them through her insurance   Please call when complete

## 2022-06-30 ENCOUNTER — OFFICE VISIT (OUTPATIENT)
Dept: DIABETES SERVICES | Facility: CLINIC | Age: 67
End: 2022-06-30
Payer: COMMERCIAL

## 2022-06-30 VITALS — BODY MASS INDEX: 45.48 KG/M2 | WEIGHT: 225.2 LBS

## 2022-06-30 DIAGNOSIS — E11.9 DIABETES MELLITUS WITHOUT COMPLICATION (HCC): Primary | ICD-10-CM

## 2022-06-30 PROCEDURE — 97802 MEDICAL NUTRITION INDIV IN: CPT

## 2022-06-30 NOTE — PROGRESS NOTES
Medical Nutrition Therapy        Assessment    Visit Type: Initial visit  Chief complaint/Medical Diagnosis/reason for visit E11 9 Diabetes Mellitus without complication  HPI Josesito Garcia was seen today for her initial MNT visit  She is retired and leads a very sedentary lifestyle  Josesito Garcia wasn't confident about her food options and wanted to gain nutritional education regarding her condition  Patient interested in knowing various healthy food options for her to better control her BG levels  She already cut down on high sodium foods and on her sweetened beverages  Problems identified in food recall include inconsistent carbohydrates  Provided patient with a 1300 calorie meal plan to assist with consistency, balance and portion control  Encouraged the consumption of regular meals at regular times  Advised patient to keep carbohydrate intake to 30-45 grams per meal and 15 grams HS snack to assist with glycemic control  Suggested keeping protein intake to 5 ounces a day and fat to 3 servings daily to assist with lipid management and calorie control  Portion booklet and food labels were used to teach basic carbohydrate counting  Patient agreed to keep daily food logs and return them in one week for assessment  Patient will do a follow-up in 6 weeks  RD will remain available for further dietary questions/concerns       Ht Readings from Last 1 Encounters:   06/16/22 4' 11" (1 499 m)     Wt Readings from Last 3 Encounters:   06/30/22 102 kg (225 lb 3 2 oz)   06/16/22 102 kg (225 lb)   03/28/22 102 kg (224 lb)     Weight Change: No    Barriers to Learning: no barriers    Do you follow any special diet presently?: No  Who shops: patient  Who cooks: patient    Food Log: Completed via the method of food recall    Breakfast 9:30-10:00: 1 cup coffee with 1 tbsp creamer, 3 sausage links, 1 egg   Morning Snack: none  Lunch 1:00-2:00:  cold cut chicken 2 oz, 2 rye toast, water OR 1 tortilla, grilled chicken 3 oz, 1 slice tomato, 1 slice onion, lettuce, 1 tsp oil and 2 tsp vinegar, salt and pepper, water    Afternoon Snack: popsicle   Dinner 6:30:chicken, fish, salmon, shrimps, hamburger meat 3-4 oz, string beans,beets, 1/3 cup green peas, 1 baked potato with 2 tbsp sour cream and chives, 7oz diet pepsi  Evening Snack:none  Beverages: water, coffee with creamer, diet soda  Eating out/Take out: once / month - patel, lettuce, tomato on a whole wheat toasted, water  Exercise not beyond daily activities    Calorie needs 1300 kcals/day Carbs: 30-45 g/meal, 15g HSsnack     Fat: 3 servings/day    Protein: 5 ounces/day    Nutrition Diagnosis:  Food and nutrition related knowledge deficit  related to Lack of prior exposure to accurate nutrition related information as evidenced by No prior knowledge of need for food and nutrition related recommendations    Intervention: reduced fat intake, increased fiber intake, label reading, behavior modification strategies, carbohydrate counting and meal timing     Treatment Goals: Patient will monitor fat intake, Patient will consume 3 meals a day, Patient will monitor portion control, Patient will consume 25-35 grams of fiber a day, Patient will count carbohydrates and Patient will monitor blood glucose    Monitoring and evaluation:    Term code indicator  FH 1 3 2 Food Intake Criteria: Consume 3 meals a day, 4-5 hours apart  Try not to skip any meals  Term code indicator  FH 1 6 3 Carbohydrate Intake Criteria: Take 30-45 grams of carbohydrates per meal and 15 grams of carbohydrates in post dinner snack  Term code indicator  FH 1 6 4 Fiber Intake Criteria: Include more non starchy vegetables and at least 2 whole fruits a day      Materials Provided: portion booklet, food record log     Patients Response to Instruction:  Comprehensiongood  Motivationgood  Expected Compliancegood    Start- Stop: 2:00-3:15  Total Minutes: 75 Minutes  Group or Individual Instruction: MNT-I  Other: Cate Emery        Thank you for coming to the [facility] for education today  Please feel free to call with any questions or concerns      Waldo Mcgowan  [address] [place] [address]  [address] [id number]

## 2022-06-30 NOTE — PATIENT INSTRUCTIONS
Consume 3 meals a day, 4-5 hours apart  Try not to skip any meals  Take 30-45 grams of carbohydrates per meal and 15 grams of carbohydrates in post dinner snack  Include more non starchy vegetables and at least 2 whole fruits a day

## 2022-07-02 ENCOUNTER — HOSPITAL ENCOUNTER (OUTPATIENT)
Dept: RADIOLOGY | Age: 67
Discharge: HOME | End: 2022-07-02
Payer: COMMERCIAL

## 2022-07-02 DIAGNOSIS — Z13.820 ENCOUNTER FOR OSTEOPOROSIS SCREENING IN ASYMPTOMATIC POSTMENOPAUSAL PATIENT: ICD-10-CM

## 2022-07-02 DIAGNOSIS — Z78.0 ENCOUNTER FOR OSTEOPOROSIS SCREENING IN ASYMPTOMATIC POSTMENOPAUSAL PATIENT: ICD-10-CM

## 2022-07-02 PROCEDURE — 77080 DXA BONE DENSITY AXIAL: CPT

## 2022-07-03 PROBLEM — M85.80 OSTEOPENIA: Status: ACTIVE | Noted: 2022-07-03

## 2022-07-19 ENCOUNTER — TELEPHONE (OUTPATIENT)
Dept: FAMILY MEDICINE CLINIC | Facility: CLINIC | Age: 67
End: 2022-07-19

## 2022-07-19 NOTE — TELEPHONE ENCOUNTER
Patient states she was instructed to take 1,200 mg of calcium per her bone scan results  Patient stated the pill for the 1,200 mg is too big and it gets stuck in her throat   Is there anything smaller she can take

## 2022-08-25 ENCOUNTER — HOSPITAL ENCOUNTER (OUTPATIENT)
Dept: MAMMOGRAPHY | Facility: HOSPITAL | Age: 67
Discharge: HOME | End: 2022-08-25
Attending: FAMILY MEDICINE
Payer: COMMERCIAL

## 2022-08-25 VITALS — WEIGHT: 224.87 LBS | HEIGHT: 59 IN | BODY MASS INDEX: 45.33 KG/M2

## 2022-08-25 DIAGNOSIS — Z12.31 ENCOUNTER FOR SCREENING MAMMOGRAM FOR BREAST CANCER: ICD-10-CM

## 2022-08-25 PROCEDURE — 77067 SCR MAMMO BI INCL CAD: CPT

## 2022-10-10 ENCOUNTER — APPOINTMENT (OUTPATIENT)
Dept: LAB | Facility: HOSPITAL | Age: 67
End: 2022-10-10
Attending: FAMILY MEDICINE
Payer: COMMERCIAL

## 2022-10-10 DIAGNOSIS — E11.9 DIABETES MELLITUS WITHOUT COMPLICATION (HCC): ICD-10-CM

## 2022-10-10 DIAGNOSIS — E55.9 VITAMIN D DEFICIENCY: ICD-10-CM

## 2022-10-10 DIAGNOSIS — D64.9 ANEMIA, UNSPECIFIED TYPE: ICD-10-CM

## 2022-10-10 LAB
25(OH)D3 SERPL-MCNC: 46.1 NG/ML (ref 30–100)
ANION GAP SERPL CALCULATED.3IONS-SCNC: 8 MMOL/L (ref 4–13)
BASOPHILS # BLD AUTO: 0.03 THOUSANDS/ΜL (ref 0–0.1)
BASOPHILS NFR BLD AUTO: 1 % (ref 0–1)
BUN SERPL-MCNC: 13 MG/DL (ref 5–25)
CALCIUM SERPL-MCNC: 8.9 MG/DL (ref 8.4–10.2)
CHLORIDE SERPL-SCNC: 102 MMOL/L (ref 96–108)
CO2 SERPL-SCNC: 27 MMOL/L (ref 21–32)
CREAT SERPL-MCNC: 0.9 MG/DL (ref 0.6–1.3)
EOSINOPHIL # BLD AUTO: 0.13 THOUSAND/ΜL (ref 0–0.61)
EOSINOPHIL NFR BLD AUTO: 2 % (ref 0–6)
ERYTHROCYTE [DISTWIDTH] IN BLOOD BY AUTOMATED COUNT: 14.7 % (ref 11.6–15.1)
EST. AVERAGE GLUCOSE BLD GHB EST-MCNC: 154 MG/DL
GFR SERPL CREATININE-BSD FRML MDRD: 66 ML/MIN/1.73SQ M
GLUCOSE P FAST SERPL-MCNC: 110 MG/DL (ref 65–99)
HBA1C MFR BLD: 7 %
HCT VFR BLD AUTO: 33.1 % (ref 34.8–46.1)
HGB BLD-MCNC: 10.2 G/DL (ref 11.5–15.4)
IMM GRANULOCYTES # BLD AUTO: 0.01 THOUSAND/UL (ref 0–0.2)
IMM GRANULOCYTES NFR BLD AUTO: 0 % (ref 0–2)
LYMPHOCYTES # BLD AUTO: 2.05 THOUSANDS/ΜL (ref 0.6–4.47)
LYMPHOCYTES NFR BLD AUTO: 32 % (ref 14–44)
MCH RBC QN AUTO: 27.9 PG (ref 26.8–34.3)
MCHC RBC AUTO-ENTMCNC: 30.8 G/DL (ref 31.4–37.4)
MCV RBC AUTO: 90 FL (ref 82–98)
MONOCYTES # BLD AUTO: 0.34 THOUSAND/ΜL (ref 0.17–1.22)
MONOCYTES NFR BLD AUTO: 5 % (ref 4–12)
NEUTROPHILS # BLD AUTO: 3.93 THOUSANDS/ΜL (ref 1.85–7.62)
NEUTS SEG NFR BLD AUTO: 60 % (ref 43–75)
NRBC BLD AUTO-RTO: 0 /100 WBCS
PLATELET # BLD AUTO: 290 THOUSANDS/UL (ref 149–390)
PMV BLD AUTO: 11.1 FL (ref 8.9–12.7)
POTASSIUM SERPL-SCNC: 3.8 MMOL/L (ref 3.5–5.3)
RBC # BLD AUTO: 3.66 MILLION/UL (ref 3.81–5.12)
SODIUM SERPL-SCNC: 137 MMOL/L (ref 135–147)
WBC # BLD AUTO: 6.49 THOUSAND/UL (ref 4.31–10.16)

## 2022-10-10 PROCEDURE — 36415 COLL VENOUS BLD VENIPUNCTURE: CPT

## 2022-10-10 PROCEDURE — 80048 BASIC METABOLIC PNL TOTAL CA: CPT

## 2022-10-10 PROCEDURE — 83036 HEMOGLOBIN GLYCOSYLATED A1C: CPT

## 2022-10-10 PROCEDURE — 82306 VITAMIN D 25 HYDROXY: CPT

## 2022-10-10 PROCEDURE — 85025 COMPLETE CBC W/AUTO DIFF WBC: CPT

## 2022-10-11 ENCOUNTER — APPOINTMENT (OUTPATIENT)
Dept: LAB | Facility: HOSPITAL | Age: 67
End: 2022-10-11
Attending: FAMILY MEDICINE
Payer: COMMERCIAL

## 2022-10-11 LAB
CREAT UR-MCNC: 82.9 MG/DL
MICROALBUMIN UR-MCNC: <5 MG/L (ref 0–20)
MICROALBUMIN/CREAT 24H UR: <6 MG/G CREATININE (ref 0–30)

## 2022-10-13 ENCOUNTER — OFFICE VISIT (OUTPATIENT)
Dept: FAMILY MEDICINE CLINIC | Facility: CLINIC | Age: 67
End: 2022-10-13
Payer: COMMERCIAL

## 2022-10-13 VITALS
WEIGHT: 223 LBS | HEART RATE: 70 BPM | HEIGHT: 59 IN | SYSTOLIC BLOOD PRESSURE: 110 MMHG | OXYGEN SATURATION: 98 % | BODY MASS INDEX: 44.96 KG/M2 | RESPIRATION RATE: 16 BRPM | DIASTOLIC BLOOD PRESSURE: 80 MMHG | TEMPERATURE: 97.9 F

## 2022-10-13 DIAGNOSIS — E55.9 VITAMIN D DEFICIENCY: ICD-10-CM

## 2022-10-13 DIAGNOSIS — D64.9 ANEMIA, UNSPECIFIED TYPE: ICD-10-CM

## 2022-10-13 DIAGNOSIS — E11.9 DIABETES MELLITUS WITHOUT COMPLICATION (HCC): Primary | ICD-10-CM

## 2022-10-13 DIAGNOSIS — E78.00 HYPERCHOLESTEROLEMIA: ICD-10-CM

## 2022-10-13 DIAGNOSIS — M85.80 OSTEOPENIA, UNSPECIFIED LOCATION: ICD-10-CM

## 2022-10-13 DIAGNOSIS — R52 PAIN: ICD-10-CM

## 2022-10-13 PROCEDURE — 99214 OFFICE O/P EST MOD 30 MIN: CPT | Performed by: FAMILY MEDICINE

## 2022-10-13 RX ORDER — NAPROXEN 500 MG/1
TABLET ORAL
Qty: 30 TABLET | Refills: 5 | Status: SHIPPED | OUTPATIENT
Start: 2022-10-13

## 2022-10-13 RX ORDER — CYCLOBENZAPRINE HCL 5 MG
5 TABLET ORAL 3 TIMES DAILY PRN
Qty: 30 TABLET | Refills: 5 | Status: SHIPPED | OUTPATIENT
Start: 2022-10-13

## 2022-10-13 RX ORDER — METFORMIN HYDROCHLORIDE 500 MG/1
500 TABLET, EXTENDED RELEASE ORAL 2 TIMES DAILY WITH MEALS
Qty: 60 TABLET | Refills: 5 | Status: SHIPPED | OUTPATIENT
Start: 2022-10-13

## 2022-10-13 NOTE — ASSESSMENT & PLAN NOTE
Had dexascan in July 2022  T-score was -2 3 in lumbar spine and left femoral neck  Pt advised to take calcium 1200 mg qd and Vit D 1000 U qd  Pt also advised to perform weight-bearing exercise on a regular basis  Recheck dexascan in July 2024

## 2022-10-13 NOTE — PROGRESS NOTES
Depression Screening and Follow-up Plan: Patient was screened for depression during today's encounter  They screened negative with a PHQ-2 score of 0  Assessment/Plan:         Problem List Items Addressed This Visit        Endocrine    Diabetes mellitus without complication ([facility] [address] ) - Primary     A1C 7 0 in Oct 2022  Will increase metformin ER to 500 mg bid  Urine microalbumin negative in Oct 2022  Advised pt to follow a low carb diet and to exercise on a regular basis  Pt had eye exam in June 2022 by Dr Richmond Tucker  Foot exam done in June 2022  Lab Results   Component Value Date    HGBA1C 7 0 (H) 10/10/2022            Relevant Medications    metFORMIN (GLUCOPHAGE-XR) 500 mg 24 hr tablet       Musculoskeletal and Integument    Osteopenia     Had dexascan in July 2022  T-score was -2 3 in lumbar spine and left femoral neck  Pt advised to take calcium 1200 mg qd and Vit D 1000 U qd  Pt also advised to perform weight-bearing exercise on a regular basis  Recheck dexascan in July 2024  Other    Vitamin D deficiency     Level up to 46 1 in Oct 2022  Pt can lower to 5000 U qd         Hypercholesterolemia     LDL 88 in May 2022  Advised pt to follow a low cholesterol diet and to exercise on a regular basis  Anemia     Hgb 10 2 in Oct 2022  Continue Fe pill bid  Other Visit Diagnoses     Pain        Relevant Medications    naproxen (NAPROSYN) 500 mg tablet    cyclobenzaprine (FLEXERIL) 5 mg tablet            Subjective:      Patient ID: Chelsea Cunha is a 78 y o  female  Pt here for f/u DM, HL, Anemia, Osteopenia, Vit D def  Pt doing ok  No cp/sob  No headaches  No abd pain  Diabetes  Pertinent negatives for hypoglycemia include no dizziness or headaches  Pertinent negatives for diabetes include no chest pain, no fatigue, no polydipsia and no polyuria  Anemia  There has been no abdominal pain or palpitations         The following portions of the patient's history were reviewed and updated as appropriate:   Past Medical History:  She has a past medical history of Fibromyalgia and Hyperlipidemia ,  _______________________________________________________________________  Medical Problems:  does not have any pertinent problems on file ,  _______________________________________________________________________  Past Surgical History:   has a past surgical history that includes Gastric bypass; Mediastinoscopy; Cholecystectomy; and Mammo (historical) (05/2012)  ,  _______________________________________________________________________  Family History:  family history includes COPD in her mother; Diabetes in her father, maternal aunt, and mother; Heart disease in her father; Hypertension in her father, maternal grandmother, and mother; Lung cancer (age of onset: 39) in her other; Stroke in her father ,  _______________________________________________________________________  Social History:   reports that she has never smoked  She has never used smokeless tobacco  She reports previous alcohol use  She reports previous drug use ,  _______________________________________________________________________  Allergies:  has No Known Allergies     _______________________________________________________________________  Current Outpatient Medications   Medication Sig Dispense Refill   • Cholecalciferol (Vitamin D3) 125 MCG (5000 UT) TABS Take 5,000 Units by mouth every other day Alternating 5000 and 84280 every other day     • cyclobenzaprine (FLEXERIL) 5 mg tablet Take 1 tablet (5 mg total) by mouth 3 (three) times a day as needed for muscle spasms 30 tablet 5   • Ferrous Sulfate (SLOW FE PO) Take by mouth 2 (two) times a day     • metFORMIN (GLUCOPHAGE-XR) 500 mg 24 hr tablet Take 1 tablet (500 mg total) by mouth 2 (two) times a day with meals 60 tablet 5   • multivitamin (THERAGRAN) TABS Take 1 tablet by mouth daily     • naproxen (NAPROSYN) 500 mg tablet Take 1 tab as needed every 12 hrs 30 tablet 5     No current facility-administered medications for this visit      _______________________________________________________________________  Review of Systems   Constitutional: Negative for fatigue and unexpected weight change  Eyes: Negative for visual disturbance  Respiratory: Negative for cough, chest tightness and shortness of breath  Cardiovascular: Negative for chest pain and palpitations  Gastrointestinal: Negative for abdominal pain, constipation, diarrhea, nausea and vomiting  Endocrine: Negative for polydipsia and polyuria  Genitourinary: Negative for difficulty urinating  Musculoskeletal: Negative for arthralgias and gait problem  Skin: Negative for rash and wound  Neurological: Negative for dizziness, numbness and headaches  Objective:  Vitals:    10/13/22 1340   BP: 110/80   BP Location: Left arm   Patient Position: Sitting   Cuff Size: Large   Pulse: 70   Resp: 16   Temp: 97 9 °F (36 6 °C)   TempSrc: Temporal   SpO2: 98%   Weight: 101 kg (223 lb)   Height: 4' 11" (1 499 m)     Body mass index is 45 04 kg/m²  Physical Exam  Vitals and nursing note reviewed  Constitutional:       Appearance: Normal appearance  She is well-developed  She is obese  HENT:      Head: Normocephalic and atraumatic  Cardiovascular:      Rate and Rhythm: Normal rate and regular rhythm  Heart sounds: Normal heart sounds  No murmur heard  Pulmonary:      Effort: Pulmonary effort is normal  No respiratory distress  Breath sounds: Normal breath sounds  No wheezing  Musculoskeletal:      Cervical back: Normal range of motion and neck supple  Right lower leg: No edema  Left lower leg: No edema  Lymphadenopathy:      Cervical: No cervical adenopathy  Neurological:      Mental Status: She is alert and oriented to person, place, and time  Psychiatric:         Mood and Affect: Mood normal          Behavior: Behavior normal          Thought Content:  Thought content normal  Judgment: Judgment normal

## 2022-10-13 NOTE — ASSESSMENT & PLAN NOTE
A1C 7 0 in Oct 2022  Will increase metformin ER to 500 mg bid  Urine microalbumin negative in Oct 2022  Advised pt to follow a low carb diet and to exercise on a regular basis  Pt had eye exam in June 2022 by Dr Jorge Newell  Foot exam done in June 2022      Lab Results   Component Value Date    HGBA1C 7 0 (H) 10/10/2022

## 2022-11-17 ENCOUNTER — TELEPHONE (OUTPATIENT)
Dept: FAMILY MEDICINE CLINIC | Facility: CLINIC | Age: 67
End: 2022-11-17

## 2022-11-17 NOTE — TELEPHONE ENCOUNTER
Patient called requesting a Glucometer to test her BS - wants to keep on top of them    Please send to [facility] on [address]      Please call patient [phone]

## 2022-11-18 DIAGNOSIS — E11.9 DIABETES MELLITUS WITHOUT COMPLICATION (HCC): Primary | ICD-10-CM

## 2022-11-18 NOTE — TELEPHONE ENCOUNTER
I left message advising patient to call insurance to find out which glucometer they cover and to call us back

## 2022-12-01 ENCOUNTER — TELEPHONE (OUTPATIENT)
Dept: FAMILY MEDICINE CLINIC | Facility: CLINIC | Age: 67
End: 2022-12-01

## 2022-12-01 NOTE — TELEPHONE ENCOUNTER
Received a fax for the one Touch Ultra Mini and [facility] doesn't have that one      They would like to change the machine to   Verio Glucose Machine, and they would need a new script for that machine, and also the lancets and test strips  Patient tests 1x a day    Pharmacy - ph # [phone]

## 2022-12-02 ENCOUNTER — TELEPHONE (OUTPATIENT)
Dept: FAMILY MEDICINE CLINIC | Facility: CLINIC | Age: 67
End: 2022-12-02

## 2022-12-02 DIAGNOSIS — E11.9 DIABETES MELLITUS WITHOUT COMPLICATION (HCC): Primary | ICD-10-CM

## 2022-12-02 RX ORDER — LANCETS
EACH MISCELLANEOUS
Qty: 100 EACH | Refills: 5 | Status: SHIPPED | OUTPATIENT
Start: 2022-12-02

## 2022-12-02 NOTE — TELEPHONE ENCOUNTER
Patient called says the script needs to be sent to [facility] for the machine    They already received the other two for lancets and strips, but not the meter

## 2022-12-02 NOTE — TELEPHONE ENCOUNTER
[facility] doesn't take her insurance,  Can the glucose machine, and supplies be sent to back to: [facility]   [facility]    Patient ph # [phone]

## 2022-12-02 NOTE — TELEPHONE ENCOUNTER
Duplicate message - per devon in other message [facility] does not take pts insurance and rx needs to be sent back to [facility]

## 2023-02-02 ENCOUNTER — RA CDI HCC (OUTPATIENT)
Dept: OTHER | Facility: HOSPITAL | Age: 68
End: 2023-02-02

## 2023-02-02 NOTE — PROGRESS NOTES
[facility]  coding opportunities          Chart Reviewed number of suggestions sent to Provider: 2     Patients Insurance        Commercial Insurance: [facility]  [id number]

## 2023-02-14 NOTE — ASSESSMENT & PLAN NOTE
SIRS possibly attributed by acute viral syndrome  -present with fever, dry cough, headache, sinus pressure, nasal drip  -COVID was negative, influenza negative   -CXR - no evidence of acute cardiopulmonary process  -continue supportive management English

## 2023-03-30 ENCOUNTER — TELEPHONE (OUTPATIENT)
Dept: FAMILY MEDICINE CLINIC | Facility: CLINIC | Age: 68
End: 2023-03-30

## 2023-03-30 DIAGNOSIS — E11.9 DIABETES MELLITUS WITHOUT COMPLICATION (HCC): Primary | ICD-10-CM

## 2023-03-30 DIAGNOSIS — Z13.6 SCREENING FOR CARDIOVASCULAR CONDITION: ICD-10-CM

## 2023-03-30 DIAGNOSIS — D64.9 ANEMIA, UNSPECIFIED TYPE: ICD-10-CM

## 2023-03-30 NOTE — TELEPHONE ENCOUNTER
Lab orders in system  Patient advised   She is coming back home and Monday and will do her bw next week

## 2023-03-30 NOTE — TELEPHONE ENCOUNTER
Cindy Huffman had to cx PHYSICAL today at 2, because she's in [facility] (her sister was in a car accident & she's helping her out)  She DID want to ask you if she could get labs ordered  She's under a LOT of stress (in [place]), and feels she should have labs done for the next visit with you

## 2023-05-24 DIAGNOSIS — E11.9 DIABETES MELLITUS WITHOUT COMPLICATION (HCC): ICD-10-CM

## 2023-05-24 DIAGNOSIS — R52 PAIN: ICD-10-CM

## 2023-05-24 RX ORDER — NAPROXEN 500 MG/1
TABLET ORAL
Qty: 30 TABLET | Refills: 5 | Status: SHIPPED | OUTPATIENT
Start: 2023-05-24

## 2023-05-24 RX ORDER — METFORMIN HYDROCHLORIDE 500 MG/1
500 TABLET, EXTENDED RELEASE ORAL 2 TIMES DAILY WITH MEALS
Qty: 60 TABLET | Refills: 5 | Status: SHIPPED | OUTPATIENT
Start: 2023-05-24

## 2023-05-24 RX ORDER — CYCLOBENZAPRINE HCL 5 MG
5 TABLET ORAL 3 TIMES DAILY PRN
Qty: 30 TABLET | Refills: 5 | Status: SHIPPED | OUTPATIENT
Start: 2023-05-24

## 2023-05-24 NOTE — TELEPHONE ENCOUNTER
cyclobenzaprine (FLEXERIL) 5 mg tablet  naproxen (NAPROSYN) 500 mg tablet  metFORMIN (GLUCOPHAGE-XR) 500 mg 24 hr tablet    Aware due for an appt, was in [place] taking care of sister    Garry Blanco phone  [phone]

## 2023-09-18 ENCOUNTER — TELEPHONE (OUTPATIENT)
Dept: OTHER | Facility: HOSPITAL | Age: 68
End: 2023-09-18

## 2023-09-18 NOTE — TELEPHONE ENCOUNTER
TERI with patient's son. Her phone number is not in service. Hopefully her son or the patient herself will call us give us her correct phone number.

## 2023-09-18 NOTE — TELEPHONE ENCOUNTER
Patient called she needs a DXA scan order put in, states she gets one once a year and her last one was in July of last year.

## 2023-11-18 ENCOUNTER — APPOINTMENT (OUTPATIENT)
Dept: LAB | Facility: HOSPITAL | Age: 68
End: 2023-11-18
Attending: FAMILY MEDICINE
Payer: COMMERCIAL

## 2023-11-18 DIAGNOSIS — D64.9 ANEMIA, UNSPECIFIED TYPE: ICD-10-CM

## 2023-11-18 DIAGNOSIS — Z13.6 SCREENING FOR CARDIOVASCULAR CONDITION: ICD-10-CM

## 2023-11-18 DIAGNOSIS — E11.9 DIABETES MELLITUS WITHOUT COMPLICATION (HCC): ICD-10-CM

## 2023-11-18 LAB
ANION GAP SERPL CALCULATED.3IONS-SCNC: 7 MMOL/L
BASOPHILS # BLD AUTO: 0.05 THOUSANDS/ÂΜL (ref 0–0.1)
BASOPHILS NFR BLD AUTO: 1 % (ref 0–1)
BUN SERPL-MCNC: 13 MG/DL (ref 5–25)
CALCIUM SERPL-MCNC: 9 MG/DL (ref 8.4–10.2)
CHLORIDE SERPL-SCNC: 105 MMOL/L (ref 96–108)
CO2 SERPL-SCNC: 25 MMOL/L (ref 21–32)
CREAT SERPL-MCNC: 0.74 MG/DL (ref 0.6–1.3)
EOSINOPHIL # BLD AUTO: 0.21 THOUSAND/ÂΜL (ref 0–0.61)
EOSINOPHIL NFR BLD AUTO: 3 % (ref 0–6)
ERYTHROCYTE [DISTWIDTH] IN BLOOD BY AUTOMATED COUNT: 21.2 % (ref 11.6–15.1)
EST. AVERAGE GLUCOSE BLD GHB EST-MCNC: 140 MG/DL
GFR SERPL CREATININE-BSD FRML MDRD: 84 ML/MIN/1.73SQ M
GLUCOSE P FAST SERPL-MCNC: 105 MG/DL (ref 65–99)
HBA1C MFR BLD: 6.5 %
HCT VFR BLD AUTO: 28.4 % (ref 34.8–46.1)
HGB BLD-MCNC: 8.3 G/DL (ref 11.5–15.4)
IMM GRANULOCYTES # BLD AUTO: 0.02 THOUSAND/UL (ref 0–0.2)
IMM GRANULOCYTES NFR BLD AUTO: 0 % (ref 0–2)
LYMPHOCYTES # BLD AUTO: 2.88 THOUSANDS/ÂΜL (ref 0.6–4.47)
LYMPHOCYTES NFR BLD AUTO: 34 % (ref 14–44)
MCH RBC QN AUTO: 24.2 PG (ref 26.8–34.3)
MCHC RBC AUTO-ENTMCNC: 29.2 G/DL (ref 31.4–37.4)
MCV RBC AUTO: 83 FL (ref 82–98)
MONOCYTES # BLD AUTO: 0.58 THOUSAND/ÂΜL (ref 0.17–1.22)
MONOCYTES NFR BLD AUTO: 7 % (ref 4–12)
NEUTROPHILS # BLD AUTO: 4.62 THOUSANDS/ÂΜL (ref 1.85–7.62)
NEUTS SEG NFR BLD AUTO: 55 % (ref 43–75)
NRBC BLD AUTO-RTO: 0 /100 WBCS
PLATELET # BLD AUTO: 397 THOUSANDS/UL (ref 149–390)
PMV BLD AUTO: 9.8 FL (ref 8.9–12.7)
POTASSIUM SERPL-SCNC: 4.2 MMOL/L (ref 3.5–5.3)
RBC # BLD AUTO: 3.43 MILLION/UL (ref 3.81–5.12)
SODIUM SERPL-SCNC: 137 MMOL/L (ref 135–147)
WBC # BLD AUTO: 8.36 THOUSAND/UL (ref 4.31–10.16)

## 2023-11-18 PROCEDURE — 36415 COLL VENOUS BLD VENIPUNCTURE: CPT

## 2023-11-18 PROCEDURE — 85025 COMPLETE CBC W/AUTO DIFF WBC: CPT

## 2023-11-18 PROCEDURE — 83036 HEMOGLOBIN GLYCOSYLATED A1C: CPT

## 2023-11-18 PROCEDURE — 80048 BASIC METABOLIC PNL TOTAL CA: CPT

## 2023-11-22 ENCOUNTER — TELEPHONE (OUTPATIENT)
Dept: HEMATOLOGY ONCOLOGY | Facility: CLINIC | Age: 68
End: 2023-11-22

## 2023-11-22 ENCOUNTER — TELEPHONE (OUTPATIENT)
Dept: FAMILY MEDICINE CLINIC | Facility: CLINIC | Age: 68
End: 2023-11-22

## 2023-11-22 ENCOUNTER — TELEPHONE (OUTPATIENT)
Age: 68
End: 2023-11-22

## 2023-11-22 DIAGNOSIS — D50.8 OTHER IRON DEFICIENCY ANEMIA: Primary | ICD-10-CM

## 2023-11-22 NOTE — TELEPHONE ENCOUNTER
Appointment Schedule   Who are you speaking with? Patient   If it is not the patient, are they listed on an active communication consent form? N/A   Which provider is the appointment scheduled with? Dr. Jennifer Santiago   At which location is the appointment scheduled for? [facility]   When is the appointment scheduled? Please list date and time 1/3/24 @ 320   What is the reason for this appointment? Other iron deficiency anemia    Did patient voice understanding of the details of this appointment? Yes   Was the no show policy reviewed with patient?  Yes

## 2023-11-22 NOTE — TELEPHONE ENCOUNTER
The patient needs a Insurance referral for Hematology     Patient is confuse she will call back with the rest of the information

## 2023-11-29 DIAGNOSIS — E11.9 DIABETES MELLITUS WITHOUT COMPLICATION (HCC): ICD-10-CM

## 2023-11-29 DIAGNOSIS — R52 PAIN: ICD-10-CM

## 2023-11-30 DIAGNOSIS — E11.9 DIABETES MELLITUS WITHOUT COMPLICATION (HCC): ICD-10-CM

## 2023-11-30 RX ORDER — METFORMIN HYDROCHLORIDE 500 MG/1
500 TABLET, EXTENDED RELEASE ORAL 2 TIMES DAILY WITH MEALS
Qty: 60 TABLET | Refills: 0 | OUTPATIENT
Start: 2023-11-30

## 2023-11-30 RX ORDER — CYCLOBENZAPRINE HCL 5 MG
TABLET ORAL
Qty: 30 TABLET | Refills: 0 | Status: SHIPPED | OUTPATIENT
Start: 2023-11-30

## 2023-11-30 RX ORDER — METFORMIN HYDROCHLORIDE 500 MG/1
500 TABLET, EXTENDED RELEASE ORAL 2 TIMES DAILY WITH MEALS
Qty: 60 TABLET | Refills: 0 | Status: SHIPPED | OUTPATIENT
Start: 2023-11-30

## 2023-11-30 NOTE — TELEPHONE ENCOUNTER
Patient called into the rx refill line requesting refills on two medications I did advise patient medications were sent to pharmacy already to call pharmacy to see if medications were available for her for . Patient agreed to call pharmacy. I did advise patient to also schedule follow up visit with pcp per chart to call back in press option for scheduling. I did also advise if the pharmacy did not get the medication she can call back and we can resend in the medication. Patient also proceed to say she no longer needs a refill on Naproxen.  Call completed    E-Prescribing Status: Receipt confirmed by pharmacy (11/30/2023  9:02 AM EST)   medication metformin  Receipt confirmed by pharmacy (11/30/2023 10:03 AM EST)  Medication flexeril    Pharmacy  [address] [place] [address]

## 2023-11-30 NOTE — TELEPHONE ENCOUNTER
LOV: 10/13/2022 [facility] [address] [place] [address] Medicine    Refill must be reviewed and completed by the office or provider.  The refill is unable to be approved by the medication management team.

## 2023-11-30 NOTE — TELEPHONE ENCOUNTER
LOV: 10/13/2022 [facility] Medicine  Patient needs an appointment. Please contact the patient to schedule an appointment. Courtesy refill provided.

## 2023-12-12 ENCOUNTER — OFFICE VISIT (OUTPATIENT)
Dept: FAMILY MEDICINE CLINIC | Facility: CLINIC | Age: 68
End: 2023-12-12
Payer: COMMERCIAL

## 2023-12-12 VITALS
HEART RATE: 89 BPM | SYSTOLIC BLOOD PRESSURE: 110 MMHG | HEIGHT: 59 IN | WEIGHT: 216.6 LBS | BODY MASS INDEX: 43.67 KG/M2 | OXYGEN SATURATION: 94 % | DIASTOLIC BLOOD PRESSURE: 70 MMHG | TEMPERATURE: 96.8 F

## 2023-12-12 DIAGNOSIS — M85.80 OSTEOPENIA, UNSPECIFIED LOCATION: ICD-10-CM

## 2023-12-12 DIAGNOSIS — E11.9 DIABETES MELLITUS WITHOUT COMPLICATION (HCC): Primary | ICD-10-CM

## 2023-12-12 DIAGNOSIS — D64.9 ANEMIA, UNSPECIFIED TYPE: ICD-10-CM

## 2023-12-12 DIAGNOSIS — E55.9 VITAMIN D DEFICIENCY: ICD-10-CM

## 2023-12-12 DIAGNOSIS — E78.00 HYPERCHOLESTEROLEMIA: ICD-10-CM

## 2023-12-12 DIAGNOSIS — Z12.11 SCREENING FOR COLON CANCER: ICD-10-CM

## 2023-12-12 PROCEDURE — 99214 OFFICE O/P EST MOD 30 MIN: CPT | Performed by: FAMILY MEDICINE

## 2023-12-12 NOTE — ASSESSMENT & PLAN NOTE
A1C better at 6.5 in Nov 2023. Continue metformin  mg bid. Advised pt to follow a low carb diet and to exercise on a regular basis. Pt had eye exam in June 2022 by Dr Giselle Johnson. Foot exam done today.     Lab Results   Component Value Date    HGBA1C 6.5 (H) 11/18/2023

## 2023-12-12 NOTE — ASSESSMENT & PLAN NOTE
Hgb down to 8.3 in Nov 2023. Patient told to increase iron pill to twice a day and I referred her to Hematology for possible iron infusions. Her appointment is in Jan 2024.

## 2023-12-12 NOTE — PROGRESS NOTES
BMI Counseling: Body mass index is 43.75 kg/m². The BMI is above normal. Nutrition recommendations include decreasing portion sizes, encouraging healthy choices of fruits and vegetables, consuming healthier snacks and moderation in carbohydrate intake. Exercise recommendations include exercising 3-5 times per week. No pharmacotherapy was ordered. Rationale for BMI follow-up plan is due to patient being overweight or obese. Depression Screening and Follow-up Plan: Patient was screened for depression during today's encounter. They screened negative with a PHQ-2 score of 0. Assessment/Plan:         Problem List Items Addressed This Visit        Endocrine    Diabetes mellitus without complication ([address]) - Primary     A1C better at 6.5 in Nov 2023. Continue metformin  mg bid. Advised pt to follow a low carb diet and to exercise on a regular basis. Pt had eye exam in June 2022 by Dr Nicole Morales. Foot exam done today. Lab Results   Component Value Date    HGBA1C 6.5 (H) 11/18/2023               Musculoskeletal and Integument    Osteopenia     Last dexascan was in July 2022. T-score was -2.3 in lumbar spine and left femoral neck. Pt advised to take calcium 1200 mg qd and Vit D 1000 U qd. Pt also advised to perform weight-bearing exercise on a regular basis. Recheck dexascan in July 2024. Other    Vitamin D deficiency     Recheck level. Alternates 48853 U and 5000 U every other day. Relevant Orders    Vitamin D 25 hydroxy    Hypercholesterolemia     Recheck lipids. Advised pt to follow a low cholesterol diet and to exercise on a regular basis. Relevant Orders    Lipid panel    Anemia     Hgb down to 8.3 in Nov 2023. Patient told to increase iron pill to twice a day and I referred her to Hematology for possible iron infusions. Her appointment is in Jan 2024.           Relevant Orders    CBC and differential   Other Visit Diagnoses     Screening for colon cancer        Relevant Orders Ferritin    TIBC Panel (incl. Iron, TIBC, % Iron Saturation)    Ambulatory Referral to Gastroenterology            Subjective:      Patient ID: Steward Barthel is a 80 y.o. female. Patient here for follow-up DM, Hyperlipidemia, Anemia, Osteopenia, Vitamin D deficiency. Doing ok. Gets shortness of breath at times and feels dizzy at times. No chest pain. No headaches. Had labs done recently. Sugars have been lower. Has been cutting back on carbs. Had labs recently and Hgb lower. Patient was told to increase iron pills to twice a day. Going to see Hematology in Jan 2024. Going for mammogram later this month. Anemia  There has been no abdominal pain or palpitations. The following portions of the patient's history were reviewed and updated as appropriate:   Past Medical History:  She has a past medical history of Fibromyalgia and Hyperlipidemia.,  _______________________________________________________________________  Medical Problems:  does not have any pertinent problems on file.,  _______________________________________________________________________  Past Surgical History:   has a past surgical history that includes Gastric bypass; Mediastinoscopy; Cholecystectomy; and Mammo (historical) (05/2012). ,  _______________________________________________________________________  Family History:  family history includes COPD in her mother; Diabetes in her father, maternal aunt, and mother; Heart disease in her father; Hypertension in her father, maternal grandmother, and mother; Lung cancer (age of onset: 39) in her other; Stroke in her father.,  _______________________________________________________________________  Social History:   reports that she has never smoked. She has never used smokeless tobacco. She reports that she does not currently use alcohol. She reports that she does not currently use drugs. ,  _______________________________________________________________________  Allergies:  has No Known Allergies. .  _______________________________________________________________________  Current Outpatient Medications   Medication Sig Dispense Refill   • Calcium Carbonate-Vit D-Min (CALCIUM 1200 PO) Take by mouth     • Cholecalciferol (Vitamin D3) 125 MCG (5000 UT) TABS Take 5,000 Units by mouth every other day Alternating 5000 and 49224 every other day     • cyclobenzaprine (FLEXERIL) 5 mg tablet TAKE ONE TABLET BY MOUTH THREE TIMES DAILY AS NEEDED FOR MUSCLE SPASM 30 tablet 0   • Ferrous Sulfate (SLOW FE PO) Take by mouth 2 (two) times a day     • glucose blood test strip Check blood sugar once a day 100 strip 5   • Lancets (onetouch ultrasoft) lancets Check blood sugar once a day 100 each 5   • metFORMIN (GLUCOPHAGE-XR) 500 mg 24 hr tablet TAKE ONE TABLET BY MOUTH TWICE A DAY WITH MEALS 60 tablet 0   • multivitamin (THERAGRAN) TABS Take 1 tablet by mouth daily     • naproxen (NAPROSYN) 500 mg tablet Take 1 tab as needed every 12 hrs 30 tablet 5     No current facility-administered medications for this visit.     _______________________________________________________________________  Review of Systems   Constitutional:  Negative for fatigue and unexpected weight change. Respiratory:  Positive for shortness of breath. Negative for cough and chest tightness. Cardiovascular:  Negative for chest pain and palpitations. Gastrointestinal:  Negative for abdominal pain, constipation, diarrhea, nausea and vomiting. Genitourinary:  Negative for difficulty urinating. Musculoskeletal:  Negative for arthralgias. Skin:  Negative for rash. Neurological:  Positive for dizziness. Negative for headaches.          Objective:  Vitals:    12/12/23 1340 12/12/23 1408   BP:  110/70   BP Location:  Left arm   Patient Position:  Sitting   Cuff Size:  Large   Pulse: 89    Temp: (!) 96.8 °F (36 °C)    TempSrc: Tympanic    SpO2: 94%    Weight: 98.2 kg (216 lb 9.6 oz)    Height: 4' 11" (1.499 m)      Body mass index is 43.75 kg/m². Physical Exam  Vitals and nursing note reviewed. Constitutional:       Appearance: Normal appearance. She is well-developed. She is obese. HENT:      Head: Normocephalic and atraumatic. Cardiovascular:      Rate and Rhythm: Normal rate and regular rhythm. Heart sounds: Normal heart sounds. No murmur heard. Pulmonary:      Effort: Pulmonary effort is normal. No respiratory distress. Breath sounds: Normal breath sounds. No wheezing. Musculoskeletal:      Cervical back: Normal range of motion and neck supple. Right lower leg: Edema present. Left lower leg: Edema present. Lymphadenopathy:      Cervical: No cervical adenopathy. Neurological:      Mental Status: She is alert and oriented to person, place, and time. Psychiatric:         Mood and Affect: Mood normal.         Behavior: Behavior normal.         Thought Content:  Thought content normal.         Judgment: Judgment normal.

## 2023-12-12 NOTE — ASSESSMENT & PLAN NOTE
Last dexascan was in July 2022. T-score was -2.3 in lumbar spine and left femoral neck. Pt advised to take calcium 1200 mg qd and Vit D 1000 U qd. Pt also advised to perform weight-bearing exercise on a regular basis. Recheck dexascan in July 2024.

## 2023-12-28 DIAGNOSIS — R52 PAIN: ICD-10-CM

## 2023-12-28 DIAGNOSIS — E11.9 DIABETES MELLITUS WITHOUT COMPLICATION (HCC): ICD-10-CM

## 2023-12-28 RX ORDER — METFORMIN HYDROCHLORIDE 500 MG/1
500 TABLET, EXTENDED RELEASE ORAL 2 TIMES DAILY WITH MEALS
Qty: 60 TABLET | Refills: 0 | OUTPATIENT
Start: 2023-12-28 | End: 2023-12-31

## 2023-12-28 RX ORDER — METFORMIN HYDROCHLORIDE 500 MG/1
500 TABLET, EXTENDED RELEASE ORAL 2 TIMES DAILY WITH MEALS
Qty: 60 TABLET | Refills: 0 | Status: SHIPPED | OUTPATIENT
Start: 2023-12-28

## 2023-12-28 RX ORDER — CYCLOBENZAPRINE HCL 5 MG
5 TABLET ORAL
Qty: 30 TABLET | Refills: 0 | Status: SHIPPED | OUTPATIENT
Start: 2023-12-28

## 2023-12-28 RX ORDER — NAPROXEN 500 MG/1
TABLET ORAL
Qty: 30 TABLET | Refills: 5 | OUTPATIENT
Start: 2023-12-28 | End: 2023-12-31

## 2023-12-29 ENCOUNTER — HOSPITAL ENCOUNTER (INPATIENT)
Facility: HOSPITAL | Age: 68
LOS: 2 days | Discharge: HOME | DRG: 812 | End: 2023-12-31
Attending: EMERGENCY MEDICINE | Admitting: INTERNAL MEDICINE
Payer: MEDICARE

## 2023-12-29 ENCOUNTER — APPOINTMENT (EMERGENCY)
Dept: RADIOLOGY | Facility: HOSPITAL | Age: 68
DRG: 812 | End: 2023-12-29
Payer: MEDICARE

## 2023-12-29 DIAGNOSIS — R42 ORTHOSTATIC LIGHTHEADEDNESS: ICD-10-CM

## 2023-12-29 DIAGNOSIS — R06.09 DYSPNEA ON MINIMAL EXERTION: ICD-10-CM

## 2023-12-29 DIAGNOSIS — R26.81 GAIT INSTABILITY: ICD-10-CM

## 2023-12-29 DIAGNOSIS — R42 DIZZINESS ON STANDING: ICD-10-CM

## 2023-12-29 DIAGNOSIS — R05.9 COUGH: ICD-10-CM

## 2023-12-29 DIAGNOSIS — D64.9 ANEMIA: Primary | ICD-10-CM

## 2023-12-29 LAB
2HR DELTA HS TROPONIN: 1 NG/L
ALBUMIN SERPL BCP-MCNC: 3.9 G/DL (ref 3.5–5)
ALP SERPL-CCNC: 98 U/L (ref 34–104)
ALT SERPL W P-5'-P-CCNC: 15 U/L (ref 7–52)
ANION GAP SERPL CALCULATED.3IONS-SCNC: 8 MMOL/L
AST SERPL W P-5'-P-CCNC: 30 U/L (ref 13–39)
BASOPHILS # BLD AUTO: 0.05 THOUSANDS/ÂΜL (ref 0–0.1)
BASOPHILS NFR BLD AUTO: 1 % (ref 0–1)
BILIRUB SERPL-MCNC: 0.44 MG/DL (ref 0.2–1)
BNP SERPL-MCNC: 53 PG/ML (ref 0–100)
BUN SERPL-MCNC: 11 MG/DL (ref 5–25)
CALCIUM SERPL-MCNC: 9.5 MG/DL (ref 8.4–10.2)
CARDIAC TROPONIN I PNL SERPL HS: 2 NG/L
CARDIAC TROPONIN I PNL SERPL HS: 3 NG/L
CHLORIDE SERPL-SCNC: 104 MMOL/L (ref 96–108)
CO2 SERPL-SCNC: 25 MMOL/L (ref 21–32)
CREAT SERPL-MCNC: 0.79 MG/DL (ref 0.6–1.3)
D DIMER PPP FEU-MCNC: 0.43 UG/ML FEU
EOSINOPHIL # BLD AUTO: 0.22 THOUSAND/ÂΜL (ref 0–0.61)
EOSINOPHIL NFR BLD AUTO: 3 % (ref 0–6)
ERYTHROCYTE [DISTWIDTH] IN BLOOD BY AUTOMATED COUNT: 17.9 % (ref 11.6–15.1)
FLUAV RNA RESP QL NAA+PROBE: NEGATIVE
FLUBV RNA RESP QL NAA+PROBE: NEGATIVE
GFR SERPL CREATININE-BSD FRML MDRD: 77 ML/MIN/1.73SQ M
GLUCOSE SERPL-MCNC: 126 MG/DL (ref 65–140)
HCT VFR BLD AUTO: 30.4 % (ref 34.8–46.1)
HGB BLD-MCNC: 8.7 G/DL (ref 11.5–15.4)
IMM GRANULOCYTES # BLD AUTO: 0.02 THOUSAND/UL (ref 0–0.2)
IMM GRANULOCYTES NFR BLD AUTO: 0 % (ref 0–2)
LYMPHOCYTES # BLD AUTO: 2.76 THOUSANDS/ÂΜL (ref 0.6–4.47)
LYMPHOCYTES NFR BLD AUTO: 32 % (ref 14–44)
MCH RBC QN AUTO: 23.9 PG (ref 26.8–34.3)
MCHC RBC AUTO-ENTMCNC: 28.6 G/DL (ref 31.4–37.4)
MCV RBC AUTO: 84 FL (ref 82–98)
MONOCYTES # BLD AUTO: 0.6 THOUSAND/ÂΜL (ref 0.17–1.22)
MONOCYTES NFR BLD AUTO: 7 % (ref 4–12)
NEUTROPHILS # BLD AUTO: 4.93 THOUSANDS/ÂΜL (ref 1.85–7.62)
NEUTS SEG NFR BLD AUTO: 57 % (ref 43–75)
NRBC BLD AUTO-RTO: 0 /100 WBCS
PLATELET # BLD AUTO: 445 THOUSANDS/UL (ref 149–390)
PMV BLD AUTO: 10.1 FL (ref 8.9–12.7)
POTASSIUM SERPL-SCNC: 4 MMOL/L (ref 3.5–5.3)
PROT SERPL-MCNC: 7.4 G/DL (ref 6.4–8.4)
RBC # BLD AUTO: 3.64 MILLION/UL (ref 3.81–5.12)
RSV RNA RESP QL NAA+PROBE: NEGATIVE
SARS-COV-2 RNA RESP QL NAA+PROBE: NEGATIVE
SODIUM SERPL-SCNC: 137 MMOL/L (ref 135–147)
WBC # BLD AUTO: 8.58 THOUSAND/UL (ref 4.31–10.16)

## 2023-12-29 PROCEDURE — 86901 BLOOD TYPING SEROLOGIC RH(D): CPT

## 2023-12-29 PROCEDURE — 85025 COMPLETE CBC W/AUTO DIFF WBC: CPT

## 2023-12-29 PROCEDURE — 80053 COMPREHEN METABOLIC PANEL: CPT

## 2023-12-29 PROCEDURE — 99223 1ST HOSP IP/OBS HIGH 75: CPT | Performed by: INTERNAL MEDICINE

## 2023-12-29 PROCEDURE — 86900 BLOOD TYPING SEROLOGIC ABO: CPT

## 2023-12-29 PROCEDURE — 83550 IRON BINDING TEST: CPT | Performed by: INTERNAL MEDICINE

## 2023-12-29 PROCEDURE — 0241U HB NFCT DS VIR RESP RNA 4 TRGT: CPT

## 2023-12-29 PROCEDURE — 86850 RBC ANTIBODY SCREEN: CPT

## 2023-12-29 PROCEDURE — 36415 COLL VENOUS BLD VENIPUNCTURE: CPT

## 2023-12-29 PROCEDURE — 84484 ASSAY OF TROPONIN QUANT: CPT

## 2023-12-29 PROCEDURE — 30233N1 TRANSFUSION OF NONAUTOLOGOUS RED BLOOD CELLS INTO PERIPHERAL VEIN, PERCUTANEOUS APPROACH: ICD-10-PCS | Performed by: EMERGENCY MEDICINE

## 2023-12-29 PROCEDURE — 99285 EMERGENCY DEPT VISIT HI MDM: CPT | Performed by: EMERGENCY MEDICINE

## 2023-12-29 PROCEDURE — 99285 EMERGENCY DEPT VISIT HI MDM: CPT

## 2023-12-29 PROCEDURE — 93005 ELECTROCARDIOGRAM TRACING: CPT

## 2023-12-29 PROCEDURE — 86920 COMPATIBILITY TEST SPIN: CPT

## 2023-12-29 PROCEDURE — 71045 X-RAY EXAM CHEST 1 VIEW: CPT

## 2023-12-29 PROCEDURE — 83880 ASSAY OF NATRIURETIC PEPTIDE: CPT

## 2023-12-29 PROCEDURE — 85379 FIBRIN DEGRADATION QUANT: CPT

## 2023-12-29 PROCEDURE — 83540 ASSAY OF IRON: CPT | Performed by: INTERNAL MEDICINE

## 2023-12-29 PROCEDURE — 82728 ASSAY OF FERRITIN: CPT | Performed by: INTERNAL MEDICINE

## 2023-12-29 RX ORDER — INSULIN LISPRO 100 [IU]/ML
1-5 INJECTION, SOLUTION INTRAVENOUS; SUBCUTANEOUS
Status: DISCONTINUED | OUTPATIENT
Start: 2023-12-29 | End: 2023-12-31 | Stop reason: HOSPADM

## 2023-12-29 RX ORDER — FERROUS SULFATE 325(65) MG
325 TABLET ORAL
Status: DISCONTINUED | OUTPATIENT
Start: 2023-12-30 | End: 2023-12-31 | Stop reason: HOSPADM

## 2023-12-29 RX ORDER — ACETAMINOPHEN 325 MG/1
650 TABLET ORAL ONCE
Status: COMPLETED | OUTPATIENT
Start: 2023-12-29 | End: 2023-12-29

## 2023-12-29 RX ORDER — ONDANSETRON 2 MG/ML
4 INJECTION INTRAMUSCULAR; INTRAVENOUS EVERY 6 HOURS PRN
Status: DISCONTINUED | OUTPATIENT
Start: 2023-12-29 | End: 2023-12-31 | Stop reason: HOSPADM

## 2023-12-29 RX ORDER — LANOLIN ALCOHOL/MO/W.PET/CERES
1 CREAM (GRAM) TOPICAL 2 TIMES DAILY WITH MEALS
Status: DISCONTINUED | OUTPATIENT
Start: 2023-12-30 | End: 2023-12-31 | Stop reason: HOSPADM

## 2023-12-29 RX ORDER — MAGNESIUM HYDROXIDE/ALUMINUM HYDROXICE/SIMETHICONE 120; 1200; 1200 MG/30ML; MG/30ML; MG/30ML
30 SUSPENSION ORAL EVERY 6 HOURS PRN
Status: DISCONTINUED | OUTPATIENT
Start: 2023-12-29 | End: 2023-12-31 | Stop reason: HOSPADM

## 2023-12-29 RX ORDER — SODIUM CHLORIDE 9 MG/ML
100 INJECTION, SOLUTION INTRAVENOUS CONTINUOUS
Status: DISCONTINUED | OUTPATIENT
Start: 2023-12-29 | End: 2023-12-30

## 2023-12-29 RX ORDER — CYCLOBENZAPRINE HCL 10 MG
5 TABLET ORAL
Status: DISCONTINUED | OUTPATIENT
Start: 2023-12-30 | End: 2023-12-31 | Stop reason: HOSPADM

## 2023-12-29 RX ADMIN — ACETAMINOPHEN 650 MG: 325 TABLET, FILM COATED ORAL at 22:27

## 2023-12-30 PROBLEM — R42 DIZZINESS: Status: ACTIVE | Noted: 2023-12-30

## 2023-12-30 LAB
ABO GROUP BLD BPU: NORMAL
ABO GROUP BLD: NORMAL
ABO GROUP BLD: NORMAL
ALBUMIN SERPL BCP-MCNC: 3.6 G/DL (ref 3.5–5)
ALP SERPL-CCNC: 87 U/L (ref 34–104)
ALT SERPL W P-5'-P-CCNC: 13 U/L (ref 7–52)
ANION GAP SERPL CALCULATED.3IONS-SCNC: 6 MMOL/L
AST SERPL W P-5'-P-CCNC: 25 U/L (ref 13–39)
ATRIAL RATE: 99 BPM
BASOPHILS # BLD AUTO: 0.05 THOUSANDS/ÂΜL (ref 0–0.1)
BASOPHILS NFR BLD AUTO: 1 % (ref 0–1)
BILIRUB SERPL-MCNC: 1.26 MG/DL (ref 0.2–1)
BLD GP AB SCN SERPL QL: NEGATIVE
BPU ID: NORMAL
BUN SERPL-MCNC: 10 MG/DL (ref 5–25)
CALCIUM SERPL-MCNC: 9.1 MG/DL (ref 8.4–10.2)
CHLORIDE SERPL-SCNC: 105 MMOL/L (ref 96–108)
CO2 SERPL-SCNC: 26 MMOL/L (ref 21–32)
CREAT SERPL-MCNC: 0.74 MG/DL (ref 0.6–1.3)
CROSSMATCH: NORMAL
EOSINOPHIL # BLD AUTO: 0.13 THOUSAND/ÂΜL (ref 0–0.61)
EOSINOPHIL NFR BLD AUTO: 2 % (ref 0–6)
ERYTHROCYTE [DISTWIDTH] IN BLOOD BY AUTOMATED COUNT: 17.4 % (ref 11.6–15.1)
FERRITIN SERPL-MCNC: 7 NG/ML (ref 11–307)
FOLATE SERPL-MCNC: 10.7 NG/ML
GFR SERPL CREATININE-BSD FRML MDRD: 84 ML/MIN/1.73SQ M
GLUCOSE SERPL-MCNC: 117 MG/DL (ref 65–140)
GLUCOSE SERPL-MCNC: 122 MG/DL (ref 65–140)
GLUCOSE SERPL-MCNC: 144 MG/DL (ref 65–140)
GLUCOSE SERPL-MCNC: 150 MG/DL (ref 65–140)
GLUCOSE SERPL-MCNC: 177 MG/DL (ref 65–140)
HCT VFR BLD AUTO: 30.1 % (ref 34.8–46.1)
HGB BLD-MCNC: 9.2 G/DL (ref 11.5–15.4)
IMM GRANULOCYTES # BLD AUTO: 0.02 THOUSAND/UL (ref 0–0.2)
IMM GRANULOCYTES NFR BLD AUTO: 0 % (ref 0–2)
IRON SATN MFR SERPL: 5 % (ref 15–50)
IRON SERPL-MCNC: 23 UG/DL (ref 50–212)
LYMPHOCYTES # BLD AUTO: 2.41 THOUSANDS/ÂΜL (ref 0.6–4.47)
LYMPHOCYTES NFR BLD AUTO: 30 % (ref 14–44)
MCH RBC QN AUTO: 25 PG (ref 26.8–34.3)
MCHC RBC AUTO-ENTMCNC: 30.6 G/DL (ref 31.4–37.4)
MCV RBC AUTO: 82 FL (ref 82–98)
MONOCYTES # BLD AUTO: 0.55 THOUSAND/ÂΜL (ref 0.17–1.22)
MONOCYTES NFR BLD AUTO: 7 % (ref 4–12)
NEUTROPHILS # BLD AUTO: 4.81 THOUSANDS/ÂΜL (ref 1.85–7.62)
NEUTS SEG NFR BLD AUTO: 60 % (ref 43–75)
NRBC BLD AUTO-RTO: 0 /100 WBCS
P AXIS: 57 DEGREES
PLATELET # BLD AUTO: 408 THOUSANDS/UL (ref 149–390)
PMV BLD AUTO: 10.2 FL (ref 8.9–12.7)
POTASSIUM SERPL-SCNC: 3.8 MMOL/L (ref 3.5–5.3)
PR INTERVAL: 132 MS
PROT SERPL-MCNC: 6.8 G/DL (ref 6.4–8.4)
QRS AXIS: 66 DEGREES
QRSD INTERVAL: 80 MS
QT INTERVAL: 342 MS
QTC INTERVAL: 438 MS
RBC # BLD AUTO: 3.68 MILLION/UL (ref 3.81–5.12)
RH BLD: POSITIVE
RH BLD: POSITIVE
SODIUM SERPL-SCNC: 137 MMOL/L (ref 135–147)
SPECIMEN EXPIRATION DATE: NORMAL
T WAVE AXIS: 41 DEGREES
TIBC SERPL-MCNC: 437 UG/DL (ref 250–450)
UIBC SERPL-MCNC: 414 UG/DL (ref 155–355)
UNIT DISPENSE STATUS: NORMAL
UNIT PRODUCT CODE: NORMAL
UNIT PRODUCT VOLUME: 350 ML
UNIT RH: NORMAL
VENTRICULAR RATE: 99 BPM
VIT B12 SERPL-MCNC: 151 PG/ML (ref 180–914)
WBC # BLD AUTO: 7.97 THOUSAND/UL (ref 4.31–10.16)

## 2023-12-30 PROCEDURE — 82948 REAGENT STRIP/BLOOD GLUCOSE: CPT

## 2023-12-30 PROCEDURE — 82607 VITAMIN B-12: CPT | Performed by: INTERNAL MEDICINE

## 2023-12-30 PROCEDURE — 82746 ASSAY OF FOLIC ACID SERUM: CPT | Performed by: INTERNAL MEDICINE

## 2023-12-30 PROCEDURE — 99232 SBSQ HOSP IP/OBS MODERATE 35: CPT | Performed by: INTERNAL MEDICINE

## 2023-12-30 PROCEDURE — 36415 COLL VENOUS BLD VENIPUNCTURE: CPT | Performed by: INTERNAL MEDICINE

## 2023-12-30 PROCEDURE — 80053 COMPREHEN METABOLIC PANEL: CPT | Performed by: INTERNAL MEDICINE

## 2023-12-30 PROCEDURE — 85025 COMPLETE CBC W/AUTO DIFF WBC: CPT | Performed by: INTERNAL MEDICINE

## 2023-12-30 PROCEDURE — P9016 RBC LEUKOCYTES REDUCED: HCPCS

## 2023-12-30 RX ORDER — HEPARIN SODIUM 5000 [USP'U]/ML
5000 INJECTION, SOLUTION INTRAVENOUS; SUBCUTANEOUS EVERY 8 HOURS SCHEDULED
Status: DISCONTINUED | OUTPATIENT
Start: 2023-12-30 | End: 2023-12-31 | Stop reason: HOSPADM

## 2023-12-30 RX ORDER — MELATONIN
5000 DAILY
Status: DISCONTINUED | OUTPATIENT
Start: 2023-12-30 | End: 2023-12-31 | Stop reason: HOSPADM

## 2023-12-30 RX ORDER — ACETAMINOPHEN 325 MG/1
650 TABLET ORAL EVERY 6 HOURS PRN
Status: DISCONTINUED | OUTPATIENT
Start: 2023-12-30 | End: 2023-12-31 | Stop reason: HOSPADM

## 2023-12-30 RX ADMIN — Medication 1 TABLET: at 16:51

## 2023-12-30 RX ADMIN — ACETAMINOPHEN 650 MG: 325 TABLET, FILM COATED ORAL at 09:43

## 2023-12-30 RX ADMIN — CYCLOBENZAPRINE 5 MG: 10 TABLET, FILM COATED ORAL at 21:54

## 2023-12-30 RX ADMIN — HEPARIN SODIUM 5000 UNITS: 5000 INJECTION INTRAVENOUS; SUBCUTANEOUS at 13:39

## 2023-12-30 RX ADMIN — HEPARIN SODIUM 5000 UNITS: 5000 INJECTION INTRAVENOUS; SUBCUTANEOUS at 21:54

## 2023-12-30 RX ADMIN — Medication 5000 UNITS: at 08:31

## 2023-12-30 RX ADMIN — Medication 1 TABLET: at 08:34

## 2023-12-30 RX ADMIN — ACETAMINOPHEN 650 MG: 325 TABLET, FILM COATED ORAL at 19:00

## 2023-12-30 RX ADMIN — B-COMPLEX W/ C & FOLIC ACID TAB 1 TABLET: TAB at 09:43

## 2023-12-30 NOTE — ASSESSMENT & PLAN NOTE
Patient states she has had chronic anemia for several years even as a child  She states multiple family members also has anemia including her mother and son  She states she does not know the cause.  Denies any history of hemoglobinopathy that she is aware of.  She received iron infusion in the past and on iron supplementation  Came in because she had an acute drop in her hemoglobin for which she is symptomatic  She was orthostatic in the ED with a heart rate jumping to 125 with symptoms  Her baseline hemoglobin is about 10 and currently she is down to 8.7  She will be transfused with 1 unit of packed RBCs  Will had iron panel to previous labs  She is normocytic.  Will also had B12 and folate level  She was due to see a hematologist as outpatient  Will consult hematology if she remains symptomatic or no improvement with transfusion  Will also send for occult blood for testing  She would need to follow-up with her PCP for colonoscopy  She will be on telemetry watch for now

## 2023-12-30 NOTE — ASSESSMENT & PLAN NOTE
Patient has had chronic iron deficiency anemia.  She is on iron supplementation and has received iron infusions in the past  Her baseline hemoglobin used to be about 10.  Currently it is down to 8.7  She will be transfused with 1 unit packed RBCs

## 2023-12-30 NOTE — H&P
"[facility]  Progress Note  Name: Rosalie Pelaez I  MRN: [id number]  Unit/Bed#: ED OVR 22 I Date of Admission: 12/29/2023   Date of Service: 12/30/2023 I Hospital Day: 1    Assessment/Plan   * Acute on chronic anemia  Assessment & Plan  Patient states she has had chronic anemia for several years even as a child  She states multiple family members also has anemia including her mother and son  She states she does not know the cause.  Denies any history of hemoglobinopathy that she is aware of.  She received iron infusion in the past and on iron supplementation  Came in because she had an acute drop in her hemoglobin for which she is symptomatic  She was orthostatic in the ED with a heart rate jumping to 125 with symptoms  Her baseline hemoglobin is about 10 and currently she is down to 8.7  She will be transfused with 1 unit of packed RBCs  Will had iron panel to previous labs  She is normocytic.  Will also had B12 and folate level  She was due to see a hematologist as outpatient  Will consult hematology if she remains symptomatic or no improvement with transfusion  Will also send for occult blood for testing  She would need to follow-up with her PCP for colonoscopy  She will be on telemetry watch for now      Iron deficiency anemia  Assessment & Plan  Patient has had chronic iron deficiency anemia.  She is on iron supplementation and has received iron infusions in the past  Her baseline hemoglobin used to be about 10.  Currently it is down to 8.7  She will be transfused with 1 unit packed RBCs      Diabetes mellitus without complication (HCC)  Assessment & Plan  Lab Results   Component Value Date    HGBA1C 6.5 (H) 11/18/2023       No results for input(s): \"POCGLU\" in the last 72 hours.    Blood Sugar Average: Last 72 hrs:    Last A1c 6.5  Patient is on metformin  Will hold this for now  Continue with SSI and Accu-Cheks      Vitamin D deficiency  Assessment & Plan  Patient states she has severe " vitamin D deficiency  She currently takes 5000 IU alternating with 10,000 IU every other day  She will need to get a repeat vitamin D level as an outpatient  Continue same medication for now    Primary osteoarthritis of both knees  Assessment & Plan  Continue with adequate analgesia for better pain control  She states she takes naproxen as needed.    Given her drop in hemoglobin of unclear etiology I will hold off on this for now    Varicose veins of bilateral lower extremities with pain  Assessment & Plan  She follows with vascular as an outpatient  Continue with compression stockings           History of Present Illness     HPI:  Rosalie Pelaez is a 68 y.o. female with past medical history of chronic Fe deficiency anemia for several years, diabetes type 2, HLD, vitamin D deficiency, OA, varicose veins that comes into the ER with complaints of shortness of breath with mild exertion, lightheadedness and easy fatigability.  Patient states symptoms has been ongoing for about 2 months.  She went to see her primary care physician and she was noted to have a drop in her hemoglobin to 8.3 from a previous baseline of about 10.  She was subsequently referred to see a hematologist but has not been seen yet.  However she states her symptoms continues to get worse and she is unsteady when she gets up.  Feels very lightheaded but no syncopal or loss of consciousness.  Patient denies any passage of bright red blood per rectum.  No melanotic stool.  Patient states she has had chronic anemia even as a child and takes iron pills.  She has not had blood transfusion in the past but has had iron infusion.  She had a colonoscopy about 10 years ago where she had removal of some polyps.  She is in the works to get another colonoscopy.  Denies any abdominal pain or abdominal distention.  No new medications.  Patient states she has been told she has some form of colitis.  She has chronic diarrhea for which she takes Imodium.  In the ER she  was noted with a hemoglobin of 8.7.  She was found to be orthostatic.  She also desaturated to 87% with ambulation.  She is currently saturating 96 to 97% on room air at rest.  D-dimer negative.  Due to our symptoms she is being planned to get a unit of blood transfusion.      Historical Information   Past Medical History:   Diagnosis Date    Diabetes mellitus (HCC)     Fibromyalgia     Hyperlipidemia      Patient Active Problem List   Diagnosis    Hypercholesterolemia    Fibromyalgia    Vitamin D deficiency    Iron deficiency anemia    Primary osteoarthritis of both knees    Chronic bilateral low back pain without sciatica    Varicose veins of bilateral lower extremities with pain    Headache    Encounter for annual physical exam    Multiple joint pain    Acute on chronic anemia    Morbid obesity due to excess calories (HCC)    Diabetes mellitus without complication (HCC)    Osteopenia     Past Surgical History:   Procedure Laterality Date    CHOLECYSTECTOMY      GASTRIC BYPASS      MAMMO (HISTORICAL)  05/2012    MEDIASTINOSCOPY         Social History   Social History     Substance and Sexual Activity   Alcohol Use Not Currently     Social History     Substance and Sexual Activity   Drug Use Not Currently    Comment: Hx: Over 30 years ago.      Social History     Tobacco Use   Smoking Status Never   Smokeless Tobacco Never       Family History:   Family History   Problem Relation Age of Onset    Hypertension Mother     Diabetes Mother     COPD Mother     Stroke Father     Heart disease Father     Diabetes Father     Hypertension Father     Hypertension Maternal Grandmother     Diabetes Maternal Aunt     Lung cancer Other 41       Meds/Allergies       Current Facility-Administered Medications:     aluminum-magnesium hydroxide-simethicone (MAALOX) oral suspension 30 mL, 30 mL, Oral, Q6H PRN, Sally Miller MD    calcium carbonate-vitamin D 500 mg-5 mcg tablet 1 tablet, 1 tablet, Oral, BID With Meals, Sally ANGULO  MD Paul    cholecalciferol (VITAMIN D) oral liquid 5,000 Units, 5,000 Units, Oral, Daily, Sally Miller MD    cyclobenzaprine (FLEXERIL) tablet 5 mg, 5 mg, Oral, HS, Sally Miller MD    ferrous sulfate tablet 325 mg, 325 mg, Oral, Daily With Breakfast, Sally Miller MD    insulin lispro (HumaLOG) 100 units/mL subcutaneous injection 1-5 Units, 1-5 Units, Subcutaneous, 4x Daily (with meals and at bedtime) **AND** Fingerstick Glucose (POCT), , , 4x Daily AC and at bedtime, Sally Miller MD    multivitamin stress formula tablet 1 tablet, 1 tablet, Oral, Daily, Sally Miller MD    ondansetron (ZOFRAN) injection 4 mg, 4 mg, Intravenous, Q6H PRN, Sally Miller MD    sodium chloride 0.9 % infusion, 100 mL/hr, Intravenous, Continuous, Sally Miller MD    Current Outpatient Medications:     Calcium Carbonate-Vit D-Min (CALCIUM 1200 PO), Take by mouth, Disp: , Rfl:     cyclobenzaprine (FLEXERIL) 5 mg tablet, Take 1 tablet (5 mg total) by mouth daily at bedtime, Disp: 30 tablet, Rfl: 0    Ferrous Sulfate (SLOW FE PO), Take by mouth 2 (two) times a day, Disp: , Rfl:     glucose blood test strip, Check blood sugar once a day, Disp: 100 strip, Rfl: 5    Lancets (onetouch ultrasoft) lancets, Check blood sugar once a day, Disp: 100 each, Rfl: 5    metFORMIN (GLUCOPHAGE-XR) 500 mg 24 hr tablet, Take 1 tablet (500 mg total) by mouth 2 (two) times a day with meals, Disp: 60 tablet, Rfl: 0    multivitamin (THERAGRAN) TABS, Take 1 tablet by mouth daily, Disp: , Rfl:     naproxen (NAPROSYN) 500 mg tablet, Take 1 tab as needed every 12 hrs, Disp: 30 tablet, Rfl: 5    Cholecalciferol (Vitamin D3) 125 MCG (5000 UT) TABS, Take 5,000 Units by mouth every other day Alternating 5000 and 56147 every other day, Disp: , Rfl:     metFORMIN (GLUCOPHAGE-XR) 500 mg 24 hr tablet, TAKE ONE TABLET BY MOUTH TWICE A DAY WITH MEALS, Disp: 60 tablet, Rfl: 0    No Known Allergies    Review of Systems  Patient also  reports chronic cough for several months and states outpatient workup has not revealed a cause.  No fevers or chills.  No sore throat.  No chest pain or shortness of breath at rest.  Other review of systems essentially negative except as mention above.    Objective   Vitals: Blood pressure 152/63, pulse 95, temperature 97.9 °F (36.6 °C), temperature source Oral, resp. rate 18, SpO2 96%.    Physical Exam   General-awake alert, NAD  HEENT: PERRLA, EOMI, pale conjunctiva, sclera anicteric, moist mucous membranes, tongue mucosa without lesions.  Neck: supple, no JVD, lymphadenopathy, thyromegaly.  Heart: Regular rate and rhythm, S1S2 present.  No murmur, rub or gallop.    Lungs; Clear to auscultation bilaterally.  No wheezing, crackles or rhonchi.  No accessory muscle use or respiratory distress.  Abdomen: soft, non-tender, non-distended, NABS.  No guarding or rebound. No peritoneal sound or mass.  Extremities: She has bilateral lipedema.  No pitting edema.  2+ pedal pulses bilaterally.  Full range of motion.  Neurologic:  Cranial nerves II-XII intact.  Strength and sensation globally intact. Speech fluent and goal directed.  Awake, alert and oriented x 3.  Skin: warm and dry. No petechiae, purpura or rash.    Lab Results:   Results from last 7 days   Lab Units 12/29/23 2000   WBC Thousand/uL 8.58   HEMOGLOBIN g/dL 8.7*   HEMATOCRIT % 30.4*   PLATELETS Thousands/uL 445*     Results from last 7 days   Lab Units 12/29/23 2000   POTASSIUM mmol/L 4.0   CHLORIDE mmol/L 104   CO2 mmol/L 25   BUN mg/dL 11   CREATININE mg/dL 0.79   CALCIUM mg/dL 9.5         Imaging:  XR chest 1 view portable   ED Interpretation by Kristin Shirley MD (12/29 2108)   No infiltrate or pneumothorax. Independently interpreted by me.           EKG, Pathology, and Other Studies:  Normal sinus rhythm no acute ST-T wave changes        Code Status: Level 1 - Full Code      Counseling / Coordination of Care  Total floor / unit time spent today 70  "minutes.  Greater than 50% of total time was spent with the patient and / or family counseling and / or coordination of care.     Portions of the record may have been created with voice recognition software. Occasional wrong word or \"sound a like\" substitutions may have occurred due to the inherent limitations of voice recognition software. Read the chart carefully and recognize, using context, where substitutions have occurred.  "

## 2023-12-30 NOTE — TELEMEDICINE
e-Consult ([facility])   Rosalie Pelaez 68 y.o. female MRN: [id number]  Unit/Bed#: ED OVR 22 Encounter: [id number]    Referring Physician: [facility].  Reason for Consult / Principal Problem: Anemia.  Consults  12/30/23    ASSESSMENT:  Reviewed patient's records, and patient presented with dizziness. He has anemia, of which is chronic, and has documented iron deficiency anemia in 2022. Not sure if he has had GI evaluation, or prior iron therapy. New iron panel results are pending. Stool test for blood is pending. I would suggest complete GI evaluation. Oral or intravenous iron therapy. Monitoring patient's condition, blood counts, and iron panel. He could be seen in our office within 1-month of discharge.    RECOMMENDATIONS:  I would suggest complete GI evaluation. Oral or intravenous iron therapy. Monitoring patient's condition, blood counts, and iron panel. He could be seen in our office within 1-month of discharge.    Sent a TigerText to Rob Gross M.D., Felice Rojas M.D., and Juan Blanca M.D.    5-10 minutes, >50% of the total time devoted to medical consultative verbal/EMR discussion between providers. Written report will be generated in the EMR.    Te Green MD

## 2023-12-30 NOTE — ED ATTENDING ATTESTATION
12/29/2023  I, Kristin Shirley MD, saw and evaluated the patient. I have discussed the patient with the resident/non-physician practitioner and agree with the resident's/non-physician practitioner's findings, Plan of Care, and MDM as documented in the resident's/non-physician practitioner's note, except where noted. All available labs and Radiology studies were reviewed.  I was present for key portions of any procedure(s) performed by the resident/non-physician practitioner and I was immediately available to provide assistance.       At this point I agree with the current assessment done in the Emergency Department.  I have conducted an independent evaluation of this patient a history and physical is as follows:    68-year-old female with history of diabetes, anemia, hyperlipidemia who presents for evaluation of multiple symptoms.  Patient reports ongoing symptoms for the past several weeks including dyspnea on exertion, cough, and lightheadedness.  She notes that her symptoms are primarily prevalent with standing and exertion.  When she stands from sitting, she develops significant lightheadedness but has never passed out.  She occasionally has chest pain with her shortness of breath.  She has not had any fevers, nausea, vomiting, abdominal pain, diarrhea.  She has discussed her symptoms with her primary care physician but they have not yet found a reason for her symptoms.  She presents today because her symptoms have gotten to the point where she is unable to perform her ADLs and care for herself at home.    Exam: Awake, alert, no acute distress.  Head normocephalic and atraumatic.  Moist mucous membranes.  Normal conjunctiva.  Nose normal.  Neck supple with normal range of motion.  Heart tachycardic with regular rhythm.  No murmurs.  Lungs clear to auscultation bilaterally.  Abdomen soft, nontender, nondistended.  Bilateral lower extremities with 1+ nonpitting edema.  Range of motion of all extremities intact.   Skin without rashes.  No focal neurologic deficits.    68-year-old female presenting for evaluation of lightheadedness, dyspnea on exertion.  Vital signs stable on arrival apart from mild tachycardia.  Differential diagnoses include but not limited to ACS, PE, CHF, pneumonia, pneumothorax, electrolyte abnormality, symptomatic anemia, viral URI.  Labs significant for hemoglobin of 8.7 which is stable compared to prior.  Labs otherwise at baseline for patient.  Chest x-ray unremarkable.  D-dimer negative.  Cardiac workup unremarkable.  Patient with significant symptoms upon standing with hypoxia and tachycardia.  Given this, patient was discussed with Parvez and admitted to their service.    ED Course  ED Course as of 12/29/23 2301   Fri Dec 29, 2023   2024 Comprehensive metabolic panel   2028 D-Dimer, Quant: 0.43   2028 hs TnI 0hr: 2   2029 BNP: 53   2029 Procedure Note: EKG  Date/Time: 12/29/23 8:23 PM   Interpreted by: Kristin Shirley  Indications / Diagnosis: shortness of breath  ECG reviewed by me, the ED Provider: yes   The EKG demonstrates:  Rhythm: rate 99, normal sinus  Intervals: normal intervals  Axis: normal axis  QRS/Blocks: normal QRS  ST Changes: No acute ST Changes, no STD/EKTA.    2045 FLU/RSV/COVID - if FLU/RSV clinically relevant   2045 Patient was observed during orthostatic vital signs.  Upon standing, patient became significantly lightheaded with unsteadiness.  She became significantly tachycardic to 125 and did have a drop in her oxygen saturation to 87%.  She became significantly tachypneic as well.  Once patient was resting comfortably back in the stretcher, her oxygen saturation normalized, her lightheadedness resolved, and her tachypnea resolved as well.         Critical Care Time  Procedures

## 2023-12-30 NOTE — ASSESSMENT & PLAN NOTE
Patient with chronic anemia.  Workup underway.  Status post 1 unit packed red blood cells  Of note patient still feels dizzy.  She feels unsafe for discharge.

## 2023-12-30 NOTE — ASSESSMENT & PLAN NOTE
Patient states she has severe vitamin D deficiency  She currently takes 5000 IU alternating with 10,000 IU every other day  She will need to get a repeat vitamin D level as an outpatient  Continue same medication for now

## 2023-12-30 NOTE — ED NOTES
Pt was checked on, taken to the bathroom, given pillows and vitals were taken.      Fatoumata Cochran, CST  12/30/23 [id number]

## 2023-12-30 NOTE — ASSESSMENT & PLAN NOTE
Lab Results   Component Value Date    HGBA1C 6.5 (H) 11/18/2023       Recent Labs     12/30/23  0807   POCGLU 117       Blood Sugar Average: Last 72 hrs:  (P) 117  Last A1c 6.5    Sugars reviewed continue treatment

## 2023-12-30 NOTE — ED NOTES
Ortho Stats:     Lying- HR- 94, O2- 99, BP- 144/65    Sitting- HR- 103, O2- 97, BP- 139/70    Standing- HR- 125, O2- 87, BP- 127/58 (pt unsteady)     Radha Marmolejo  12/29/23 2034

## 2023-12-30 NOTE — ASSESSMENT & PLAN NOTE
"Lab Results   Component Value Date    HGBA1C 6.5 (H) 11/18/2023       No results for input(s): \"POCGLU\" in the last 72 hours.    Blood Sugar Average: Last 72 hrs:    Last A1c 6.5  Patient is on metformin  Will hold this for now  Continue with SSI and Accu-Cheks    "

## 2023-12-30 NOTE — UTILIZATION REVIEW
Initial Clinical Review    Admission: Date/Time/Statement:   Admission Orders (From admission, onward)       Ordered        12/29/23 2242  Inpatient Admission  Once                          Orders Placed This Encounter   Procedures    Inpatient Admission     Standing Status:   Standing     Number of Occurrences:   1     Order Specific Question:   Level of Care     Answer:   Med Surg [16]     Order Specific Question:   Estimated length of stay     Answer:   More than 2 Midnights     Order Specific Question:   Certification     Answer:   I certify that inpatient services are medically necessary for this patient for a duration of greater than two midnights. See H&P and MD Progress Notes for additional information about the patient's course of treatment.     ED Arrival Information       Expected   -    Arrival   12/29/2023 18:14    Acuity   Urgent              Means of arrival   Walk-In    Escorted by   Self    Service   Hospitalist    Admission type   Emergency              Arrival complaint   SOB, cough             Chief Complaint   Patient presents with    Shortness of Breath     Pt presents to the ED c/o SOB, cough, dizziness, and feeling unsteady. Pt is being treated by PCP for anemia       Initial Presentation:  68 yof to ER from home c/o worsening SOB, cough, dizziness, lightheadedness, room spinning, persistent nausea, decreased appetite x 2 months. Hx T2DM (on Metformin), Anemia, HLD, chronic diarrhea. Reports saw PCP for the symptoms on 12/12. She has a history of anemia, and her Hgb was 8.3 in 11/2023, after previously being stable around 10. Her PCP referred her to see a hematologist, which she has not yet seen. Presents tachycardic, hypertensive, s/s as above. Admission work-up showing Hgb 8.7.  Admitted to inpatient status for acute on chronic anemia.     Date: 12/30/23   Day 2:   Persistent dizziness. Anemia work-up in progress. Transfusion 1uprbc's given. Hematology consulted.    Per hematology:  presented with dizziness. He has anemia, of which is chronic, and has documented iron deficiency anemia in 2022. Not sure if he has had GI evaluation, or prior iron therapy. New iron panel results are pending. Stool test for blood is pending. I would suggest complete GI evaluation. Oral or intravenous iron therapy. Monitoring patient's condition, blood counts, and iron panel.     ED Triage Vitals   Temperature Pulse Respirations Blood Pressure SpO2   12/29/23 1945 12/29/23 1903 12/29/23 1903 12/29/23 1903 12/29/23 1903   98.2 °F (36.8 °C) (!) 113 17 (!) 175/77 99 %      Temp Source Heart Rate Source Patient Position - Orthostatic VS BP Location FiO2 (%)   12/29/23 1945 12/29/23 1903 12/29/23 1903 12/29/23 1903 --   Oral Monitor Sitting Left arm       Pain Score       12/29/23 2227       6          Wt Readings from Last 1 Encounters:   12/12/23 98.2 kg (216 lb 9.6 oz)     Additional Vital Signs:   Date/Time Temp Pulse Resp BP SpO2 O2 Device Patient Position - Orthostatic VS   12/30/23 0535 98.1 °F (36.7 °C) 94 16 147/64 98 % None (Room air) --   12/30/23 0410 97.9 °F (36.6 °C) 95 18 152/63 96 % None (Room air) Lying   12/30/23 0355 97.9 °F (36.6 °C) 93 16 164/68 97 % None (Room air) Lying   12/30/23 0340 97.8 °F (36.6 °C) 97 16 171/72 Abnormal  99 % None (Room air) Lying   12/30/23 0317 97.8 °F (36.6 °C) 102 16 160/66 99 % None (Room air) Lying   12/30/23 0301 -- 100 -- 139/63 98 % None (Room air) Lying   12/30/23 0230 -- 88 19 -- 96 % -- --   12/29/23 1945 98.2 °F (36.8 °C) 99 -- -- -- -- --   12/29/23 1903 -- 113 Abnormal  17 175/77 Abnormal  99 % None (Room air) Sitting     Pertinent Labs/Diagnostic Test Results:   XR chest 1 view portable   ED Interpretation  (12/29 2108)   No infiltrate or pneumothorax. Independently interpreted by me.      Final Result  (12/30 1505)      No acute cardiopulmonary disease.     12/29 EKG=  Rhythm: rate 99, normal sinus  Intervals: normal intervals  Axis: normal axis  QRS/Blocks:  "normal QRS  ST Changes: No acute ST Changes, no STD/EKTA.     Results from last 7 days   Lab Units 12/29/23 2000   SARS-COV-2  Negative     Results from last 7 days   Lab Units 12/31/23  0652 12/30/23  0755 12/29/23 2000   WBC Thousand/uL 6.85 7.97 8.58   HEMOGLOBIN g/dL 8.6* 9.2* 8.7*   HEMATOCRIT % 28.1* 30.1* 30.4*   PLATELETS Thousands/uL 361 408* 445*   NEUTROS ABS Thousands/µL  --  4.81 4.93         Results from last 7 days   Lab Units 12/30/23  0755 12/29/23 2000   SODIUM mmol/L 137 137   POTASSIUM mmol/L 3.8 4.0   CHLORIDE mmol/L 105 104   CO2 mmol/L 26 25   ANION GAP mmol/L 6 8   BUN mg/dL 10 11   CREATININE mg/dL 0.74 0.79   EGFR ml/min/1.73sq m 84 77   CALCIUM mg/dL 9.1 9.5     Results from last 7 days   Lab Units 12/30/23  0755 12/29/23 2000   AST U/L 25 30   ALT U/L 13 15   ALK PHOS U/L 87 98   TOTAL PROTEIN g/dL 6.8 7.4   ALBUMIN g/dL 3.6 3.9   TOTAL BILIRUBIN mg/dL 1.26* 0.44     Results from last 7 days   Lab Units 12/31/23  0726 12/30/23  2153 12/30/23  1649 12/30/23  1332 12/30/23  0807   POC GLUCOSE mg/dl 124 144* 150* 177* 117     Results from last 7 days   Lab Units 12/30/23  0755 12/29/23 2000   GLUCOSE RANDOM mg/dL 122 126             No results found for: \"BETA-HYDROXYBUTYRATE\"                   Results from last 7 days   Lab Units 12/31/23  0652 12/29/23  2242 12/29/23 2000   HS TNI 0HR ng/L  --   --  2   HS TNI 2HR ng/L  --  3  --    HSTNI D2 ng/L  --  1  --    HS TNI 4HR ng/L 3  --   --      Results from last 7 days   Lab Units 12/29/23 2000   D-DIMER QUANTITATIVE ug/ml FEU 0.43                             Results from last 7 days   Lab Units 12/29/23 2000   BNP pg/mL 53     Results from last 7 days   Lab Units 12/29/23 2000   FERRITIN ng/mL 7*   IRON SATURATION % 5*   IRON ug/dL 23*   TIBC ug/dL 437         Results from last 7 days   Lab Units 12/30/23  0630   UNIT PRODUCT CODE  [id number]   UNIT NUMBER  [id number]   UNITABO  O   UNITRH  POS   CROSSMATCH  Compatible   UNIT " DISPENSE STATUS  Presumed Trans   UNIT PRODUCT VOL ml 350                             Results from last 7 days   Lab Units 12/29/23 2000   INFLUENZA A PCR  Negative   INFLUENZA B PCR  Negative   RSV PCR  Negative                                               ED Treatment:   Medication Administration from 12/29/2023 1814 to 12/30/2023 0914         Date/Time Order Dose Route Action     12/29/2023 2227 EST acetaminophen (TYLENOL) tablet 650 mg 650 mg Oral Given     12/30/2023 0834 EST calcium carbonate-vitamin D 500 mg-5 mcg tablet 1 tablet 1 tablet Oral Given     12/30/2023 0831 EST cholecalciferol (VITAMIN D3) tablet 5,000 Units 5,000 Units Oral Given          Past Medical History:   Diagnosis Date    Diabetes mellitus (HCC)     Fibromyalgia     Hyperlipidemia      Present on Admission:   Vitamin D deficiency   Iron deficiency anemia   Primary osteoarthritis of both knees   Varicose veins of bilateral lower extremities with pain   Diabetes mellitus without complication (HCC)   Acute on chronic anemia   Dizziness      Admitting Diagnosis: SOB (shortness of breath) [R06.02]  Age/Sex: 68 y.o. female  Admission Orders:  Orthostatic VS  Accuchecks  Scd foot pumps  Consult hematology  [facility]    Scheduled Medications:  calcium carbonate-vitamin D, 1 tablet, Oral, BID With Meals  cholecalciferol, 5,000 Units, Oral, Daily  cyclobenzaprine, 5 mg, Oral, HS  ferrous sulfate, 325 mg, Oral, Daily With Breakfast  insulin lispro, 1-5 Units, Subcutaneous, 4x Daily (with meals and at bedtime)  multivitamin stress formula, 1 tablet, Oral, Daily    Continuous IV Infusions:  sodium chloride, 100 mL/hr, Intravenous, Continuous    PRN Meds:  acetaminophen, 650 mg, Oral, Q6H PRN  aluminum-magnesium hydroxide-simethicone, 30 mL, Oral, Q6H PRN  ondansetron, 4 mg, Intravenous, Q6H PRN    Network Utilization Review Department  ATTENTION: Please call with any questions or concerns to [phone] and carefully listen to the  prompts so that you are directed to the right person. All voicemails are confidential.   For Discharge needs, contact Care Management DC Support Team at [phone] opt. [phone]  Send all requests for admission clinical reviews, approved or denied determinations and any other requests to dedicated fax number below belonging to the [facility] where the patient is receiving treatment. List of dedicated fax numbers for the Facilities:  FACILITY NAME UR FAX NUMBER   ADMISSION DENIALS (Administrative/Medical Necessity) [phone]   DISCHARGE SUPPORT TEAM (NETWORK) [phone]   PARENT CHILD HEALTH (Maternity/NICU/Pediatrics) [phone]   [facility] [phone]   [facility] [phone]   [facility] [phone]   [facility] [phone]   [facility] [phone]   [facility] [phone]   [facility] [phone]   [facility] [phone]   [facility] [phone]   [facility] [phone]   [facility] [phone]

## 2023-12-30 NOTE — ED PROVIDER NOTES
History  Chief Complaint   Patient presents with    Shortness of Breath     Pt presents to the ED c/o SOB, cough, dizziness, and feeling unsteady. Pt is being treated by PCP for anemia     The patient is a 67 yo F with a PMHx T2DM, Anemia, and HLD presenting with shortness of breath, cough, and dizziness which have been progressively worsening for the past 2 months. She says that she went to see her PCP for the symptoms on 12/12. She has a history of anemia, and her Hgb was 8.3 in 11/2023, after previously being stable around 10. Her PCP referred her to see a hematologist, which she has not yet seen. Her symptoms have continued to gradually worsen, and today she felt that she could not manage them at home anymore while she waits for her hematology appointment, so she decided to come to the ED for evaluation. She has shortness of breath, palpitations, and dizziness when she goes from a sitting to standing position. She describes the dizziness as a lightheadedness, and like the room is spinning. She has generalized weakness, and feels unsteady while she walks. She has tripped twice in the past 2 months, but caught herself before falling both times. No syncope or loss of consciousness. She has had a persistent cough for several months, and says outpatient workup has not revealed a cause. The cough is productive of sputum in the mornings, and becomes dry throughout the day. She has also had persistent nausea and decreased appetite over the past 2 months. She takes Metformin BID for her diabetes, and believes that the medication may be contributing to her nausea. She eats breakfast and lunch, but does not eat dinner due to the nausea. She denies vomiting. She says she had a subjective fever and chills 2 days ago, which resolved with Tylenol. She takes imodium daily as a preventative due to chronic diarrhea. With the imodium, she has been having daily normal bowel movements. She denies melena or blood in the stool.        Shortness of Breath  Associated symptoms: fever    Associated symptoms: no abdominal pain, no chest pain, no headaches, no hemoptysis and no vomiting        Prior to Admission Medications   Prescriptions Last Dose Informant Patient Reported? Taking?   Calcium Carbonate-Vit D-Min (CALCIUM 1200 PO)   Yes Yes   Sig: Take by mouth   Cholecalciferol (Vitamin D3) 125 MCG (5000 UT) TABS   Yes No   Sig: Take 5,000 Units by mouth every other day Alternating 5000 and 29858 every other day   Ferrous Sulfate (SLOW FE PO)  Self Yes Yes   Sig: Take by mouth 2 (two) times a day   Lancets (onetouch ultrasoft) lancets   No Yes   Sig: Check blood sugar once a day   cyclobenzaprine (FLEXERIL) 5 mg tablet   No Yes   Sig: Take 1 tablet (5 mg total) by mouth daily at bedtime   glucose blood test strip   No Yes   Sig: Check blood sugar once a day   metFORMIN (GLUCOPHAGE-XR) 500 mg 24 hr tablet   No Yes   Sig: Take 1 tablet (500 mg total) by mouth 2 (two) times a day with meals   metFORMIN (GLUCOPHAGE-XR) 500 mg 24 hr tablet   No No   Sig: TAKE ONE TABLET BY MOUTH TWICE A DAY WITH MEALS   multivitamin (THERAGRAN) TABS  Self Yes Yes   Sig: Take 1 tablet by mouth daily   naproxen (NAPROSYN) 500 mg tablet   No Yes   Sig: Take 1 tab as needed every 12 hrs      Facility-Administered Medications: None       Past Medical History:   Diagnosis Date    Diabetes mellitus (HCC)     Fibromyalgia     Hyperlipidemia        Past Surgical History:   Procedure Laterality Date    CHOLECYSTECTOMY      GASTRIC BYPASS      MAMMO (HISTORICAL)  05/2012    MEDIASTINOSCOPY         Family History   Problem Relation Age of Onset    Hypertension Mother     Diabetes Mother     COPD Mother     Stroke Father     Heart disease Father     Diabetes Father     Hypertension Father     Hypertension Maternal Grandmother     Diabetes Maternal Aunt     Lung cancer Other 41     I have reviewed and agree with the history as documented.    E-Cigarette/Vaping    E-Cigarette Use  Never User      E-Cigarette/Vaping Substances    Nicotine No     THC No     CBD No     Flavoring No     Other No     Unknown No      Social History     Tobacco Use    Smoking status: Never    Smokeless tobacco: Never   Vaping Use    Vaping status: Never Used   Substance Use Topics    Alcohol use: Not Currently    Drug use: Not Currently     Comment: Hx: Over 30 years ago.         Review of Systems   Constitutional:  Positive for appetite change, chills, fatigue and fever.   HENT:  Negative for congestion, rhinorrhea and sinus pressure.    Respiratory:  Positive for chest tightness and shortness of breath. Negative for hemoptysis and choking.    Cardiovascular:  Positive for palpitations and leg swelling. Negative for chest pain.        Patient states that she always has leg swelling, and they are not any more swollen than usual.    Gastrointestinal:  Positive for nausea. Negative for abdominal pain, blood in stool, constipation, diarrhea and vomiting.   Genitourinary:  Negative for dysuria and hematuria.   Skin:  Positive for pallor.   Neurological:  Positive for dizziness, weakness and light-headedness. Negative for syncope and headaches.   Psychiatric/Behavioral:  Positive for sleep disturbance.    All other systems reviewed and are negative.      Physical Exam  ED Triage Vitals   Temperature Pulse Respirations Blood Pressure SpO2   12/29/23 1945 12/29/23 1903 12/29/23 1903 12/29/23 1903 12/29/23 1903   98.2 °F (36.8 °C) (!) 113 17 (!) 175/77 99 %      Temp Source Heart Rate Source Patient Position - Orthostatic VS BP Location FiO2 (%)   12/29/23 1945 12/29/23 1903 12/29/23 1903 12/29/23 1903 --   Oral Monitor Sitting Left arm       Pain Score       12/29/23 2227       6             Orthostatic Vital Signs  Vitals:    12/29/23 1903 12/29/23 1945   BP: (!) 175/77    Pulse: (!) 113 99   Patient Position - Orthostatic VS: Sitting        Physical Exam    ED Medications  Medications   acetaminophen (TYLENOL) tablet 650  mg (650 mg Oral Given 12/29/23 2227)       Diagnostic Studies  Results Reviewed       Procedure Component Value Units Date/Time    HS Troponin I 2hr [[id number]] Collected: 12/29/23 2242    Lab Status: In process Specimen: Blood from Hand, Right Updated: 12/29/23 2245    FLU/RSV/COVID - if FLU/RSV clinically relevant [[id number]]  (Normal) Collected: 12/29/23 2000    Lab Status: Final result Specimen: Nares from Nose Updated: 12/29/23 2042     SARS-CoV-2 Negative     INFLUENZA A PCR Negative     INFLUENZA B PCR Negative     RSV PCR Negative    Narrative:      FOR PEDIATRIC PATIENTS - copy/paste COVID Guidelines URL to browser: [email or web address]    SARS-CoV-2 assay is a Nucleic Acid Amplification assay intended for the  qualitative detection of nucleic acid from SARS-CoV-2 in nasopharyngeal  swabs. Results are for the presumptive identification of SARS-CoV-2 RNA.    Positive results are indicative of infection with SARS-CoV-2, the virus  causing COVID-19, but do not rule out bacterial infection or co-infection  with other viruses. Laboratories within the [place] and its  territories are required to report all positive results to the appropriate  public health authorities. Negative results do not preclude SARS-CoV-2  infection and should not be used as the sole basis for treatment or other  patient management decisions. Negative results must be combined with  clinical observations, patient history, and epidemiological information.  This test has not been FDA cleared or approved.    This test has been authorized by FDA under an Emergency Use Authorization  (EUA). This test is only authorized for the duration of time the  declaration that circumstances exist justifying the authorization of the  emergency use of an in vitro diagnostic tests for detection of SARS-CoV-2  virus and/or diagnosis of COVID-19 infection under section 564(b)(1) of  the Act, 21 U.S.C.  360bbb-3(b)(1), unless the authorization is terminated  or revoked sooner. The test has been validated but independent review by FDA  and CLIA is pending.    Test performed using [employer]: This RT-PCR assay targets N2,  a region unique to SARS-CoV-2. A conserved region in the E-gene was chosen  for pan-Sarbecovirus detection which includes SARS-CoV-2.    According to CMS-2020-01-R, this platform meets the definition of high-throughput technology.    B-Type Natriuretic Peptide(BNP) [[id number]]  (Normal) Collected: 12/29/23 2000    Lab Status: Final result Specimen: Blood from Arm, Left Updated: 12/29/23 2029     BNP 53 pg/mL     HS Troponin I 4hr [[id number]]     Lab Status: No result Specimen: Blood     D-Dimer [[id number]]  (Normal) Collected: 12/29/23 2000    Lab Status: Final result Specimen: Blood from Arm, Left Updated: 12/29/23 2028     D-Dimer, Quant 0.43 ug/ml FEU     Narrative:      In the evaluation for possible pulmonary embolism, in the appropriate (Well's Score of 4 or less) patient, the age adjusted d-dimer cutoff for this patient can be calculated as:    Age x 0.01 (in ug/mL) for Age-adjusted D-dimer exclusion threshold for a patient over 50 years.    HS Troponin 0hr (reflex protocol) [[id number]]  (Normal) Collected: 12/29/23 2000    Lab Status: Final result Specimen: Blood from Arm, Left Updated: 12/29/23 2028     hs TnI 0hr 2 ng/L     Comprehensive metabolic panel [[id number]] Collected: 12/29/23 2000    Lab Status: Final result Specimen: Blood from Arm, Left Updated: 12/29/23 2021     Sodium 137 mmol/L      Potassium 4.0 mmol/L      Chloride 104 mmol/L      CO2 25 mmol/L      ANION GAP 8 mmol/L      BUN 11 mg/dL      Creatinine 0.79 mg/dL      Glucose 126 mg/dL      Calcium 9.5 mg/dL      AST 30 U/L      ALT 15 U/L      Alkaline Phosphatase 98 U/L      Total Protein 7.4 g/dL      Albumin 3.9 g/dL      Total Bilirubin 0.44 mg/dL      eGFR 77 ml/min/1.73sq m     Narrative:      National Kidney  Disease Foundation guidelines for Chronic Kidney Disease (CKD):     Stage 1 with normal or high GFR (GFR > 90 mL/min/1.73 square meters)    Stage 2 Mild CKD (GFR = 60-89 mL/min/1.73 square meters)    Stage 3A Moderate CKD (GFR = 45-59 mL/min/1.73 square meters)    Stage 3B Moderate CKD (GFR = 30-44 mL/min/1.73 square meters)    Stage 4 Severe CKD (GFR = 15-29 mL/min/1.73 square meters)    Stage 5 End Stage CKD (GFR <15 mL/min/1.73 square meters)  Note: GFR calculation is accurate only with a steady state creatinine    CBC and differential [[id number]]  (Abnormal) Collected: 12/29/23 2000    Lab Status: Final result Specimen: Blood from Arm, Left Updated: 12/29/23 2005     WBC 8.58 Thousand/uL      RBC 3.64 Million/uL      Hemoglobin 8.7 g/dL      Hematocrit 30.4 %      MCV 84 fL      MCH 23.9 pg      MCHC 28.6 g/dL      RDW 17.9 %      MPV 10.1 fL      Platelets 445 Thousands/uL      nRBC 0 /100 WBCs      Neutrophils Relative 57 %      Immat GRANS % 0 %      Lymphocytes Relative 32 %      Monocytes Relative 7 %      Eosinophils Relative 3 %      Basophils Relative 1 %      Neutrophils Absolute 4.93 Thousands/µL      Immature Grans Absolute 0.02 Thousand/uL      Lymphocytes Absolute 2.76 Thousands/µL      Monocytes Absolute 0.60 Thousand/µL      Eosinophils Absolute 0.22 Thousand/µL      Basophils Absolute 0.05 Thousands/µL                    XR chest 1 view portable   ED Interpretation by Kristin Shirley MD (12/29 2108)   No infiltrate or pneumothorax. Independently interpreted by me.            Procedures  Procedures      ED Course  ED Course as of 12/29/23 2258   Fri Dec 29, 2023   2028 Hemoglobin(!): 8.7  Hgb stable from 8.3 on 11/18/23 2200 Discussed with [facility] attending Dr. Miller, and patient accepted for admission. [facility] attending requested blood consent and 1u PRBCs for symptomatic anemia. Consent obtained, and T&S and 1u PRBCs ordered.    2215 Patient has a frontal headache, 4/10 in intensity. Tylenol  ordered                             SBIRT 20yo+      Flowsheet Row Most Recent Value   Initial Alcohol Screen: US AUDIT-C     1. How often do you have a drink containing alcohol? 0 Filed at: 12/29/2023 1943   2. How many drinks containing alcohol do you have on a typical day you are drinking?  0 Filed at: 12/29/2023 1943   3b. FEMALE Any Age, or MALE 65+: How often do you have 4 or more drinks on one occassion? 0 Filed at: 12/29/2023 1943   Audit-C Score 0 Filed at: 12/29/2023 1943                  Medical Decision Making  The patient is a 69 yo F with a PMHx of T2DM and Anemia presenting with worsening SOB on exertion, lightheadedness, and gait instability x 2 months. She is currently being worked up outpatient for symptomatic anemia after her Hgb dropped from 10 in 2022 to 8.3 in Nov 2023 without any signs of bleeding. She came today to the ED due to her symptoms becoming unmanageable at home. She feels SOB and lightheaded every time she stands, and feels like the room is spinning. She has had 2 near-falls, but has been able to catch herself each time. No syncope or LOC. She feels unsteady on her feet due to the dizziness and generalized weakness, and has difficulty getting around because she feels she may fall. Her symptoms have been associated with palpitations when standing, cough, and nausea with decreased appetite. On presentation to the ED she was tachycardic to 116. Hgb was stable at 8.7. No ischemic changes on EKG and initial trop was 2. Ddimer normal, Covid/flu/RSV negative, and CXR wet read with no acute cardiopulmonary pathology. While attempting to take orthostatic vitals, upon standing the patient became tachycardic to 125, tachypnic and SpO2 dropped to 87%. She was lightheaded. When the lied back down, her symptoms resolved and her VS normalized. Differentials include symptomatic anemia, orthostatic hypotension, and vertigo. Less likely PNA, PE, or ACS. Discussed with [facility] attending, and patient  accepted for admission. Blood consent obtained and 1u ordered per request of [facility] attending for symptomatic anemia. Patient agreeable to plan.     Amount and/or Complexity of Data Reviewed  External Data Reviewed: labs.  Labs: ordered.  Radiology: ordered.          Disposition  Final diagnoses:   Dyspnea on minimal exertion   Cough   Gait instability   Orthostatic lightheadedness   Dizziness on standing   Anemia     Time reflects when diagnosis was documented in both MDM as applicable and the Disposition within this note       Time User Action Codes Description Comment    12/29/2023  8:51 PM Irma Riveraca Add [R06.09] Dyspnea on minimal exertion     12/29/2023  8:51 PM Nicole Nga Add [R05.9] Cough     12/29/2023  8:52 PM Nicole Nga Add [R26.81] Gait instability     12/29/2023  8:52 PM Nicole Nga Add [R42] Orthostatic lightheadedness     12/29/2023  8:52 PM Nicole Nga Add [R42] Dizziness on standing     12/29/2023  8:52 PM Nicole Nga Add [D64.9] Anemia           ED Disposition       ED Disposition   Admit    Condition   Stable    Date/Time   Fri Dec 29, 2023 2204    Comment   Case was discussed with IRVING and the patient's admission status was agreed to be Admission Status: inpatient status to the service of Dr. Miller .               Follow-up Information    None         Patient's Medications   Discharge Prescriptions    No medications on file     No discharge procedures on file.    PDMP Review       None             ED Provider  Attending physically available and evaluated Rosalie Pelaez. I managed the patient along with the ED Attending.    Electronically Signed by           Nga Rivera MD  12/29/23 2951

## 2023-12-30 NOTE — ASSESSMENT & PLAN NOTE
Continue with adequate analgesia for better pain control  She states she takes naproxen as needed.    Given her drop in hemoglobin of unclear etiology I will hold off on this for now

## 2023-12-30 NOTE — PROGRESS NOTES
[facility]  Progress Note  Name: Rosalie Pelaez I  MRN: [id number]  Unit/Bed#: ED OVR 22 I Date of Admission: 12/29/2023   Date of Service: 12/30/2023 I Hospital Day: 1    Assessment/Plan   Dizziness  Assessment & Plan  Patient complaining of dizziness especially with change in position or walking  She lives alone and feels unsafe for discharge at this time  Will get PT evaluation  Check orthostatics  I am not sure patient's anemia is contributed to her dizziness    * Acute on chronic anemia  Assessment & Plan  Patient with chronic anemia.  Workup underway.  Status post 1 unit packed red blood cells  Of note patient still feels dizzy.  She feels unsafe for discharge.      Diabetes mellitus without complication (HCC)  Assessment & Plan  Lab Results   Component Value Date    HGBA1C 6.5 (H) 11/18/2023       Recent Labs     12/30/23  0807   POCGLU 117       Blood Sugar Average: Last 72 hrs:  (P) 117  Last A1c 6.5    Sugars reviewed continue treatment      Vitamin D deficiency  Assessment & Plan  Continue outpatient vitamin D regimen  Outpatient follow-up for vitamin D levels           VTE Pharmacologic Prophylaxis:   Moderate Risk (Score 3-4) - Pharmacological DVT Prophylaxis Ordered: heparin.    Mobility:      Ambulate patient    Patient Centered Rounds: Patient holding the emergency room    Education and Discussions with Family / Patient: Patient declined call to .     Total Time Spent on Date of Encounter in care of patient:  mins. This time was spent on one or more of the following: performing physical exam; counseling and coordination of care; obtaining or reviewing history; documenting in the medical record; reviewing/ordering tests, medications or procedures; communicating with other healthcare professionals and discussing with patient's family/caregivers.    Current Length of Stay: 1 day(s)  Current Patient Status: Inpatient   Certification Statement: The patient will  "continue to require additional inpatient hospital stay due to PT evaluation for discharge planning  Discharge Plan: Anticipate discharge in 24-48 hrs to discharge location to be determined pending rehab evaluations.    Code Status: Level 1 - Full Code    Subjective:   \"I still feel dizzy\"    Objective:     Vitals:   Temp (24hrs), Av °F (36.7 °C), Min:97.8 °F (36.6 °C), Max:98.2 °F (36.8 °C)    Temp:  [97.8 °F (36.6 °C)-98.2 °F (36.8 °C)] 98.1 °F (36.7 °C)  HR:  [] 94  Resp:  [16-19] 16  BP: (139-175)/(63-77) 147/64  SpO2:  [96 %-99 %] 98 %  There is no height or weight on file to calculate BMI.     Input and Output Summary (last 24 hours):     Intake/Output Summary (Last 24 hours) at 2023 1117  Last data filed at 2023 0535  Gross per 24 hour   Intake 700 ml   Output --   Net 700 ml       Physical Exam:   Physical Exam  Constitutional:       General: She is not in acute distress.     Appearance: She is obese. She is not diaphoretic.   Eyes:      General: No scleral icterus.  Cardiovascular:      Rate and Rhythm: Normal rate and regular rhythm.   Pulmonary:      Effort: Pulmonary effort is normal. No respiratory distress.      Breath sounds: Normal breath sounds. No stridor. No wheezing or rales.   Abdominal:      General: There is no distension.      Palpations: Abdomen is soft.      Tenderness: There is no abdominal tenderness. There is no guarding or rebound.      Hernia: No hernia is present.   Musculoskeletal:      Right lower leg: No edema.      Left lower leg: No edema.   Skin:     Coloration: Skin is pale.   Neurological:      Mental Status: She is alert.          Additional Data:     Labs:  Results from last 7 days   Lab Units 23  0755   WBC Thousand/uL 7.97   HEMOGLOBIN g/dL 9.2*   HEMATOCRIT % 30.1*   PLATELETS Thousands/uL 408*   NEUTROS PCT % 60   LYMPHS PCT % 30   MONOS PCT % 7   EOS PCT % 2     Results from last 7 days   Lab Units 23  0755   SODIUM mmol/L 137 "   POTASSIUM mmol/L 3.8   CHLORIDE mmol/L 105   CO2 mmol/L 26   BUN mg/dL 10   CREATININE mg/dL 0.74   ANION GAP mmol/L 6   CALCIUM mg/dL 9.1   ALBUMIN g/dL 3.6   TOTAL BILIRUBIN mg/dL 1.26*   ALK PHOS U/L 87   ALT U/L 13   AST U/L 25   GLUCOSE RANDOM mg/dL 122         Results from last 7 days   Lab Units 12/30/23  0807   POC GLUCOSE mg/dl 117               Lines/Drains:  Invasive Devices       Peripheral Intravenous Line  Duration             Peripheral IV 12/29/23 Dorsal (posterior);Right Hand <1 day    Peripheral IV 12/29/23 Left;Ventral (anterior) Forearm <1 day                          Imaging: Personally reviewed the following imaging: chest xray    Recent Cultures (last 7 days):         Last 24 Hours Medication List:   Current Facility-Administered Medications   Medication Dose Route Frequency Provider Last Rate    acetaminophen  650 mg Oral Q6H PRN Felice Rojas      aluminum-magnesium hydroxide-simethicone  30 mL Oral Q6H PRN Sally Miller MD      calcium carbonate-vitamin D  1 tablet Oral BID With Meals Sally Miller MD      cholecalciferol  5,000 Units Oral Daily Sally Miller MD      cyclobenzaprine  5 mg Oral HS Sally Miller MD      ferrous sulfate  325 mg Oral Daily With Breakfast Sally Miller MD      insulin lispro  1-5 Units Subcutaneous 4x Daily (with meals and at bedtime) Sally Miller MD      multivitamin stress formula  1 tablet Oral Daily Sally Miller MD      ondansetron  4 mg Intravenous Q6H PRN Sally Miller MD      sodium chloride  100 mL/hr Intravenous Continuous Sally Miller MD          Today, Patient Was Seen By: Rob Barriga MD    **Please Note: This note may have been constructed using a voice recognition system.**

## 2023-12-30 NOTE — ASSESSMENT & PLAN NOTE
Patient complaining of dizziness especially with change in position or walking  She lives alone and feels unsafe for discharge at this time  Will get PT evaluation  Check orthostatics  I am not sure patient's anemia is contributed to her dizziness

## 2023-12-31 VITALS
TEMPERATURE: 98.2 F | HEART RATE: 80 BPM | SYSTOLIC BLOOD PRESSURE: 145 MMHG | OXYGEN SATURATION: 92 % | DIASTOLIC BLOOD PRESSURE: 69 MMHG | RESPIRATION RATE: 15 BRPM

## 2023-12-31 LAB
ABO GROUP BLD: NORMAL
CARDIAC TROPONIN I PNL SERPL HS: 3 NG/L
ERYTHROCYTE [DISTWIDTH] IN BLOOD BY AUTOMATED COUNT: 17.6 % (ref 11.6–15.1)
GLUCOSE SERPL-MCNC: 124 MG/DL (ref 65–140)
HCT VFR BLD AUTO: 28.1 % (ref 34.8–46.1)
HGB BLD-MCNC: 8.6 G/DL (ref 11.5–15.4)
MCH RBC QN AUTO: 24.8 PG (ref 26.8–34.3)
MCHC RBC AUTO-ENTMCNC: 30.6 G/DL (ref 31.4–37.4)
MCV RBC AUTO: 81 FL (ref 82–98)
PLATELET # BLD AUTO: 361 THOUSANDS/UL (ref 149–390)
PMV BLD AUTO: 9.8 FL (ref 8.9–12.7)
RBC # BLD AUTO: 3.47 MILLION/UL (ref 3.81–5.12)
RH BLD: POSITIVE
WBC # BLD AUTO: 6.85 THOUSAND/UL (ref 4.31–10.16)

## 2023-12-31 PROCEDURE — 99238 HOSP IP/OBS DSCHRG MGMT 30/<: CPT | Performed by: INTERNAL MEDICINE

## 2023-12-31 PROCEDURE — 82948 REAGENT STRIP/BLOOD GLUCOSE: CPT

## 2023-12-31 PROCEDURE — 36415 COLL VENOUS BLD VENIPUNCTURE: CPT

## 2023-12-31 PROCEDURE — 84484 ASSAY OF TROPONIN QUANT: CPT

## 2023-12-31 PROCEDURE — 85027 COMPLETE CBC AUTOMATED: CPT | Performed by: INTERNAL MEDICINE

## 2023-12-31 RX ORDER — CYANOCOBALAMIN (VITAMIN B-12) 500 MCG
500 TABLET ORAL DAILY
Qty: 30 TABLET | Refills: 0 | Status: SHIPPED | OUTPATIENT
Start: 2023-12-31 | End: 2024-01-30

## 2023-12-31 RX ORDER — FERROUS SULFATE 325(65) MG
325 TABLET ORAL
Qty: 90 TABLET | Refills: 0 | Status: SHIPPED | OUTPATIENT
Start: 2023-12-31 | End: 2024-01-30

## 2023-12-31 RX ORDER — CYANOCOBALAMIN 1000 UG/ML
1000 INJECTION, SOLUTION INTRAMUSCULAR; SUBCUTANEOUS ONCE
Status: COMPLETED | OUTPATIENT
Start: 2023-12-31 | End: 2023-12-31

## 2023-12-31 RX ADMIN — CYANOCOBALAMIN 1000 MCG: 1000 INJECTION, SOLUTION INTRAMUSCULAR; SUBCUTANEOUS at 08:58

## 2023-12-31 RX ADMIN — B-COMPLEX W/ C & FOLIC ACID TAB 1 TABLET: TAB at 08:59

## 2023-12-31 RX ADMIN — Medication 5000 UNITS: at 09:03

## 2023-12-31 RX ADMIN — FERROUS SULFATE TAB 325 MG (65 MG ELEMENTAL FE) 325 MG: 325 (65 FE) TAB at 08:58

## 2023-12-31 RX ADMIN — HEPARIN SODIUM 5000 UNITS: 5000 INJECTION INTRAVENOUS; SUBCUTANEOUS at 06:30

## 2023-12-31 RX ADMIN — ACETAMINOPHEN 650 MG: 325 TABLET, FILM COATED ORAL at 02:09

## 2023-12-31 RX ADMIN — Medication 1 TABLET: at 08:59

## 2023-12-31 NOTE — DISCHARGE SUMMARY
[facility]  Discharge- Rosalie Pelaez 1955, 68 y.o. female MRN: [id number]  Unit/Bed#: ED OVR 22 Encounter: [id number]  Primary Care Provider: Juan Blanca MD   Date and time admitted to hospital: 12/29/2023  6:52 PM    Dizziness  Assessment & Plan  Improved    * Acute on chronic anemia  Assessment & Plan  Patient feeling better today after transfusion  I had a long conversation with patient regarding her iron deficiency anemia  Patient also with a low B12 level which may explain why anemia is normocytic.  B12 supplementation recommended after discharge      Diabetes mellitus without complication (HCC)  Assessment & Plan  Lab Results   Component Value Date    HGBA1C 6.5 (H) 11/18/2023       Recent Labs     12/30/23  1332 12/30/23  1649 12/30/23  2153 12/31/23  0726   POCGLU 177* 150* 144* 124         Blood Sugar Average: Last 72 hrs:  (P) 142.4  Last A1c 6.5    Sugars reviewed continue treatment  Restart metformin on discharge          Medical Problems       Resolved Problems  Date Reviewed: 12/31/2023   None       Discharging Physician / Practitioner: Rob Barriga MD  PCP: Juan Blanca MD  Admission Date:   Admission Orders (From admission, onward)       Ordered        12/29/23 2242  Inpatient Admission  Once                          Discharge Date: 12/31/23    Consultations During Hospital Stay:  Hematology    Procedures Performed:   Chest x-ray no acute cardiopulmonary disease    Significant Findings / Test Results:   Iron deficiency and low B12    Incidental Findings:   None    Test Results Pending at Discharge (will require follow up):   None     Outpatient Tests Requested:  Referral to GI for evaluation to rule out GI malignancy as source of iron deficiency anemia    Complications: None    Reason for Admission: Dizzy    Hospital Course:   Rosalie Pelaez is a 68 y.o. female patient who originally presented to the hospital on 12/29/2023 due to feeling dizzy.  Patient was  admitted to hospital for feeling dizzy.  Noted to have normocytic anemia.  Workup revealed a low ferritin and serum iron with low B12.  Patient being given B12 and supplementation for iron orally.  B12 IM will be given once prior to discharge.  I will conversation regarding the need for GI workup for her iron deficiency anemia to rule out malignancy.  Patient has full understanding.  She is already been referred to GI for colonoscopy by her primary care physician but has yet to make that appointment.  I advised patient to call and make follow-up this week.        Please see above list of diagnoses and related plan for additional information.     Condition at Discharge: good    Discharge Day Visit / Exam:   Subjective: I feel better  Vitals: Blood Pressure: 145/69 (12/31/23 0100)  Pulse: 80 (12/31/23 0100)  Temperature: 98.2 °F (36.8 °C) (12/31/23 0100)  Temp Source: Oral (12/31/23 0100)  Respirations: 15 (12/31/23 0100)  SpO2: 92 % (12/31/23 0100)  Exam:   Physical Exam  Constitutional:       General: She is not in acute distress.     Appearance: She is not diaphoretic.   HENT:      Head: Normocephalic and atraumatic.   Eyes:      General: No scleral icterus.  Cardiovascular:      Rate and Rhythm: Normal rate and regular rhythm.      Pulses: Normal pulses.      Heart sounds: Normal heart sounds. No murmur heard.     No friction rub. No gallop.   Pulmonary:      Effort: No respiratory distress.      Breath sounds: No stridor. No wheezing, rhonchi or rales.   Abdominal:      General: There is no distension.      Palpations: Abdomen is soft.      Tenderness: There is no abdominal tenderness. There is no guarding or rebound.   Musculoskeletal:      Right lower leg: No edema.      Left lower leg: No edema.   Psychiatric:         Mood and Affect: Mood normal.         Behavior: Behavior normal.         Judgment: Judgment normal.          Discussion with Family: Patient declined call to .     Discharge  instructions/Information to patient and family:   See after visit summary for information provided to patient and family.      Provisions for Follow-Up Care:  See after visit summary for information related to follow-up care and any pertinent home health orders.      Mobility at time of Discharge:   Basic Mobility Inpatient Raw Score: 24  JH-HLM Goal: 8: Walk 250 feet or more  JH-HLM Achieved: 7: Walk 25 feet or more  Patient now at baseline     Disposition:   Home    Planned Readmission: no     Discharge Statement:  I spent 25 minutes discharging the patient. This time was spent on the day of discharge. I had direct contact with the patient on the day of discharge. Greater than 50% of the total time was spent examining patient, answering all patient questions, arranging and discussing plan of care with patient as well as directly providing post-discharge instructions.  Additional time then spent on discharge activities.    Discharge Medications:  See after visit summary for reconciled discharge medications provided to patient and/or family.      **Please Note: This note may have been constructed using a voice recognition system**

## 2023-12-31 NOTE — ASSESSMENT & PLAN NOTE
Lab Results   Component Value Date    HGBA1C 6.5 (H) 11/18/2023       Recent Labs     12/30/23  1332 12/30/23  1649 12/30/23  2153 12/31/23  0726   POCGLU 177* 150* 144* 124         Blood Sugar Average: Last 72 hrs:  (P) 142.4  Last A1c 6.5    Sugars reviewed continue treatment  Restart metformin on discharge

## 2023-12-31 NOTE — ASSESSMENT & PLAN NOTE
Patient feeling better today after transfusion  I had a long conversation with patient regarding her iron deficiency anemia  Patient also with a low B12 level which may explain why anemia is normocytic.  B12 supplementation recommended after discharge

## 2023-12-31 NOTE — DISCHARGE INSTR - AVS FIRST PAGE
Make an appointment to see gastroenterology and get your colonoscopy as you were recently referred by your primary care doctor .  You need a full workup with gastroenterology to ensure you do not have some form of cancer

## 2023-12-31 NOTE — PLAN OF CARE
Problem: SAFETY ADULT  Goal: Patient will remain free of falls  Description: INTERVENTIONS:  - Educate patient/family on patient safety including physical limitations  - Instruct patient to call for assistance with activity   - Consult OT/PT to assist with strengthening/mobility   - Keep Call bell within reach  - Keep bed low and locked with side rails adjusted as appropriate  - Keep care items and personal belongings within reach  - Initiate and maintain comfort rounds  - Make Fall Risk Sign visible to staff  - Offer Toileting every *** Hours, in advance of need  - Initiate/Maintain ***alarm  - Obtain necessary fall risk management equipment: ***  - Apply yellow socks and bracelet for high fall risk patients  - Consider moving patient to room near nurses station  Outcome: Progressing  Goal: Maintain or return to baseline ADL function  Description: INTERVENTIONS:  -  Assess patient's ability to carry out ADLs; assess patient's baseline for ADL function and identify physical deficits which impact ability to perform ADLs (bathing, care of mouth/teeth, toileting, grooming, dressing, etc.)  - Assess/evaluate cause of self-care deficits   - Assess range of motion  - Assess patient's mobility; develop plan if impaired  - Assess patient's need for assistive devices and provide as appropriate  - Encourage maximum independence but intervene and supervise when necessary  - Involve family in performance of ADLs  - Assess for home care needs following discharge   - Consider OT consult to assist with ADL evaluation and planning for discharge  - Provide patient education as appropriate  Outcome: Progressing  Goal: Maintains/Returns to pre admission functional level  Description: INTERVENTIONS:  - Perform AM-PAC 6 Click Basic Mobility/ Daily Activity assessment daily.  - Set and communicate daily mobility goal to care team and patient/family/caregiver.   - Collaborate with rehabilitation services on mobility goals if  consulted  - Perform Range of Motion *** times a day.  - Reposition patient every *** hours.  - Dangle patient *** times a day  - Stand patient *** times a day  - Ambulate patient *** times a day  - Out of bed to chair *** times a day   - Out of bed for meals *** times a day  - Out of bed for toileting  - Record patient progress and toleration of activity level   Outcome: Progressing     Problem: DISCHARGE PLANNING  Goal: Discharge to home or other facility with appropriate resources  Description: INTERVENTIONS:  - Identify barriers to discharge w/patient and caregiver  - Arrange for needed discharge resources and transportation as appropriate  - Identify discharge learning needs (meds, wound care, etc.)  - Arrange for interpretive services to assist at discharge as needed  - Refer to Case Management Department for coordinating discharge planning if the patient needs post-hospital services based on physician/advanced practitioner order or complex needs related to functional status, cognitive ability, or social support system  Outcome: Progressing     Problem: Knowledge Deficit  Goal: Patient/family/caregiver demonstrates understanding of disease process, treatment plan, medications, and discharge instructions  Description: Complete learning assessment and assess knowledge base.  Interventions:  - Provide teaching at level of understanding  - Provide teaching via preferred learning methods  Outcome: Progressing

## 2024-01-02 ENCOUNTER — TRANSITIONAL CARE MANAGEMENT (OUTPATIENT)
Dept: FAMILY MEDICINE CLINIC | Facility: CLINIC | Age: 69
End: 2024-01-02

## 2024-01-02 NOTE — UTILIZATION REVIEW
NOTIFICATION OF INPATIENT ADMISSION   AUTHORIZATION REQUEST   SERVICING FACILITY:   [place] [address]  Tax ID: [id number]  NPI: [id number] ATTENDING PROVIDER:  Attending Name and NPI#: Felice Rojas [[id number]]  Address: [address] [place] [address]  Phone:  [phone]     ADMISSION INFORMATION:  Place of Service: Inpatient [facility]  Place of Service Code: 21  Inpatient Admission Date/Time: 12/29/23 10:42 PM  Discharge Date/Time: 12/31/2023 10:57 AM  Admitting Diagnosis Code/Description:  SOB (shortness of breath) [R06.02]     UTILIZATION REVIEW CONTACT:  Amy Hammond, Utilization   Network Utilization Review Department  Phone: [phone]  Fax: [phone]  Email: [email or web address]  Contact for approvals/pending authorizations, clinical reviews, and discharge.     PHYSICIAN ADVISORY SERVICES:  Medical Necessity Denial & Apne-iw-Mopj Review  Phone: [phone]  Fax: [phone]  Email: [email or web address]     DISCHARGE SUPPORT TEAM:  For Patients Discharge Needs & Updates  Phone: [phone] opt. 2 Fax: [phone]  Email: [email or web address]

## 2024-01-03 ENCOUNTER — OFFICE VISIT (OUTPATIENT)
Dept: HEMATOLOGY ONCOLOGY | Facility: CLINIC | Age: 69
End: 2024-01-03
Payer: COMMERCIAL

## 2024-01-03 ENCOUNTER — TELEPHONE (OUTPATIENT)
Dept: HEMATOLOGY ONCOLOGY | Facility: CLINIC | Age: 69
End: 2024-01-03

## 2024-01-03 VITALS
RESPIRATION RATE: 18 BRPM | DIASTOLIC BLOOD PRESSURE: 86 MMHG | TEMPERATURE: 97.8 F | BODY MASS INDEX: 41.12 KG/M2 | WEIGHT: 204 LBS | HEIGHT: 59 IN | SYSTOLIC BLOOD PRESSURE: 132 MMHG | OXYGEN SATURATION: 99 % | HEART RATE: 89 BPM

## 2024-01-03 DIAGNOSIS — E55.9 VITAMIN D DEFICIENCY: ICD-10-CM

## 2024-01-03 DIAGNOSIS — Z98.84 H/O GASTRIC BYPASS: ICD-10-CM

## 2024-01-03 DIAGNOSIS — E53.8 VITAMIN B12 DEFICIENCY: Primary | ICD-10-CM

## 2024-01-03 DIAGNOSIS — D64.9 ACUTE ON CHRONIC ANEMIA: ICD-10-CM

## 2024-01-03 DIAGNOSIS — D50.8 OTHER IRON DEFICIENCY ANEMIA: ICD-10-CM

## 2024-01-03 DIAGNOSIS — D50.0 IRON DEFICIENCY ANEMIA DUE TO CHRONIC BLOOD LOSS: Primary | ICD-10-CM

## 2024-01-03 PROCEDURE — 99215 OFFICE O/P EST HI 40 MIN: CPT | Performed by: INTERNAL MEDICINE

## 2024-01-03 RX ORDER — SODIUM CHLORIDE 9 MG/ML
20 INJECTION, SOLUTION INTRAVENOUS ONCE
OUTPATIENT
Start: 2024-01-03

## 2024-01-03 RX ORDER — CYANOCOBALAMIN 1000 UG/ML
1000 INJECTION, SOLUTION INTRAMUSCULAR; SUBCUTANEOUS ONCE
OUTPATIENT
Start: 2024-01-03 | End: 2024-01-03

## 2024-01-03 NOTE — PROGRESS NOTES
Rosalie Pelaez  1955  [address] [facility] [place]  [address]  [place] [address]    Chief Complaint   Patient presents with    Consult     Assessment/plan:   1.  Microcytic anemia: Secondary to iron deficiency  2.  Vitamin B12 deficiency  3.  History of gastric bypass surgery    Previous hematologic treatment:  1 unit PRBC transfusion while hospitalized 12/29/2023  Vitamin B12 1000 mcg while hospitalized 12/31/2023  Recommend Venofer 200 mg x 5 and vitamin B12 1000 mcg weekly x 4 then monthly    Rosalie Pelaez is a 68-year-old female with past medical history significant for hypercholesterolemia, fibromyalgia, vitamin D deficiency, osteopenia and diabetes.  She has a history of gastric bypass surgery.  Patient following with PCP in December 2023 and noted to have hemoglobin decreased to 8.3 g/deciliter in November 2023.  She was told to increase iron pills to twice daily and referred to hematology and GI.  In the interim, patient was patient presented to the ER with dizziness and symptomatic anemia.  She received 1 unit PRBC transfusion.  Hemoglobin on discharge on 12/31/2023 was 8.6 g/deciliter.  Her vitamin B12 levels 151 and patient received 1 vitamin B12 IM injection and recommended oral supplementation.    I met with the patient and reviewed her history, chart, labs and exam.  Patient noted to have iron deficiency anemia likely multifactorial due to history of gastric bypass.  She was previously taking oral iron supplementation with suboptimal response.  At this juncture, we discussed IV iron supplementation. Recommend IV Venofer 200 mg x5 infusions.  Recommend vitamin B12 1000 mcg injection weekly x 4 then monthly.  Recommend she complete GI evaluation.  I have advised patient to repeat CBC prior to iron infusion to see increase after PRBC transfusion.             History of present illness:   Rosalie Pelaez is a 68-year-old female with past medical  history significant for hypercholesterolemia, fibromyalgia, vitamin D deficiency, osteopenia and diabetes.  She has a history of gastric bypass surgery.  Patient following with PCP in December 2023 and noted to have hemoglobin decreased to 8.3 g/deciliter in November 2023.  She was told to increase iron pills to twice daily and referred to hematology and GI.  In the interim, patient was patient presented to the ER with dizziness and symptomatic anemia.  She received 1 unit PRBC transfusion.  Hemoglobin on discharge on 12/31/2023 was 8.6 g/deciliter.  Her vitamin B12 levels 151 and patient received 1 vitamin B12 IM injection and recommended oral supplementation.  She was evaluated in the hospital by hematology (Dr. Green).  Patient notes previously following with hematologist in [place] and has received IV iron in the past (records not available)    She presents for follow-up visit today.  She is currently taking oral iron twice daily which she increased in December 2023.  Prior to that, she was taking Slow Fe for several years.  Patient notes taking naproxen daily for over 2 years for back pain.  Patient notes improvement in her breathing and dizziness after PRBC transfusion in the hospital.  She denies any bleeding.    Review of systems:   Review of Systems   Constitutional:  Positive for fatigue. Negative for chills and fever.   Eyes:  Negative for pain and visual disturbance.   Respiratory:  Negative for cough and shortness of breath.    Cardiovascular:  Negative for chest pain and palpitations.   Gastrointestinal:  Negative for abdominal pain, blood in stool, constipation and vomiting.   Genitourinary:  Negative for dysuria and hematuria.   Musculoskeletal:  Negative for arthralgias and back pain.   Skin:  Negative for color change and rash.   Neurological:  Negative for seizures and syncope.   Hematological:  Does not bruise/bleed easily.   All other systems reviewed and are negative.    Patient Active Problem  List   Diagnosis    Hypercholesterolemia    Fibromyalgia    Vitamin D deficiency    Iron deficiency anemia    Primary osteoarthritis of both knees    Chronic bilateral low back pain without sciatica    Varicose veins of bilateral lower extremities with pain    Headache    Encounter for annual physical exam    Multiple joint pain    Acute on chronic anemia    Morbid obesity due to excess calories (HCC)    Diabetes mellitus without complication (HCC)    Osteopenia    Dizziness     Past Medical History:   Diagnosis Date    Diabetes mellitus (HCC)     Fibromyalgia     Hyperlipidemia      Past Surgical History:   Procedure Laterality Date    CHOLECYSTECTOMY      GASTRIC BYPASS      MAMMO (HISTORICAL)  2012    MEDIASTINOSCOPY       Family History   Problem Relation Age of Onset    Hypertension Mother     Diabetes Mother     COPD Mother     Stroke Father     Heart disease Father     Diabetes Father     Hypertension Father     Hypertension Maternal Grandmother     Diabetes Maternal Aunt     Lung cancer Other 41     Social History     Socioeconomic History    Marital status: Legally      Spouse name: Not on file    Number of children: Not on file    Years of education: Not on file    Highest education level: Not on file   Occupational History    Not on file   Tobacco Use    Smoking status: Never    Smokeless tobacco: Never   Vaping Use    Vaping status: Never Used   Substance and Sexual Activity    Alcohol use: Not Currently    Drug use: Not Currently     Comment: Hx: Over 30 years ago.     Sexual activity: Not Currently   Other Topics Concern    Not on file   Social History Narrative    [place]:    Most recent tobacco use screenin2018    Do you currently or have you served in the [employer]: No    Alcohol intake: None    Illicit drugs: over 30 years ago     Social Determinants of Health     Financial Resource Strain: Not on file   Food Insecurity: Not on file   Transportation Needs: Not on file    Physical Activity: Not on file   Stress: Not on file   Social Connections: Not on file   Intimate Partner Violence: Not on file   Housing Stability: Not on file       Current Outpatient Medications:     Calcium Carbonate-Vit D-Min (CALCIUM 1200 PO), Take by mouth, Disp: , Rfl:     Cholecalciferol (Vitamin D3) 125 MCG (5000 UT) TABS, Take 5,000 Units by mouth every other day Alternating 5000 and 06442 every other day, Disp: , Rfl:     cyclobenzaprine (FLEXERIL) 5 mg tablet, Take 1 tablet (5 mg total) by mouth daily at bedtime, Disp: 30 tablet, Rfl: 0    ferrous sulfate 325 (65 Fe) mg tablet, Take 1 tablet (325 mg total) by mouth 3 (three) times a day with meals, Disp: 90 tablet, Rfl: 0    glucose blood test strip, Check blood sugar once a day, Disp: 100 strip, Rfl: 5    Lancets (onetouch ultrasoft) lancets, Check blood sugar once a day, Disp: 100 each, Rfl: 5    metFORMIN (GLUCOPHAGE-XR) 500 mg 24 hr tablet, Take 1 tablet (500 mg total) by mouth 2 (two) times a day with meals, Disp: 60 tablet, Rfl: 0    multivitamin (THERAGRAN) TABS, Take 1 tablet by mouth daily, Disp: , Rfl:     vitamin B-12 (CYANOCOBALAMIN) 500 MCG TABS, Take 1 tablet (500 mcg total) by mouth daily, Disp: 30 tablet, Rfl: 0  No Known Allergies    Vitals:    01/03/24 1503   BP: 132/86   Pulse: 89   Resp: 18   Temp: 97.8 °F (36.6 °C)   SpO2: 99%     Wt Readings from Last 3 Encounters:   01/03/24 92.5 kg (204 lb)   12/12/23 98.2 kg (216 lb 9.6 oz)   10/13/22 101 kg (223 lb)     Physical Exam  Vitals reviewed.   Constitutional:       General: She is not in acute distress.     Appearance: Normal appearance.   HENT:      Head: Normocephalic and atraumatic.      Mouth/Throat:      Mouth: Mucous membranes are moist.   Eyes:      Extraocular Movements: Extraocular movements intact.      Conjunctiva/sclera: Conjunctivae normal.   Cardiovascular:      Rate and Rhythm: Normal rate.   Pulmonary:      Effort: Pulmonary effort is normal. No respiratory distress.       Breath sounds: No wheezing, rhonchi or rales.   Abdominal:      General: Abdomen is flat. There is no distension.      Palpations: Abdomen is soft.   Musculoskeletal:      Cervical back: Normal range of motion and neck supple.      Right lower leg: No edema.      Left lower leg: No edema.   Skin:     General: Skin is warm.      Coloration: Skin is pale.   Neurological:      Mental Status: She is alert and oriented to person, place, and time. Mental status is at baseline.      Cranial Nerves: No cranial nerve deficit.   Psychiatric:         Thought Content: Thought content normal.       Labs:  8/14/2020: WBC: 6.25, H/H: 12.4/38.9, MCV: 97, platelets: 270  3/23/2021: WBC: 5.23, H/H: 10.9/35.8, MCV: 97, platelets: 358  12/29/2023: Ferritin: 7, iron: 23, iron saturation: 5%, TIBC: 437  12/30/2023: Vitamin B12: 151, folate: 10.7  12/31/2023: WBC: 6.85, H/H: 8.6/28.1, MCV: 81, platelets: 361    Orders entered this encounter:  - Ambulatory Referral to Hematology / Oncology  - CBC and differential; Future  - CBC and differential; Future  - Iron Panel (Includes Ferritin, Iron Sat%, Iron, and TIBC); Future  - Vitamin B12; Future  - Folate; Future  - Methylmalonic acid, serum; Future    Return in about 3 months (around 4/3/2024) for Office Visit, Infusion - See Treatment Plan, Labs - See Treatment Plan.  1.  Check CBC now  2.  Venofer x 5 infusions  3.  Vitamin B12 injections weekly x 4 then monthly  4.  FU in 3 months with labs prior  5.  FU with GI

## 2024-01-03 NOTE — TELEPHONE ENCOUNTER
Please schedule patient for  Venofer x 5 infusions and Vitamin B12 injections weekly x 4 then monthly at [place]. Thank you

## 2024-01-04 NOTE — TELEPHONE ENCOUNTER
What would be a preferred day of the week that would work best for your infusion appointment? Thursdays  Do you prefer mornings or afternoons for your appointments? After 1pm  Are there any days or dates that do not work for your schedule, including any upcoming vacations? N/a  We are going to try our best to schedule you at the infusion center closest to your home.  In the event that we are unable to what would be your next preferred infusion site or sites? [facility]    1. [facility]  2. [place]    Do you have transportation to take you to all of your appointments? yes   [Not Examined] : not examined [Normal] : The patient is moving all extremities spontaneously without any gross neurologic deficits. They walk with a fluid nonantalgic gait. There are equal and symmetric deep tendon reflexes in the upper and lower extremities bilaterally. There is gross intact sensation to soft and light touch in the bilateral upper and lower extremities [FreeTextEntry1] : Gen: NAD. Cooperative for examination \par RLE \par No clinical deformity or swelling. Incision sites well healed. No erythema, drainage, or odor from incision sites \par No tenderness over the right lower extremity\par Full range of motion of the ankle, knee, and hip.\par Moving all toes freely\par Full motor and sensory exam limited due to age\par Sensation appears grossly intact\par 5/5 strength

## 2024-01-23 ENCOUNTER — HOSPITAL ENCOUNTER (OUTPATIENT)
Dept: MAMMOGRAPHY | Facility: HOSPITAL | Age: 69
Discharge: HOME | End: 2024-01-23
Attending: FAMILY MEDICINE
Payer: COMMERCIAL

## 2024-01-23 VITALS — WEIGHT: 206 LBS | BODY MASS INDEX: 41.53 KG/M2 | HEIGHT: 59 IN

## 2024-01-23 DIAGNOSIS — Z00.00 ROUTINE CHECK-UP: ICD-10-CM

## 2024-01-23 PROCEDURE — 77063 BREAST TOMOSYNTHESIS BI: CPT

## 2024-01-23 PROCEDURE — 77067 SCR MAMMO BI INCL CAD: CPT

## 2024-01-25 ENCOUNTER — HOSPITAL ENCOUNTER (OUTPATIENT)
Dept: INFUSION CENTER | Facility: CLINIC | Age: 69
Discharge: HOME | End: 2024-01-25
Payer: COMMERCIAL

## 2024-01-25 ENCOUNTER — TELEPHONE (OUTPATIENT)
Dept: HEMATOLOGY ONCOLOGY | Facility: CLINIC | Age: 69
End: 2024-01-25

## 2024-01-25 VITALS
TEMPERATURE: 97.6 F | SYSTOLIC BLOOD PRESSURE: 132 MMHG | DIASTOLIC BLOOD PRESSURE: 78 MMHG | HEART RATE: 77 BPM | OXYGEN SATURATION: 99 % | RESPIRATION RATE: 18 BRPM

## 2024-01-25 DIAGNOSIS — E55.9 VITAMIN D DEFICIENCY: ICD-10-CM

## 2024-01-25 DIAGNOSIS — D64.9 ACUTE ON CHRONIC ANEMIA: Primary | ICD-10-CM

## 2024-01-25 DIAGNOSIS — D50.0 IRON DEFICIENCY ANEMIA DUE TO CHRONIC BLOOD LOSS: ICD-10-CM

## 2024-01-25 PROCEDURE — 96372 THER/PROPH/DIAG INJ SC/IM: CPT

## 2024-01-25 PROCEDURE — 96365 THER/PROPH/DIAG IV INF INIT: CPT

## 2024-01-25 RX ORDER — CYANOCOBALAMIN 1000 UG/ML
1000 INJECTION, SOLUTION INTRAMUSCULAR; SUBCUTANEOUS ONCE
Status: CANCELLED | OUTPATIENT
Start: 2024-02-01 | End: 2024-02-01

## 2024-01-25 RX ORDER — CYANOCOBALAMIN 1000 UG/ML
1000 INJECTION, SOLUTION INTRAMUSCULAR; SUBCUTANEOUS ONCE
Status: COMPLETED | OUTPATIENT
Start: 2024-01-25 | End: 2024-01-25

## 2024-01-25 RX ORDER — SODIUM CHLORIDE 9 MG/ML
20 INJECTION, SOLUTION INTRAVENOUS ONCE
Status: CANCELLED | OUTPATIENT
Start: 2024-02-01

## 2024-01-25 RX ORDER — SODIUM CHLORIDE 9 MG/ML
20 INJECTION, SOLUTION INTRAVENOUS ONCE
Status: COMPLETED | OUTPATIENT
Start: 2024-01-25 | End: 2024-01-25

## 2024-01-25 RX ADMIN — SODIUM CHLORIDE 20 ML/HR: 0.9 INJECTION, SOLUTION INTRAVENOUS at 14:24

## 2024-01-25 RX ADMIN — IRON SUCROSE 200 MG: 20 INJECTION, SOLUTION INTRAVENOUS at 14:24

## 2024-01-25 RX ADMIN — CYANOCOBALAMIN 1000 MCG: 1000 INJECTION INTRAMUSCULAR; SUBCUTANEOUS at 14:25

## 2024-01-25 NOTE — PROGRESS NOTES
Patient presents today for venofer infusion and Vitamin b12 injection. Patient offers no complaints. VSS. Periphreral IV inserted without incident.

## 2024-01-25 NOTE — PROGRESS NOTES
Pt tolerated treatment without incident. Confirmed pts next appt for 2/1/24 @ 2:30pm @ [facility]. Pt given print out of upcoming appts, declines AVS.

## 2024-01-25 NOTE — TELEPHONE ENCOUNTER
Patient calling in regards to her infusion appointment today at 2:30pm.  Patient is currently at the lab and was worried she would be late for the infusion.  Patient stated she would leave the lab at 2:00pm to make it to her infusion.

## 2024-01-29 DIAGNOSIS — R52 PAIN: ICD-10-CM

## 2024-01-29 DIAGNOSIS — E11.9 DIABETES MELLITUS WITHOUT COMPLICATION (HCC): ICD-10-CM

## 2024-01-29 RX ORDER — CYCLOBENZAPRINE HCL 5 MG
5 TABLET ORAL
Qty: 30 TABLET | Refills: 5 | Status: SHIPPED | OUTPATIENT
Start: 2024-01-29

## 2024-01-29 RX ORDER — METFORMIN HYDROCHLORIDE 500 MG/1
TABLET, EXTENDED RELEASE ORAL
Qty: 60 TABLET | Refills: 5 | Status: SHIPPED | OUTPATIENT
Start: 2024-01-29

## 2024-01-29 NOTE — TELEPHONE ENCOUNTER
Patient called medication refill line in regards to these refills. She stated she is low on both medication. And she would like Dr. Blanca to be the one to sign off on them. Please review and advise

## 2024-02-01 ENCOUNTER — HOSPITAL ENCOUNTER (OUTPATIENT)
Dept: INFUSION CENTER | Facility: CLINIC | Age: 69
Discharge: HOME | End: 2024-02-01
Payer: COMMERCIAL

## 2024-02-01 VITALS — RESPIRATION RATE: 18 BRPM | TEMPERATURE: 96.8 F | OXYGEN SATURATION: 97 % | HEART RATE: 84 BPM

## 2024-02-01 DIAGNOSIS — E55.9 VITAMIN D DEFICIENCY: ICD-10-CM

## 2024-02-01 DIAGNOSIS — D50.0 IRON DEFICIENCY ANEMIA DUE TO CHRONIC BLOOD LOSS: ICD-10-CM

## 2024-02-01 DIAGNOSIS — D64.9 ACUTE ON CHRONIC ANEMIA: Primary | ICD-10-CM

## 2024-02-01 PROCEDURE — 96365 THER/PROPH/DIAG IV INF INIT: CPT

## 2024-02-01 PROCEDURE — 96372 THER/PROPH/DIAG INJ SC/IM: CPT

## 2024-02-01 RX ORDER — CYANOCOBALAMIN 1000 UG/ML
1000 INJECTION, SOLUTION INTRAMUSCULAR; SUBCUTANEOUS ONCE
Status: CANCELLED | OUTPATIENT
Start: 2024-02-08 | End: 2024-02-08

## 2024-02-01 RX ORDER — SODIUM CHLORIDE 9 MG/ML
20 INJECTION, SOLUTION INTRAVENOUS ONCE
Status: CANCELLED | OUTPATIENT
Start: 2024-02-08

## 2024-02-01 RX ORDER — CYANOCOBALAMIN 1000 UG/ML
1000 INJECTION, SOLUTION INTRAMUSCULAR; SUBCUTANEOUS ONCE
Status: COMPLETED | OUTPATIENT
Start: 2024-02-01 | End: 2024-02-01

## 2024-02-01 RX ORDER — SODIUM CHLORIDE 9 MG/ML
20 INJECTION, SOLUTION INTRAVENOUS ONCE
Status: COMPLETED | OUTPATIENT
Start: 2024-02-01 | End: 2024-02-01

## 2024-02-01 RX ADMIN — IRON SUCROSE 200 MG: 20 INJECTION, SOLUTION INTRAVENOUS at 14:38

## 2024-02-01 RX ADMIN — SODIUM CHLORIDE 20 ML/HR: 0.9 INJECTION, SOLUTION INTRAVENOUS at 14:39

## 2024-02-01 RX ADMIN — CYANOCOBALAMIN 1000 MCG: 1000 INJECTION INTRAMUSCULAR; SUBCUTANEOUS at 14:40

## 2024-02-01 NOTE — PROGRESS NOTES
Patient tolerated venofer infusion without incident. Vitamin b12 injection administered into right deltoid, tolerated without incident. Next venofer infusion/b12 confirmed for 2/8/2024 at 1430. AVS declined.

## 2024-02-08 ENCOUNTER — HOSPITAL ENCOUNTER (OUTPATIENT)
Dept: INFUSION CENTER | Facility: CLINIC | Age: 69
Discharge: HOME | End: 2024-02-08

## 2024-02-13 DIAGNOSIS — E55.9 VITAMIN D DEFICIENCY: ICD-10-CM

## 2024-02-13 DIAGNOSIS — D50.0 IRON DEFICIENCY ANEMIA DUE TO CHRONIC BLOOD LOSS: Primary | ICD-10-CM

## 2024-02-13 DIAGNOSIS — D64.9 ACUTE ON CHRONIC ANEMIA: ICD-10-CM

## 2024-02-13 RX ORDER — SODIUM CHLORIDE 9 MG/ML
20 INJECTION, SOLUTION INTRAVENOUS ONCE
Status: CANCELLED | OUTPATIENT
Start: 2024-02-15

## 2024-02-13 RX ORDER — CYANOCOBALAMIN 1000 UG/ML
1000 INJECTION, SOLUTION INTRAMUSCULAR; SUBCUTANEOUS ONCE
Status: CANCELLED | OUTPATIENT
Start: 2024-02-15 | End: 2024-02-13

## 2024-02-15 ENCOUNTER — HOSPITAL ENCOUNTER (OUTPATIENT)
Dept: INFUSION CENTER | Facility: CLINIC | Age: 69
Discharge: HOME | End: 2024-02-15
Payer: COMMERCIAL

## 2024-02-15 DIAGNOSIS — E55.9 VITAMIN D DEFICIENCY: ICD-10-CM

## 2024-02-15 DIAGNOSIS — D64.9 ACUTE ON CHRONIC ANEMIA: Primary | ICD-10-CM

## 2024-02-15 DIAGNOSIS — D50.0 IRON DEFICIENCY ANEMIA DUE TO CHRONIC BLOOD LOSS: ICD-10-CM

## 2024-02-15 PROCEDURE — 96365 THER/PROPH/DIAG IV INF INIT: CPT

## 2024-02-15 PROCEDURE — 96372 THER/PROPH/DIAG INJ SC/IM: CPT

## 2024-02-15 RX ORDER — SODIUM CHLORIDE 9 MG/ML
20 INJECTION, SOLUTION INTRAVENOUS ONCE
Status: COMPLETED | OUTPATIENT
Start: 2024-02-15 | End: 2024-02-15

## 2024-02-15 RX ORDER — SODIUM CHLORIDE 9 MG/ML
20 INJECTION, SOLUTION INTRAVENOUS ONCE
Status: CANCELLED | OUTPATIENT
Start: 2024-02-22

## 2024-02-15 RX ORDER — CYANOCOBALAMIN 1000 UG/ML
1000 INJECTION, SOLUTION INTRAMUSCULAR; SUBCUTANEOUS ONCE
Status: COMPLETED | OUTPATIENT
Start: 2024-02-15 | End: 2024-02-15

## 2024-02-15 RX ORDER — CYANOCOBALAMIN 1000 UG/ML
1000 INJECTION, SOLUTION INTRAMUSCULAR; SUBCUTANEOUS ONCE
Status: CANCELLED | OUTPATIENT
Start: 2024-02-22 | End: 2024-02-22

## 2024-02-15 RX ADMIN — IRON SUCROSE 200 MG: 20 INJECTION, SOLUTION INTRAVENOUS at 14:15

## 2024-02-15 RX ADMIN — SODIUM CHLORIDE 20 ML/HR: 9 INJECTION, SOLUTION INTRAVENOUS at 14:11

## 2024-02-15 RX ADMIN — CYANOCOBALAMIN 1000 MCG: 1000 INJECTION, SOLUTION INTRAMUSCULAR at 14:11

## 2024-02-15 NOTE — PROGRESS NOTES
Pt to clinic for venofer infusion and B12 injection. Pt tolerated injection in left arm. Next appointment Feb 22 at 2:30

## 2024-02-16 ENCOUNTER — TELEPHONE (OUTPATIENT)
Dept: GASTROENTEROLOGY | Facility: CLINIC | Age: 69
End: 2024-02-16

## 2024-02-16 ENCOUNTER — CONSULT (OUTPATIENT)
Dept: GASTROENTEROLOGY | Facility: CLINIC | Age: 69
End: 2024-02-16
Payer: COMMERCIAL

## 2024-02-16 VITALS
SYSTOLIC BLOOD PRESSURE: 140 MMHG | BODY MASS INDEX: 42.13 KG/M2 | HEIGHT: 59 IN | WEIGHT: 209 LBS | DIASTOLIC BLOOD PRESSURE: 81 MMHG | HEART RATE: 82 BPM

## 2024-02-16 DIAGNOSIS — D50.9 IRON DEFICIENCY ANEMIA, UNSPECIFIED IRON DEFICIENCY ANEMIA TYPE: ICD-10-CM

## 2024-02-16 DIAGNOSIS — Z98.84 H/O GASTRIC BYPASS: ICD-10-CM

## 2024-02-16 DIAGNOSIS — Z12.11 SCREENING FOR COLON CANCER: Primary | ICD-10-CM

## 2024-02-16 DIAGNOSIS — E66.01 MORBID OBESITY DUE TO EXCESS CALORIES (HCC): ICD-10-CM

## 2024-02-16 PROCEDURE — 99204 OFFICE O/P NEW MOD 45 MIN: CPT | Performed by: INTERNAL MEDICINE

## 2024-02-16 RX ORDER — POLYETHYLENE GLYCOL 3350, SODIUM SULFATE ANHYDROUS, SODIUM BICARBONATE, SODIUM CHLORIDE, POTASSIUM CHLORIDE 236; 22.74; 6.74; 5.86; 2.97 G/4L; G/4L; G/4L; G/4L; G/4L
4 POWDER, FOR SOLUTION ORAL ONCE
Qty: 4000 ML | Refills: 0 | Status: SHIPPED | OUTPATIENT
Start: 2024-02-16 | End: 2024-02-16

## 2024-02-16 NOTE — TELEPHONE ENCOUNTER
Scheduled date of EGD/colonoscopy (as of today): 4/1/24  Physician performing EGD/colonoscopy: Dr Randall  Location of EGD/colonoscopy:    Desired bowel prep reviewed with patient: Golytely given at appt   Instructions reviewed with patient by: mary  Clearances:  n/a  Diabetic - metformin - given at appt

## 2024-02-16 NOTE — TELEPHONE ENCOUNTER
Patients GI provider:  Dr. Randall     Number to return call: [phone]    Reason for call: Pt called stating at visit with Dr. Randall today he advised blood work would be ordered for pt. Pt states she does not see anything in mychart. Please advise.      Scheduled procedure/appointment date if applicable:procedure 4/1

## 2024-02-16 NOTE — PROGRESS NOTES
[facility] - Outpatient Consultation  Rosalie Pelaez 68 y.o. female MRN: [id number]  Encounter: [id number]          ASSESSMENT AND PLAN:      1. Screening for colon cancer  -     Ambulatory Referral to Gastroenterology  -     Colonoscopy; Future; Expected date: 02/16/2024  -     polyethylene glycol (Golytely) 4000 mL solution; Take 4,000 mL by mouth once for 1 dose Take according to instructions given by the office for colonoscopy bowel prep.    2. Iron deficiency anemia, unspecified iron deficiency anemia type  -     EGD; Future; Expected date: 02/16/2024    3. H/O gastric bypass  -     EGD; Future; Expected date: 02/16/2024    4. Morbid obesity due to excess calories (HCC)      Patient with history of iron deficiency anemia iron saturation 5%, received blood transfusion and iron infusion, also B12 is low.  This all could be nutritional from gastric bypass surgery but any other GI pathology of concern.  Last colonoscopy was more than 10 years ago.  Will check stool for occult blood and also check hemoglobin and iron studies.  Schedule EGD colonoscopy, procedure and prep discussed at length.  ______________________________________________________________________    HPI:      Patient came in for evaluation of her history of iron deficiency anemia, she was admitted to the hospital, hemoglobin of 8 without evidence of iron deficiency anemia gastric bypass surgery, received 1 year of blood transfusion and also subsequent iron infusions, was seen by hematology as well.  Vitamin B12 was 151 low.  And received an injection for the same.  Patient denies any apparent blood in stools melena hematochezia dysphagia coughing choking spells nausea vomiting, denies any fever chills rash.  Diet medications more than 10 pertinent systems reviewed.  Denies any history of chest pain shortness of breath stents pacemakers.      REVIEW OF SYSTEMS:    CONSTITUTIONAL: Denies any fever, chills, rigors, and  weight loss.  HEENT: No earache or tinnitus.  CARDIOVASCULAR: No chest pain or palpitations.   RESPIRATORY: Denies any cough, hemoptysis, shortness of breath or dyspnea on exertion.  GASTROINTESTINAL: As noted in the History of Present Illness.   GENITOURINARY: Denies any hematuria or dysuria.  NEUROLOGIC: No dizziness or vertigo.   MUSCULOSKELETAL: Denies any joint swellings.  SKIN: Denies skin rashes or itching.   ENDOCRINE: Denies excessive thirst. Denies intolerance to heat or cold.  PSYCHOSOCIAL: Denies depression or anxiety. Denies any recent memory loss.       Historical Information   Past Medical History:   Diagnosis Date   • Anemia 2020   • Diabetes mellitus (HCC)    • Fibromyalgia    • Hyperlipidemia      Past Surgical History:   Procedure Laterality Date   • CHOLECYSTECTOMY     • GASTRIC BYPASS     • MAMMO (HISTORICAL)  05/2012   • MEDIASTINOSCOPY       Social History   Social History     Substance and Sexual Activity   Alcohol Use Never     Social History     Substance and Sexual Activity   Drug Use Never    Comment: Hx: Over 30 years ago.      Social History     Tobacco Use   Smoking Status Never   Smokeless Tobacco Never     Family History   Problem Relation Age of Onset   • Hypertension Mother    • Diabetes Mother    • COPD Mother    • Stroke Father    • Heart disease Father    • Diabetes Father    • Hypertension Father    • Hypertension Maternal Grandmother    • Diabetes Maternal Aunt    • Lung cancer Other 41       Meds/Allergies       Current Outpatient Medications:   •  Calcium Carbonate-Vit D-Min (CALCIUM 1200 PO)  •  Cholecalciferol (Vitamin D3) 125 MCG (5000 UT) TABS  •  cyclobenzaprine (FLEXERIL) 5 mg tablet  •  glucose blood test strip  •  Lancets (onetouch ultrasoft) lancets  •  metFORMIN (GLUCOPHAGE-XR) 500 mg 24 hr tablet  •  multivitamin (THERAGRAN) TABS  •  polyethylene glycol (Golytely) 4000 mL solution  •  ferrous sulfate 325 (65 Fe) mg tablet  •  vitamin B-12 (CYANOCOBALAMIN) 500 MCG  "TABS  No current facility-administered medications for this visit.    No Known Allergies        Objective     Blood pressure 140/81, pulse 82, height 4' 11\" (1.499 m), weight 94.8 kg (209 lb). Body mass index is 42.21 kg/m².        PHYSICAL EXAM:      General Appearance:   Alert, cooperative, no distress   HEENT:   Normocephalic, atraumatic, anicteric.     Neck:  Supple, symmetrical, trachea midline   Lungs:   Clear to auscultation bilaterally; no rales, rhonchi or wheezing; respirations unlabored    Heart::   Regular rate and rhythm; no murmur.   Abdomen:   Soft, non-tender, non-distended; normal bowel sounds; no masses, no organomegaly    Genitalia:   Deferred    Rectal:   Deferred    Extremities:  No cyanosis, clubbing or edema    Skin:  No jaundice, rashes, or lesions    Lymph nodes:  No palpable cervical lymphadenopathy        Lab Results:   No visits with results within 1 Day(s) from this visit.   Latest known visit with results is:   Admission on 12/29/2023, Discharged on 12/31/2023   Component Date Value   • SARS-CoV-2 12/29/2023 Negative    • INFLUENZA A PCR 12/29/2023 Negative    • INFLUENZA B PCR 12/29/2023 Negative    • RSV PCR 12/29/2023 Negative    • WBC 12/29/2023 8.58    • RBC 12/29/2023 3.64 (L)    • Hemoglobin 12/29/2023 8.7 (L)    • Hematocrit 12/29/2023 30.4 (L)    • MCV 12/29/2023 84    • MCH 12/29/2023 23.9 (L)    • MCHC 12/29/2023 28.6 (L)    • RDW 12/29/2023 17.9 (H)    • MPV 12/29/2023 10.1    • Platelets 12/29/2023 445 (H)    • nRBC 12/29/2023 0    • Neutrophils Relative 12/29/2023 57    • Immat GRANS % 12/29/2023 0    • Lymphocytes Relative 12/29/2023 32    • Monocytes Relative 12/29/2023 7    • Eosinophils Relative 12/29/2023 3    • Basophils Relative 12/29/2023 1    • Neutrophils Absolute 12/29/2023 4.93    • Immature Grans Absolute 12/29/2023 0.02    • Lymphocytes Absolute 12/29/2023 2.76    • Monocytes Absolute 12/29/2023 0.60    • Eosinophils Absolute 12/29/2023 0.22    • Basophils " Absolute 12/29/2023 0.05    • Sodium 12/29/2023 137    • Potassium 12/29/2023 4.0    • Chloride 12/29/2023 104    • CO2 12/29/2023 25    • ANION GAP 12/29/2023 8    • BUN 12/29/2023 11    • Creatinine 12/29/2023 0.79    • Glucose 12/29/2023 126    • Calcium 12/29/2023 9.5    • AST 12/29/2023 30    • ALT 12/29/2023 15    • Alkaline Phosphatase 12/29/2023 98    • Total Protein 12/29/2023 7.4    • Albumin 12/29/2023 3.9    • Total Bilirubin 12/29/2023 0.44    • eGFR 12/29/2023 77    • BNP 12/29/2023 53    • D-Dimer, Quant 12/29/2023 0.43    • hs TnI 0hr 12/29/2023 2    • Ventricular Rate 12/29/2023 99    • Atrial Rate 12/29/2023 99    • MT Interval 12/29/2023 132    • QRSD Interval 12/29/2023 80    • QT Interval 12/29/2023 342    • QTC Interval 12/29/2023 438    • P Axis 12/29/2023 57    • QRS Axis 12/29/2023 66    • T Wave Axis 12/29/2023 41    • hs TnI 2hr 12/29/2023 3    • Delta 2hr hsTnI 12/29/2023 1    • hs TnI 4hr 12/31/2023 3    • ABO Grouping 12/29/2023 O    • Rh Factor 12/29/2023 Positive    • Antibody Screen 12/29/2023 Negative    • Specimen Expiration Date 12/29/2023 20240101    • Unit Product Code 12/30/2023 [id number]    • Unit Number 12/30/2023 [id number]    • Unit ABO 12/30/2023 O    • Unit RH 12/30/2023 POS    • Crossmatch 12/30/2023 Compatible    • Unit Dispense Status 12/30/2023 Presumed Trans    • Unit Product Volume 12/30/2023 350    • ABO Grouping 12/29/2023 O    • Rh Factor 12/29/2023 Positive    • ABO Grouping 12/31/2023 O    • Rh Factor 12/31/2023 Positive    • Sodium 12/30/2023 137    • Potassium 12/30/2023 3.8    • Chloride 12/30/2023 105    • CO2 12/30/2023 26    • ANION GAP 12/30/2023 6    • BUN 12/30/2023 10    • Creatinine 12/30/2023 0.74    • Glucose 12/30/2023 122    • Calcium 12/30/2023 9.1    • AST 12/30/2023 25    • ALT 12/30/2023 13    • Alkaline Phosphatase 12/30/2023 87    • Total Protein 12/30/2023 6.8    • Albumin 12/30/2023 3.6    • Total Bilirubin 12/30/2023 1.26 (H)    •  eGFR 12/30/2023 84    • WBC 12/30/2023 7.97    • RBC 12/30/2023 3.68 (L)    • Hemoglobin 12/30/2023 9.2 (L)    • Hematocrit 12/30/2023 30.1 (L)    • MCV 12/30/2023 82    • MCH 12/30/2023 25.0 (L)    • MCHC 12/30/2023 30.6 (L)    • RDW 12/30/2023 17.4 (H)    • MPV 12/30/2023 10.2    • Platelets 12/30/2023 408 (H)    • nRBC 12/30/2023 0    • Neutrophils Relative 12/30/2023 60    • Immat GRANS % 12/30/2023 0    • Lymphocytes Relative 12/30/2023 30    • Monocytes Relative 12/30/2023 7    • Eosinophils Relative 12/30/2023 2    • Basophils Relative 12/30/2023 1    • Neutrophils Absolute 12/30/2023 4.81    • Immature Grans Absolute 12/30/2023 0.02    • Lymphocytes Absolute 12/30/2023 2.41    • Monocytes Absolute 12/30/2023 0.55    • Eosinophils Absolute 12/30/2023 0.13    • Basophils Absolute 12/30/2023 0.05    • Vitamin B-12 12/30/2023 151 (L)    • Folate 12/30/2023 10.7    • Iron Saturation 12/29/2023 5 (L)    • TIBC 12/29/2023 437    • Iron 12/29/2023 23 (L)    • UIBC 12/29/2023 414 (H)    • Ferritin 12/29/2023 7 (L)    • POC Glucose 12/30/2023 117    • POC Glucose 12/30/2023 177 (H)    • POC Glucose 12/30/2023 150 (H)    • POC Glucose 12/30/2023 144 (H)    • WBC 12/31/2023 6.85    • RBC 12/31/2023 3.47 (L)    • Hemoglobin 12/31/2023 8.6 (L)    • Hematocrit 12/31/2023 28.1 (L)    • MCV 12/31/2023 81 (L)    • MCH 12/31/2023 24.8 (L)    • MCHC 12/31/2023 30.6 (L)    • RDW 12/31/2023 17.6 (H)    • Platelets 12/31/2023 361    • MPV 12/31/2023 9.8    • POC Glucose 12/31/2023 124          Radiology Results:   Mammo screening bilateral w 3d & cad    Result Date: 1/24/2024  Narrative: DIAGNOSIS: Routine check-up TECHNIQUE: Digital screening mammography was performed. Computer Aided Detection (CAD) analyzed all applicable images. COMPARISONS: Multiple prior exams dated between 5/3/2012 and 8/25/2022. RELEVANT HISTORY: Family Breast Cancer History: No known family history of breast cancer. Family Medical History: No known  relevant family medical history. Personal History: No known relevant hormone history. No known relevant surgical history. No known relevant medical history. The patient is scheduled in a reminder system for screening mammography. 8-10% of cancers will be missed on mammography. Management of a palpable abnormality must be based on clinical grounds.  Patients will be notified of their results via letter from our facility. Accredited by [employer] and FDA. RISK ASSESSMENT: 5 Year Tyrer-Cuzick: 0.77% 10 Year Tyrer-Cuzick: 1.53% Lifetime Tyrer-Cuzick: 2.95% TISSUE DENSITY: The breasts are almost entirely fatty. INDICATION: Rosalie Pelaez is a 68 y.o. female presenting for screening mammography. FINDINGS: Bilateral There are no suspicious masses, grouped microcalcifications or areas of unexplained architectural distortion. The skin and nipple areolar complex are unremarkable.  Benign-appearing calcifications are noted in the right breast.     Impression: No mammographic evidence of malignancy. ASSESSMENT/BI-RADS CATEGORY: Left: 2 - Benign Right: 2 - Benign Overall: 2 - Benign RECOMMENDATION:      - Routine screening mammogram in 1 year for both breasts. Workstation ID: [id number]

## 2024-02-19 DIAGNOSIS — D50.9 IRON DEFICIENCY ANEMIA, UNSPECIFIED IRON DEFICIENCY ANEMIA TYPE: Primary | ICD-10-CM

## 2024-02-22 ENCOUNTER — HOSPITAL ENCOUNTER (OUTPATIENT)
Dept: INFUSION CENTER | Facility: CLINIC | Age: 69
Discharge: HOME | End: 2024-02-22
Payer: COMMERCIAL

## 2024-02-22 VITALS
HEART RATE: 83 BPM | SYSTOLIC BLOOD PRESSURE: 139 MMHG | RESPIRATION RATE: 18 BRPM | DIASTOLIC BLOOD PRESSURE: 75 MMHG | TEMPERATURE: 97.3 F | OXYGEN SATURATION: 97 %

## 2024-02-22 DIAGNOSIS — D50.0 IRON DEFICIENCY ANEMIA DUE TO CHRONIC BLOOD LOSS: ICD-10-CM

## 2024-02-22 DIAGNOSIS — D64.9 ACUTE ON CHRONIC ANEMIA: Primary | ICD-10-CM

## 2024-02-22 DIAGNOSIS — E55.9 VITAMIN D DEFICIENCY: ICD-10-CM

## 2024-02-22 PROCEDURE — 96365 THER/PROPH/DIAG IV INF INIT: CPT

## 2024-02-22 PROCEDURE — 96372 THER/PROPH/DIAG INJ SC/IM: CPT

## 2024-02-22 RX ORDER — CYANOCOBALAMIN 1000 UG/ML
1000 INJECTION, SOLUTION INTRAMUSCULAR; SUBCUTANEOUS ONCE
Status: COMPLETED | OUTPATIENT
Start: 2024-02-22 | End: 2024-02-22

## 2024-02-22 RX ORDER — SODIUM CHLORIDE 9 MG/ML
20 INJECTION, SOLUTION INTRAVENOUS ONCE
Status: COMPLETED | OUTPATIENT
Start: 2024-02-22 | End: 2024-02-22

## 2024-02-22 RX ORDER — CYANOCOBALAMIN 1000 UG/ML
1000 INJECTION, SOLUTION INTRAMUSCULAR; SUBCUTANEOUS ONCE
Status: CANCELLED | OUTPATIENT
Start: 2024-02-29 | End: 2024-02-29

## 2024-02-22 RX ORDER — SODIUM CHLORIDE 9 MG/ML
20 INJECTION, SOLUTION INTRAVENOUS ONCE
OUTPATIENT
Start: 2024-02-29

## 2024-02-22 RX ADMIN — CYANOCOBALAMIN 1000 MCG: 1000 INJECTION, SOLUTION INTRAMUSCULAR at 14:45

## 2024-02-22 RX ADMIN — IRON SUCROSE 200 MG: 20 INJECTION, SOLUTION INTRAVENOUS at 14:44

## 2024-02-22 RX ADMIN — SODIUM CHLORIDE 20 ML/HR: 0.9 INJECTION, SOLUTION INTRAVENOUS at 14:40

## 2024-02-29 ENCOUNTER — HOSPITAL ENCOUNTER (OUTPATIENT)
Dept: INFUSION CENTER | Facility: CLINIC | Age: 69
End: 2024-02-29

## 2024-02-29 ENCOUNTER — TELEPHONE (OUTPATIENT)
Dept: HEMATOLOGY ONCOLOGY | Facility: CLINIC | Age: 69
End: 2024-02-29

## 2024-02-29 NOTE — TELEPHONE ENCOUNTER
Call Transfer   Who are you speaking with?  Patient   If it is not the patient, are they listed on an active communication consent form? N/A   Who is the patients HemOnc/SurgOnc provider? N/A   What is the reason for this call? Patient is requesting to speak to infusion.    Person/Department that the call was transferred to?    Time that call was transferred?    infusion @8:50AM   Your call will be transferred now. If you receive a voicemail, please leave a detailed message and a member of the team will return your call as soon as possible.    Did you relay this information to the caller?  Yes

## 2024-03-05 DIAGNOSIS — D64.9 ACUTE ON CHRONIC ANEMIA: ICD-10-CM

## 2024-03-05 DIAGNOSIS — D50.0 IRON DEFICIENCY ANEMIA DUE TO CHRONIC BLOOD LOSS: Primary | ICD-10-CM

## 2024-03-05 DIAGNOSIS — E55.9 VITAMIN D DEFICIENCY: ICD-10-CM

## 2024-03-05 RX ORDER — SODIUM CHLORIDE 9 MG/ML
20 INJECTION, SOLUTION INTRAVENOUS ONCE
Status: CANCELLED | OUTPATIENT
Start: 2024-03-07

## 2024-03-06 ENCOUNTER — TELEPHONE (OUTPATIENT)
Dept: HEMATOLOGY ONCOLOGY | Facility: CLINIC | Age: 69
End: 2024-03-06

## 2024-03-06 DIAGNOSIS — E53.8 VITAMIN B12 DEFICIENCY: Primary | ICD-10-CM

## 2024-03-06 DIAGNOSIS — D64.9 ACUTE ON CHRONIC ANEMIA: ICD-10-CM

## 2024-03-06 RX ORDER — CYANOCOBALAMIN 1000 UG/ML
1000 INJECTION, SOLUTION INTRAMUSCULAR; SUBCUTANEOUS ONCE
OUTPATIENT
Start: 2024-03-22

## 2024-03-07 ENCOUNTER — HOSPITAL ENCOUNTER (OUTPATIENT)
Dept: INFUSION CENTER | Facility: CLINIC | Age: 69
Discharge: HOME | End: 2024-03-07
Payer: COMMERCIAL

## 2024-03-07 DIAGNOSIS — D64.9 ACUTE ON CHRONIC ANEMIA: Primary | ICD-10-CM

## 2024-03-07 DIAGNOSIS — E55.9 VITAMIN D DEFICIENCY: ICD-10-CM

## 2024-03-07 DIAGNOSIS — D50.0 IRON DEFICIENCY ANEMIA DUE TO CHRONIC BLOOD LOSS: ICD-10-CM

## 2024-03-07 PROCEDURE — 96365 THER/PROPH/DIAG IV INF INIT: CPT

## 2024-03-07 RX ORDER — SODIUM CHLORIDE 9 MG/ML
20 INJECTION, SOLUTION INTRAVENOUS ONCE
Status: COMPLETED | OUTPATIENT
Start: 2024-03-07 | End: 2024-03-07

## 2024-03-07 RX ORDER — SODIUM CHLORIDE 9 MG/ML
20 INJECTION, SOLUTION INTRAVENOUS ONCE
Status: CANCELLED | OUTPATIENT
Start: 2024-03-07

## 2024-03-07 RX ADMIN — IRON SUCROSE 200 MG: 20 INJECTION, SOLUTION INTRAVENOUS at 14:18

## 2024-03-07 RX ADMIN — SODIUM CHLORIDE 20 ML/HR: 0.9 INJECTION, SOLUTION INTRAVENOUS at 14:15

## 2024-03-07 NOTE — PROGRESS NOTES
Treatment complete, no complaints offered. Pt to return 3/22 for b12 injection at 130pm. AVS printed and given to pt.

## 2024-03-07 NOTE — PROGRESS NOTES
Received for Venofer. No complaints offered. IV initiated in left upper arm. Meds infusing per md order.

## 2024-03-22 ENCOUNTER — TELEPHONE (OUTPATIENT)
Dept: HEMATOLOGY ONCOLOGY | Facility: CLINIC | Age: 69
End: 2024-03-22

## 2024-03-22 ENCOUNTER — HOSPITAL ENCOUNTER (OUTPATIENT)
Dept: INFUSION CENTER | Facility: CLINIC | Age: 69
Discharge: HOME | End: 2024-03-22

## 2024-03-22 NOTE — TELEPHONE ENCOUNTER
Patient Call    Who are you speaking with? Patient    If it is not the patient, are they listed on an active communication consent form? Yes   What is the reason for this call? Resched treatment today, elevators broken in building   Does this require a call back? Yes   If a call back is required, please list [facility] call back number [phone]    If a call back is required, advise that a message will be forwarded to their care team and someone will return their call as soon as possible.   Did you relay this information to the patient? Yes

## 2024-03-25 DIAGNOSIS — E53.8 VITAMIN B12 DEFICIENCY: Primary | ICD-10-CM

## 2024-03-25 DIAGNOSIS — D64.9 ACUTE ON CHRONIC ANEMIA: ICD-10-CM

## 2024-03-25 RX ORDER — CYANOCOBALAMIN 1000 UG/ML
1000 INJECTION, SOLUTION INTRAMUSCULAR; SUBCUTANEOUS ONCE
Status: CANCELLED | OUTPATIENT
Start: 2024-03-28

## 2024-03-28 ENCOUNTER — HOSPITAL ENCOUNTER (OUTPATIENT)
Dept: INFUSION CENTER | Facility: CLINIC | Age: 69
Discharge: HOME | End: 2024-03-28
Payer: COMMERCIAL

## 2024-03-28 DIAGNOSIS — D64.9 ACUTE ON CHRONIC ANEMIA: ICD-10-CM

## 2024-03-28 DIAGNOSIS — E53.8 VITAMIN B12 DEFICIENCY: Primary | ICD-10-CM

## 2024-03-28 PROCEDURE — 96372 THER/PROPH/DIAG INJ SC/IM: CPT

## 2024-03-28 RX ORDER — CYANOCOBALAMIN 1000 UG/ML
1000 INJECTION, SOLUTION INTRAMUSCULAR; SUBCUTANEOUS ONCE
OUTPATIENT
Start: 2024-04-25

## 2024-03-28 RX ORDER — CYANOCOBALAMIN 1000 UG/ML
1000 INJECTION, SOLUTION INTRAMUSCULAR; SUBCUTANEOUS ONCE
Status: COMPLETED | OUTPATIENT
Start: 2024-03-28 | End: 2024-03-28

## 2024-03-28 RX ADMIN — CYANOCOBALAMIN 1000 MCG: 1000 INJECTION, SOLUTION INTRAMUSCULAR at 14:42

## 2024-03-28 NOTE — PROGRESS NOTES
Received for vit b12 injection. No complaints offered. Injection given per md order in left arm. Pt to schedule next appt prior to leaving. AVS declined.

## 2024-03-29 ENCOUNTER — NURSE TRIAGE (OUTPATIENT)
Age: 69
End: 2024-03-29

## 2024-03-29 NOTE — TELEPHONE ENCOUNTER
"SPOKE WITH PT, INFORMED SHE WILL BE CALLED WITH ARRIVAL TIME THIS AFTERNOON. PT VERBALIZED UNDERSTANDING.        Reason for Disposition   Information only question and nurse able to answer    Answer Assessment - Initial Assessment Questions  1. REASON FOR CALL or QUESTION: \"What is your reason for calling today?\" or \"How can I best help you?\" or \"What question do you have that I can help answer?\"      PT CALLING FOR ARRIVAL TIME FOR PROCEDURE SCHEDULED FOR 4/1/24.    Protocols used: Information Only Call - No Triage-ADULT-[place]    "

## 2024-04-01 ENCOUNTER — ANESTHESIA (OUTPATIENT)
Dept: GASTROENTEROLOGY | Facility: HOSPITAL | Age: 69
End: 2024-04-01

## 2024-04-01 ENCOUNTER — ANESTHESIA EVENT (OUTPATIENT)
Dept: GASTROENTEROLOGY | Facility: HOSPITAL | Age: 69
End: 2024-04-01

## 2024-04-01 ENCOUNTER — HOSPITAL ENCOUNTER (OUTPATIENT)
Dept: GASTROENTEROLOGY | Facility: HOSPITAL | Age: 69
Setting detail: OUTPATIENT SURGERY
Discharge: HOME | End: 2024-04-01
Attending: INTERNAL MEDICINE
Payer: COMMERCIAL

## 2024-04-01 VITALS
SYSTOLIC BLOOD PRESSURE: 144 MMHG | DIASTOLIC BLOOD PRESSURE: 65 MMHG | OXYGEN SATURATION: 96 % | HEIGHT: 59 IN | TEMPERATURE: 96.9 F | HEART RATE: 83 BPM | RESPIRATION RATE: 18 BRPM | WEIGHT: 198 LBS | BODY MASS INDEX: 39.92 KG/M2

## 2024-04-01 DIAGNOSIS — D50.9 IRON DEFICIENCY ANEMIA, UNSPECIFIED IRON DEFICIENCY ANEMIA TYPE: ICD-10-CM

## 2024-04-01 DIAGNOSIS — Z12.11 SCREENING FOR COLON CANCER: ICD-10-CM

## 2024-04-01 DIAGNOSIS — Z98.84 H/O GASTRIC BYPASS: ICD-10-CM

## 2024-04-01 LAB — GLUCOSE SERPL-MCNC: 124 MG/DL (ref 65–140)

## 2024-04-01 PROCEDURE — 82948 REAGENT STRIP/BLOOD GLUCOSE: CPT

## 2024-04-01 PROCEDURE — 88342 IMHCHEM/IMCYTCHM 1ST ANTB: CPT | Performed by: PATHOLOGY

## 2024-04-01 PROCEDURE — 88305 TISSUE EXAM BY PATHOLOGIST: CPT | Performed by: PATHOLOGY

## 2024-04-01 RX ORDER — LIDOCAINE HCL/PF 100 MG/5ML
SYRINGE (ML) INJECTION AS NEEDED
Status: DISCONTINUED | OUTPATIENT
Start: 2024-04-01 | End: 2024-04-01

## 2024-04-01 RX ORDER — PROPOFOL 10 MG/ML
INJECTION, EMULSION INTRAVENOUS AS NEEDED
Status: DISCONTINUED | OUTPATIENT
Start: 2024-04-01 | End: 2024-04-01

## 2024-04-01 RX ORDER — SODIUM CHLORIDE, SODIUM LACTATE, POTASSIUM CHLORIDE, CALCIUM CHLORIDE 600; 310; 30; 20 MG/100ML; MG/100ML; MG/100ML; MG/100ML
INJECTION, SOLUTION INTRAVENOUS CONTINUOUS PRN
Status: DISCONTINUED | OUTPATIENT
Start: 2024-04-01 | End: 2024-04-01

## 2024-04-01 RX ADMIN — PROPOFOL 40 MG: 10 INJECTION, EMULSION INTRAVENOUS at 11:50

## 2024-04-01 RX ADMIN — PROPOFOL 40 MG: 10 INJECTION, EMULSION INTRAVENOUS at 11:41

## 2024-04-01 RX ADMIN — PROPOFOL 40 MG: 10 INJECTION, EMULSION INTRAVENOUS at 11:47

## 2024-04-01 RX ADMIN — PROPOFOL 40 MG: 10 INJECTION, EMULSION INTRAVENOUS at 11:44

## 2024-04-01 RX ADMIN — PROPOFOL 100 MG: 10 INJECTION, EMULSION INTRAVENOUS at 11:32

## 2024-04-01 RX ADMIN — SODIUM CHLORIDE, SODIUM LACTATE, POTASSIUM CHLORIDE, AND CALCIUM CHLORIDE: .6; .31; .03; .02 INJECTION, SOLUTION INTRAVENOUS at 11:27

## 2024-04-01 RX ADMIN — PROPOFOL 40 MG: 10 INJECTION, EMULSION INTRAVENOUS at 11:39

## 2024-04-01 RX ADMIN — PROPOFOL 50 MG: 10 INJECTION, EMULSION INTRAVENOUS at 11:35

## 2024-04-01 RX ADMIN — LIDOCAINE HYDROCHLORIDE 100 MG: 20 INJECTION INTRAVENOUS at 11:32

## 2024-04-01 RX ADMIN — PROPOFOL 50 MG: 10 INJECTION, EMULSION INTRAVENOUS at 11:37

## 2024-04-01 NOTE — ANESTHESIA POSTPROCEDURE EVALUATION
Post-Op Assessment Note    CV Status:  Stable  Pain Score: 0    Pain management: adequate       Mental Status:  Sleepy and arousable   Hydration Status:  Stable   PONV Controlled:  Controlled   Airway Patency:  Patent     Post Op Vitals Reviewed: Yes    No anethesia notable event occurred.    Staff: CRNA               BP   166/77   Temp      Pulse  75   Resp   14   SpO2   100

## 2024-04-01 NOTE — ANESTHESIA PREPROCEDURE EVALUATION
Procedure:  COLONOSCOPY  EGD    Relevant Problems   CARDIO   (+) Hypercholesterolemia      HEMATOLOGY   (+) Acute on chronic anemia   (+) Iron deficiency anemia      MUSCULOSKELETAL   (+) Chronic bilateral low back pain without sciatica   (+) Fibromyalgia   (+) Primary osteoarthritis of both knees      NEURO/PSYCH   (+) Chronic bilateral low back pain without sciatica   (+) Fibromyalgia   (+) Headache      H/o gastric bypass  BMI 40    Physical Exam    Airway    Mallampati score: II  TM Distance: >3 FB  Neck ROM: full     Dental       Cardiovascular  Cardiovascular exam normal    Pulmonary  Pulmonary exam normal     Other Findings  post-pubertal.      Anesthesia Plan  ASA Score- 3     Anesthesia Type- IV sedation with anesthesia with ASA Monitors.         Additional Monitors:     Airway Plan:            Plan Factors-    Chart reviewed. EKG reviewed.  Existing labs reviewed. Patient summary reviewed.                  Induction- intravenous.    Postoperative Plan-     Informed Consent- Anesthetic plan and risks discussed with patient.  I personally reviewed this patient with the CRNA. Discussed and agreed on the Anesthesia Plan with the CRNA..

## 2024-04-01 NOTE — H&P
History and Physical -  [facility]  Rosalie Pelaez 68 y.o. female MRN: [id number]                  HPI: Rosalie Pelaez is a 68 y.o. year old female who presents for history of anemia and screening colonoscopy.      REVIEW OF SYSTEMS: Per the HPI, and otherwise unremarkable.    Historical Information   Past Medical History:   Diagnosis Date    Anemia 2020    Diabetes mellitus (HCC)     Fibromyalgia     Hyperlipidemia      Past Surgical History:   Procedure Laterality Date    CHOLECYSTECTOMY      GASTRIC BYPASS      MAMMO (HISTORICAL)  05/2012    MEDIASTINOSCOPY       Social History   Social History     Substance and Sexual Activity   Alcohol Use Never     Social History     Substance and Sexual Activity   Drug Use Never    Comment: Hx: Over 30 years ago.      Social History     Tobacco Use   Smoking Status Never   Smokeless Tobacco Never     Family History   Problem Relation Age of Onset    Hypertension Mother     Diabetes Mother     COPD Mother     Stroke Father     Heart disease Father     Diabetes Father     Hypertension Father     Hypertension Maternal Grandmother     Diabetes Maternal Aunt     Lung cancer Other 41       Meds/Allergies       Current Outpatient Medications:     Calcium Carbonate-Vit D-Min (CALCIUM 1200 PO)    Cholecalciferol (Vitamin D3) 125 MCG (5000 UT) TABS    cyclobenzaprine (FLEXERIL) 5 mg tablet    metFORMIN (GLUCOPHAGE-XR) 500 mg 24 hr tablet    multivitamin (THERAGRAN) TABS    glucose blood test strip    Lancets (onetouch ultrasoft) lancets    polyethylene glycol (Golytely) 4000 mL solution    vitamin B-12 (CYANOCOBALAMIN) 500 MCG TABS    No Known Allergies    Objective     There were no vitals taken for this visit.      PHYSICAL EXAM    Gen: NAD  Head: NCAT  CV: RRR  CHEST: Clear  ABD: soft, NT/ND  EXT: no edema      ASSESSMENT/PLAN:  This is a 68 y.o. year old female here for EGD colonoscopy, and she is stable and optimized for her procedure.

## 2024-04-04 ENCOUNTER — TELEPHONE (OUTPATIENT)
Dept: HEMATOLOGY ONCOLOGY | Facility: CLINIC | Age: 69
End: 2024-04-04

## 2024-04-04 ENCOUNTER — APPOINTMENT (OUTPATIENT)
Dept: LAB | Facility: HOSPITAL | Age: 69
End: 2024-04-04
Payer: COMMERCIAL

## 2024-04-04 DIAGNOSIS — D64.9 ANEMIA, UNSPECIFIED TYPE: ICD-10-CM

## 2024-04-04 DIAGNOSIS — E53.8 VITAMIN B12 DEFICIENCY: ICD-10-CM

## 2024-04-04 DIAGNOSIS — D50.9 IRON DEFICIENCY ANEMIA, UNSPECIFIED IRON DEFICIENCY ANEMIA TYPE: ICD-10-CM

## 2024-04-04 DIAGNOSIS — Z98.84 H/O GASTRIC BYPASS: ICD-10-CM

## 2024-04-04 DIAGNOSIS — D50.8 OTHER IRON DEFICIENCY ANEMIA: ICD-10-CM

## 2024-04-04 DIAGNOSIS — Z12.11 SCREENING FOR COLON CANCER: ICD-10-CM

## 2024-04-04 DIAGNOSIS — E55.9 VITAMIN D DEFICIENCY: ICD-10-CM

## 2024-04-04 LAB
25(OH)D3 SERPL-MCNC: 44.3 NG/ML (ref 30–100)
BASOPHILS # BLD AUTO: 0.03 THOUSANDS/ÂΜL (ref 0–0.1)
BASOPHILS NFR BLD AUTO: 1 % (ref 0–1)
EOSINOPHIL # BLD AUTO: 0.14 THOUSAND/ÂΜL (ref 0–0.61)
EOSINOPHIL NFR BLD AUTO: 2 % (ref 0–6)
ERYTHROCYTE [DISTWIDTH] IN BLOOD BY AUTOMATED COUNT: 20 % (ref 11.6–15.1)
FERRITIN SERPL-MCNC: 99 NG/ML (ref 11–307)
FOLATE SERPL-MCNC: 6.7 NG/ML
HCT VFR BLD AUTO: 40.8 % (ref 34.8–46.1)
HGB BLD-MCNC: 12.6 G/DL (ref 11.5–15.4)
IMM GRANULOCYTES # BLD AUTO: 0.01 THOUSAND/UL (ref 0–0.2)
IMM GRANULOCYTES NFR BLD AUTO: 0 % (ref 0–2)
IRON SATN MFR SERPL: 18 % (ref 15–50)
IRON SERPL-MCNC: 51 UG/DL (ref 50–212)
LYMPHOCYTES # BLD AUTO: 2.49 THOUSANDS/ÂΜL (ref 0.6–4.47)
LYMPHOCYTES NFR BLD AUTO: 40 % (ref 14–44)
MCH RBC QN AUTO: 29.5 PG (ref 26.8–34.3)
MCHC RBC AUTO-ENTMCNC: 30.9 G/DL (ref 31.4–37.4)
MCV RBC AUTO: 96 FL (ref 82–98)
MONOCYTES # BLD AUTO: 0.34 THOUSAND/ÂΜL (ref 0.17–1.22)
MONOCYTES NFR BLD AUTO: 5 % (ref 4–12)
NEUTROPHILS # BLD AUTO: 3.29 THOUSANDS/ÂΜL (ref 1.85–7.62)
NEUTS SEG NFR BLD AUTO: 52 % (ref 43–75)
NRBC BLD AUTO-RTO: 0 /100 WBCS
PLATELET # BLD AUTO: 281 THOUSANDS/UL (ref 149–390)
PMV BLD AUTO: 10.6 FL (ref 8.9–12.7)
RBC # BLD AUTO: 4.27 MILLION/UL (ref 3.81–5.12)
TIBC SERPL-MCNC: 281 UG/DL (ref 250–450)
UIBC SERPL-MCNC: 230 UG/DL (ref 155–355)
VIT B12 SERPL-MCNC: 481 PG/ML (ref 180–914)
WBC # BLD AUTO: 6.3 THOUSAND/UL (ref 4.31–10.16)

## 2024-04-04 PROCEDURE — 82746 ASSAY OF FOLIC ACID SERUM: CPT

## 2024-04-04 PROCEDURE — 83540 ASSAY OF IRON: CPT

## 2024-04-04 PROCEDURE — 88342 IMHCHEM/IMCYTCHM 1ST ANTB: CPT | Performed by: PATHOLOGY

## 2024-04-04 PROCEDURE — 82728 ASSAY OF FERRITIN: CPT

## 2024-04-04 PROCEDURE — 83550 IRON BINDING TEST: CPT

## 2024-04-04 PROCEDURE — 82306 VITAMIN D 25 HYDROXY: CPT

## 2024-04-04 PROCEDURE — 36415 COLL VENOUS BLD VENIPUNCTURE: CPT

## 2024-04-04 PROCEDURE — 82607 VITAMIN B-12: CPT

## 2024-04-04 PROCEDURE — 88305 TISSUE EXAM BY PATHOLOGIST: CPT | Performed by: PATHOLOGY

## 2024-04-04 PROCEDURE — 85025 COMPLETE CBC W/AUTO DIFF WBC: CPT

## 2024-04-04 PROCEDURE — 83918 ORGANIC ACIDS TOTAL QUANT: CPT

## 2024-04-04 NOTE — RESULT ENCOUNTER NOTE
EGD biopsy shows gastritis and H. pylori infection.  Please refer to our nurses for quadruple therapy and.  Check stool antigen.  Colon biopsy unremarkable..  Repeat screening colonoscopy in 10 years.  If any GI symptoms then patient should call our office for evaluation accordingly.

## 2024-04-05 ENCOUNTER — OFFICE VISIT (OUTPATIENT)
Dept: HEMATOLOGY ONCOLOGY | Facility: CLINIC | Age: 69
End: 2024-04-05
Payer: COMMERCIAL

## 2024-04-05 ENCOUNTER — NURSE TRIAGE (OUTPATIENT)
Age: 69
End: 2024-04-05

## 2024-04-05 VITALS
SYSTOLIC BLOOD PRESSURE: 138 MMHG | TEMPERATURE: 97.6 F | HEART RATE: 80 BPM | RESPIRATION RATE: 16 BRPM | DIASTOLIC BLOOD PRESSURE: 74 MMHG | WEIGHT: 201 LBS | BODY MASS INDEX: 40.52 KG/M2 | OXYGEN SATURATION: 98 % | HEIGHT: 59 IN

## 2024-04-05 DIAGNOSIS — E53.8 VITAMIN B12 DEFICIENCY: ICD-10-CM

## 2024-04-05 DIAGNOSIS — A04.8 H. PYLORI INFECTION: Primary | ICD-10-CM

## 2024-04-05 DIAGNOSIS — E53.8 FOLATE DEFICIENCY: Primary | ICD-10-CM

## 2024-04-05 DIAGNOSIS — K91.2 POSTSURGICAL MALABSORPTION: ICD-10-CM

## 2024-04-05 DIAGNOSIS — D50.9 IRON DEFICIENCY ANEMIA, UNSPECIFIED IRON DEFICIENCY ANEMIA TYPE: ICD-10-CM

## 2024-04-05 PROCEDURE — 99214 OFFICE O/P EST MOD 30 MIN: CPT | Performed by: PHYSICIAN ASSISTANT

## 2024-04-05 RX ORDER — METRONIDAZOLE 250 MG/1
250 TABLET ORAL EVERY 6 HOURS
Qty: 56 TABLET | Refills: 0 | Status: SHIPPED | OUTPATIENT
Start: 2024-04-05 | End: 2024-04-19

## 2024-04-05 RX ORDER — BISMUTH SUBSALICYLATE 262 MG/1
262 TABLET, CHEWABLE ORAL
Qty: 56 TABLET | Refills: 0 | Status: SHIPPED | OUTPATIENT
Start: 2024-04-05 | End: 2024-04-19

## 2024-04-05 RX ORDER — UREA 10 %
800 LOTION (ML) TOPICAL DAILY
Qty: 30 TABLET | Refills: 6 | Status: SHIPPED | OUTPATIENT
Start: 2024-04-05

## 2024-04-05 RX ORDER — TETRACYCLINE HYDROCHLORIDE 500 MG/1
500 CAPSULE ORAL 4 TIMES DAILY
Qty: 56 CAPSULE | Refills: 0 | Status: SHIPPED | OUTPATIENT
Start: 2024-04-05 | End: 2024-04-19

## 2024-04-05 RX ORDER — PANTOPRAZOLE SODIUM 40 MG/1
40 TABLET, DELAYED RELEASE ORAL
Qty: 28 TABLET | Refills: 0 | Status: SHIPPED | OUTPATIENT
Start: 2024-04-05 | End: 2024-04-19

## 2024-04-05 NOTE — PROGRESS NOTES
Hematology/Oncology Outpatient Follow- up Note  Rosalie Pelaez 68 y.o. female MRN: @ Encounter: [id number]        Date:  4/5/2024      Assessment / Plan:    Iron deficiency anemia    B12 deficiency.  She does not like taking sublingual B12.  She is taking oral B12.    Low normal folate.  Folic acid 800- 1000 mcg daily recommended    Easy bruising/bleeding.  We discussed she may have other vitamin deficiencies.    Repeat labs requested in 3-4 months with f/u thereafter.          HPI:  Rosaile Pelaez is a 68-year-old female seen initially 1/3/24 by Dr. Whatley regarding anemia.    Past medical history significant for hypercholesterolemia, fibromyalgia, vitamin D deficiency, osteopenia and diabetes. She has a history of gastric bypass surgery around 2012.     8/14/2020 hgb 12.4, MCV 97    3/23/21 hgb 10.9    5/14/22 hgb 10.6,, MCV 94 white blood cell count 5.8, normal differential, platelets 307    11/18/23 hemoglobin 8.3, MCV 83, platelets 397, normal white blood cell count and differential   She was advised by PCP increase iron pills to twice daily and referred to hematology and GI.    12/29/23 patient presented to the ER with dizziness and symptomatic anemia.   December 29, 2023 hemoglobin 8.7, platelets 445 .  Ferritin 7.  she received 1 unit PRBC transfusion. Hemoglobin on discharge on 12/31/2023 was 8.6 g/deciliter.     12/30/23  vitamin B12 level 151 and patient received 1 vitamin B12 IM injection and recommended oral supplementation.     Patient notes previously following with hematologist in [place] and has received IV iron in the past with good tolerance.    Interval History:    Venofer 200mg x 5 doses 1/2024 4/1/24 EGD and colonoscopy-sigmoid colon polyp was removed.  Small hemorrhoid noted.  Mild diverticulosis.  10-year colonoscopy recall recommended.  On EGD - Irregular Z-line   Healthy previous gastric bypass surgery in the stomach   Mild erythematous mucosa in the stomach; performed cold forceps  biopsy       4/4/24 hgb 12.6, MCV 96, platelet count 281, B12 481, ferritin 99, iron saturation 18%, folate 6.7    Review of Systems   Constitutional:  Negative for appetite change, chills, diaphoresis, fatigue, fever and unexpected weight change.   HENT:   Negative for mouth sores, nosebleeds, sore throat, tinnitus and voice change.    Eyes:  Negative for eye problems.   Respiratory:  Negative for chest tightness, cough, shortness of breath and wheezing.    Cardiovascular:  Negative for chest pain, leg swelling and palpitations.   Gastrointestinal:  Negative for abdominal distention, abdominal pain, blood in stool, constipation, diarrhea, nausea, rectal pain and vomiting.   Endocrine: Negative for hot flashes.   Genitourinary: Negative.     Musculoskeletal:  Negative for gait problem and myalgias.   Skin:  Negative for itching and rash.   Neurological:  Positive for light-headedness. Negative for dizziness, gait problem, headaches and numbness.   Hematological:  Negative for adenopathy.   Psychiatric/Behavioral:  Negative for confusion and sleep disturbance. The patient is not nervous/anxious.         Test Results:        Labs:   Lab Results   Component Value Date    HGB 12.6 04/04/2024    HCT 40.8 04/04/2024    MCV 96 04/04/2024     04/04/2024    WBC 6.30 04/04/2024    NRBC 0 04/04/2024    BANDSPCT 3 08/17/2020     Lab Results   Component Value Date    K 3.8 12/30/2023     12/30/2023    CO2 26 12/30/2023    BUN 10 12/30/2023    CREATININE 0.74 12/30/2023    GLUF 105 (H) 11/18/2023    CALCIUM 9.1 12/30/2023    AST 25 12/30/2023    ALT 13 12/30/2023    ALKPHOS 87 12/30/2023    EGFR 84 12/30/2023           Imaging: Colonoscopy    Result Date: 4/1/2024  Narrative: Table formatting from the original result was not included. [facility] [address] [place] [id number] [phone] DATE OF SERVICE: 4/01/24 PHYSICIAN(S): Attending: No Staff Documented Fellow: No Staff Documented  INDICATION: Iron deficiency anemia POST-OP DIAGNOSIS: See the impression below. HISTORY: Prior colonoscopy: 5 years ago. BOWEL PREPARATION: Golytely/Colyte/Trilyte PREPROCEDURE: Informed consent was obtained for the procedure, including sedation. Risks including but not limited to bleeding, infection, perforation, adverse drug reaction and aspiration were explained in detail. Also explained about less than 100% sensitivity with the exam and other alternatives. The patient was placed in the left lateral decubitus position. Procedure: Colonoscopy DETAILS OF PROCEDURE: Patient was taken to the procedure room where a time out was performed to confirm correct patient and correct procedure. The patient underwent monitored anesthesia care, which was administered by an anesthesia professional. The patient's blood pressure, heart rate, level of consciousness, oxygen, respirations, ECG and ETCO2 were monitored throughout the procedure. A digital rectal exam was performed. The scope was introduced through the anus and advanced to the cecum. Retroflexion was performed in the rectum. The quality of bowel preparation was evaluated using the [place] Bowel Preparation Scale with scores of: right colon = 2, transverse colon = 2, left colon = 2. The total BBPS score was 6. Bowel prep was adequate. The patient experienced no blood loss. The procedure was not difficult. The patient tolerated the procedure well. There were no apparent adverse events. ANESTHESIA INFORMATION: ASA: III Anesthesia Type: IV Sedation with Anesthesia MEDICATIONS: No administrations occurring from 1130 to 1158 on 04/01/24 FINDINGS: One 7 mm sessile polyp in the sigmoid colon; performed cold snare with complete en bloc removal and retrieved specimen Mild, localized erythematous mucosa in the proximal rectum; performed cold forceps biopsy One anterior internal small (grade 1) hemorrhoid observed during digital rectal exam, perianal exam and retroflexion; no  bleeding was identified Few small diverticula of mild severity in the sigmoid colon EVENTS: Procedure Events Event Event Time ENDO CECUM REACHED 4/1/2024 11:44 AM ENDO SCOPE OUT TIME 4/1/2024 11:55 AM SPECIMENS: ID Type Source Tests Collected by Time Destination 1 : Cold Biopsy Gastric Mucosa Tissue Stomach TISSUE EXAM Yousif Randall MD 4/1/2024 11:35 AM  2 : Cold Snare Polypectomy Mid Sigmoid Colon Tissue Large Intestine, Sigmoid Colon TISSUE EXAM Yousif Randall MD 4/1/2024 11:49 AM  3 : Cold biopsy Rectum/ Errythema Tissue Rectum TISSUE EXAM Yousif Randall MD 4/1/2024 11:54 AM  EQUIPMENT: Colonoscope -[id number]     Impression: Subcentimeter polyp in the sigmoid colon was removed with cold snare Mild erythematous mucosa in the proximal rectum; performed cold forceps biopsy Small (grade 1) hemorrhoid Diverticulosis of mild severity in the sigmoid colon RECOMMENDATION:  Repeat screening colonoscopy in 10 years  No significant findings on EGD colonoscopy to account for iron deficiency anemia.  Most likely malabsorption/nutritional from gastric bypass surgery.  Follow-up in our office if any evidence of GI blood loss and/or anemia persist after repletion.    No Staff Documented     EGD    Result Date: 4/1/2024  Narrative: Table formatting from the original result was not included. [facility] [address] [place] [id number] [phone] DATE OF SERVICE: 4/01/24 PHYSICIAN(S): Attending: No Staff Documented Fellow: No Staff Documented INDICATION: H/O gastric bypass, Iron deficiency anemia, unspecified iron deficiency anemia type POST-OP DIAGNOSIS: See the impression below. PREPROCEDURE: Informed consent was obtained for the procedure, including sedation.  Risks of perforation, hemorrhage, adverse drug reaction and aspiration were discussed. The patient was placed in the left lateral decubitus position. Patient was explained about the risks and benefits of the procedure. Risks including but not  limited to bleeding, infection, and perforation were explained in detail. Also explained about less than 100% sensitivity with the exam and other alternatives. PROCEDURE: EGD DETAILS OF PROCEDURE: Patient was taken to the procedure room where a time out was performed to confirm correct patient and correct procedure. The patient underwent monitored anesthesia care, which was administered by an anesthesia professional. The patient's blood pressure, heart rate, level of consciousness, respirations, oxygen, ECG and ETCO2 were monitored throughout the procedure. The scope was introduced through the mouth and advanced to the second part of the duodenum. Retroflexion was performed in the fundus. The patient experienced no blood loss. The procedure was not difficult. The patient tolerated the procedure well. There were no apparent adverse events. ANESTHESIA INFORMATION: ASA: III Anesthesia Type: IV Sedation with Anesthesia MEDICATIONS: No administrations occurring from 1130 to 1158 on 04/01/24 FINDINGS: Irregular Z-line 36 cm from the incisors Healthy previous gastric bypass surgery in the stomach. Small bowel loop appeared normal. Mild erythematous mucosa in the stomach; performed cold forceps biopsy. Mild erythema gastric pouch, biopsied. SPECIMENS: ID Type Source Tests Collected by Time Destination 1 : Cold Biopsy Gastric Mucosa Tissue Stomach TISSUE EXAM Yousif Randall MD 4/1/2024 11:35 AM  2 : Cold Snare Polypectomy Mid Sigmoid Colon Tissue Large Intestine, Sigmoid Colon TISSUE EXAM Yousif Randall MD 4/1/2024 11:49 AM  3 : Cold biopsy Rectum/ Errythema Tissue Rectum TISSUE EXAM Yousif Randall MD 4/1/2024 11:54 AM      Impression: Irregular Z-line Healthy previous gastric bypass surgery in the stomach Mild erythematous mucosa in the stomach; performed cold forceps biopsy RECOMMENDATION:  Await pathology results   No Staff Documented             Allergies: No Known Allergies  Current Medications: Reviewed  PMH/FH/SH:   "Reviewed      Physical Exam:    There is no height or weight on file to calculate BSA.    Ht Readings from Last 3 Encounters:   04/01/24 4' 11\" (1.499 m)   03/21/24 4' 11\" (1.499 m)   02/16/24 4' 11\" (1.499 m)        Wt Readings from Last 3 Encounters:   04/01/24 89.8 kg (198 lb)   03/21/24 90.7 kg (200 lb)   02/16/24 94.8 kg (209 lb)        Temp Readings from Last 3 Encounters:   04/01/24 (!) 96.9 °F (36.1 °C) (Temporal)   02/22/24 (!) 97.3 °F (36.3 °C) (Temporal)   02/01/24 (!) 96.8 °F (36 °C) (Temporal)        BP Readings from Last 3 Encounters:   04/01/24 144/65   02/22/24 139/75   02/16/24 140/81             Physical Exam  Vitals reviewed.   Constitutional:       General: She is not in acute distress.     Appearance: She is well-developed. She is not diaphoretic.   HENT:      Head: Normocephalic and atraumatic.      Comments: Poor dentition  Eyes:      Conjunctiva/sclera: Conjunctivae normal.   Neck:      Trachea: No tracheal deviation.   Cardiovascular:      Rate and Rhythm: Normal rate and regular rhythm.      Heart sounds: No murmur heard.     No friction rub. No gallop.   Pulmonary:      Effort: Pulmonary effort is normal. No respiratory distress.      Breath sounds: Normal breath sounds. No wheezing or rales.   Chest:      Chest wall: No tenderness.   Abdominal:      General: There is no distension.      Palpations: Abdomen is soft.      Tenderness: There is no abdominal tenderness.   Musculoskeletal:      Cervical back: Normal range of motion and neck supple.   Lymphadenopathy:      Cervical: No cervical adenopathy.   Skin:     General: Skin is warm and dry.      Coloration: Skin is not pale.      Findings: No erythema.   Neurological:      Mental Status: She is alert and oriented to person, place, and time.   Psychiatric:         Behavior: Behavior normal.         Thought Content: Thought content normal.         Judgment: Judgment normal.         Emergency Contacts:    Extended Emergency Contact " Information  Primary Emergency Contact: Mayo Britton  Mobile Phone: [phone]  Relation: Son  Secondary Emergency Contact: Elias Britton  Mobile Phone: [phone]  Relation: Nephew

## 2024-04-05 NOTE — TELEPHONE ENCOUNTER
"Pt returning call for results, I reviewed biopsy results and recommendations. I explained quad therapy antibx and to have stool study done in one month. Pt verbalized understanding no further questions.   Reason for Disposition   Information only question and nurse able to answer    Answer Assessment - Initial Assessment Questions  1. REASON FOR CALL or QUESTION: \"What is your reason for calling today?\" or \"How can I best help you?\" or \"What question do you have that I can help answer?\"      Biopsy results    Protocols used: Information Only Call - No Triage-ADULT-[place]    "

## 2024-04-12 LAB — METHYLMALONATE SERPL-SCNC: 220 NMOL/L (ref 0–378)

## 2024-04-13 ENCOUNTER — APPOINTMENT (OUTPATIENT)
Dept: LAB | Facility: HOSPITAL | Age: 69
End: 2024-04-13
Attending: FAMILY MEDICINE
Payer: COMMERCIAL

## 2024-04-13 DIAGNOSIS — K91.2 POSTSURGICAL MALABSORPTION: ICD-10-CM

## 2024-04-13 DIAGNOSIS — E78.00 HYPERCHOLESTEROLEMIA: ICD-10-CM

## 2024-04-13 LAB
ALBUMIN SERPL BCP-MCNC: 3.9 G/DL (ref 3.5–5)
ALP SERPL-CCNC: 71 U/L (ref 34–104)
ALT SERPL W P-5'-P-CCNC: 20 U/L (ref 7–52)
ANION GAP SERPL CALCULATED.3IONS-SCNC: 12 MMOL/L (ref 4–13)
AST SERPL W P-5'-P-CCNC: 33 U/L (ref 13–39)
BASOPHILS # BLD AUTO: 0.03 THOUSANDS/ÂΜL (ref 0–0.1)
BASOPHILS NFR BLD AUTO: 1 % (ref 0–1)
BILIRUB SERPL-MCNC: 0.42 MG/DL (ref 0.2–1)
BUN SERPL-MCNC: 8 MG/DL (ref 5–25)
CALCIUM SERPL-MCNC: 9.6 MG/DL (ref 8.4–10.2)
CHLORIDE SERPL-SCNC: 105 MMOL/L (ref 96–108)
CHOLEST SERPL-MCNC: 189 MG/DL
CO2 SERPL-SCNC: 24 MMOL/L (ref 21–32)
CREAT SERPL-MCNC: 0.77 MG/DL (ref 0.6–1.3)
EOSINOPHIL # BLD AUTO: 0.11 THOUSAND/ÂΜL (ref 0–0.61)
EOSINOPHIL NFR BLD AUTO: 2 % (ref 0–6)
ERYTHROCYTE [DISTWIDTH] IN BLOOD BY AUTOMATED COUNT: 18.9 % (ref 11.6–15.1)
FERRITIN SERPL-MCNC: 89 NG/ML (ref 11–307)
FOLATE SERPL-MCNC: 12.5 NG/ML
GFR SERPL CREATININE-BSD FRML MDRD: 80 ML/MIN/1.73SQ M
GLUCOSE P FAST SERPL-MCNC: 102 MG/DL (ref 65–99)
HCT VFR BLD AUTO: 39.9 % (ref 34.8–46.1)
HDLC SERPL-MCNC: 50 MG/DL
HGB BLD-MCNC: 12.8 G/DL (ref 11.5–15.4)
IMM GRANULOCYTES # BLD AUTO: 0.02 THOUSAND/UL (ref 0–0.2)
IMM GRANULOCYTES NFR BLD AUTO: 0 % (ref 0–2)
LDLC SERPL CALC-MCNC: 117 MG/DL (ref 0–100)
LYMPHOCYTES # BLD AUTO: 2.15 THOUSANDS/ÂΜL (ref 0.6–4.47)
LYMPHOCYTES NFR BLD AUTO: 36 % (ref 14–44)
MAGNESIUM SERPL-MCNC: 1.8 MG/DL (ref 1.9–2.7)
MCH RBC QN AUTO: 30 PG (ref 26.8–34.3)
MCHC RBC AUTO-ENTMCNC: 32.1 G/DL (ref 31.4–37.4)
MCV RBC AUTO: 93 FL (ref 82–98)
MONOCYTES # BLD AUTO: 0.36 THOUSAND/ÂΜL (ref 0.17–1.22)
MONOCYTES NFR BLD AUTO: 6 % (ref 4–12)
NEUTROPHILS # BLD AUTO: 3.37 THOUSANDS/ÂΜL (ref 1.85–7.62)
NEUTS SEG NFR BLD AUTO: 55 % (ref 43–75)
NONHDLC SERPL-MCNC: 139 MG/DL
NRBC BLD AUTO-RTO: 0 /100 WBCS
PLATELET # BLD AUTO: 265 THOUSANDS/UL (ref 149–390)
PMV BLD AUTO: 10.7 FL (ref 8.9–12.7)
POTASSIUM SERPL-SCNC: 3.5 MMOL/L (ref 3.5–5.3)
PROT SERPL-MCNC: 7 G/DL (ref 6.4–8.4)
RBC # BLD AUTO: 4.27 MILLION/UL (ref 3.81–5.12)
SODIUM SERPL-SCNC: 141 MMOL/L (ref 135–147)
TRIGL SERPL-MCNC: 108 MG/DL
VIT B12 SERPL-MCNC: 403 PG/ML (ref 180–914)
WBC # BLD AUTO: 6.04 THOUSAND/UL (ref 4.31–10.16)

## 2024-04-13 PROCEDURE — 36415 COLL VENOUS BLD VENIPUNCTURE: CPT

## 2024-04-13 PROCEDURE — 84425 ASSAY OF VITAMIN B-1: CPT

## 2024-04-13 PROCEDURE — 80053 COMPREHEN METABOLIC PANEL: CPT

## 2024-04-13 PROCEDURE — 83735 ASSAY OF MAGNESIUM: CPT

## 2024-04-13 PROCEDURE — 80061 LIPID PANEL: CPT

## 2024-04-13 PROCEDURE — 84252 ASSAY OF VITAMIN B-2: CPT

## 2024-04-13 PROCEDURE — 84207 ASSAY OF VITAMIN B-6: CPT

## 2024-04-13 PROCEDURE — 82728 ASSAY OF FERRITIN: CPT

## 2024-04-13 PROCEDURE — 84591 ASSAY OF NOS VITAMIN: CPT

## 2024-04-13 PROCEDURE — 85025 COMPLETE CBC W/AUTO DIFF WBC: CPT

## 2024-04-13 PROCEDURE — 83550 IRON BINDING TEST: CPT

## 2024-04-13 PROCEDURE — 84597 ASSAY OF VITAMIN K: CPT

## 2024-04-13 PROCEDURE — 84446 ASSAY OF VITAMIN E: CPT

## 2024-04-13 PROCEDURE — 82180 ASSAY OF ASCORBIC ACID: CPT

## 2024-04-13 PROCEDURE — 84590 ASSAY OF VITAMIN A: CPT

## 2024-04-13 PROCEDURE — 82746 ASSAY OF FOLIC ACID SERUM: CPT

## 2024-04-13 PROCEDURE — 82607 VITAMIN B-12: CPT

## 2024-04-13 PROCEDURE — 83540 ASSAY OF IRON: CPT

## 2024-04-14 LAB
IRON SATN MFR SERPL: 20 % (ref 15–50)
IRON SERPL-MCNC: 64 UG/DL (ref 50–212)
TIBC SERPL-MCNC: 314 UG/DL (ref 250–450)
UIBC SERPL-MCNC: 250 UG/DL (ref 155–355)

## 2024-04-15 ENCOUNTER — OFFICE VISIT (OUTPATIENT)
Dept: FAMILY MEDICINE CLINIC | Facility: CLINIC | Age: 69
End: 2024-04-15
Payer: COMMERCIAL

## 2024-04-15 VITALS
OXYGEN SATURATION: 97 % | HEART RATE: 76 BPM | DIASTOLIC BLOOD PRESSURE: 74 MMHG | WEIGHT: 199 LBS | RESPIRATION RATE: 20 BRPM | BODY MASS INDEX: 40.12 KG/M2 | HEIGHT: 59 IN | SYSTOLIC BLOOD PRESSURE: 130 MMHG

## 2024-04-15 DIAGNOSIS — E55.9 VITAMIN D DEFICIENCY: ICD-10-CM

## 2024-04-15 DIAGNOSIS — E78.00 HYPERCHOLESTEROLEMIA: ICD-10-CM

## 2024-04-15 DIAGNOSIS — E11.9 DIABETES MELLITUS WITHOUT COMPLICATION (HCC): Primary | ICD-10-CM

## 2024-04-15 DIAGNOSIS — E66.01 MORBID OBESITY DUE TO EXCESS CALORIES (HCC): ICD-10-CM

## 2024-04-15 DIAGNOSIS — D50.9 IRON DEFICIENCY ANEMIA, UNSPECIFIED IRON DEFICIENCY ANEMIA TYPE: ICD-10-CM

## 2024-04-15 DIAGNOSIS — M85.80 OSTEOPENIA, UNSPECIFIED LOCATION: ICD-10-CM

## 2024-04-15 LAB
LEFT EYE DIABETIC RETINOPATHY: NORMAL
LEFT EYE IMAGE QUALITY: NORMAL
LEFT EYE MACULAR EDEMA: NORMAL
LEFT EYE OTHER RETINOPATHY: NORMAL
RIGHT EYE DIABETIC RETINOPATHY: NORMAL
RIGHT EYE IMAGE QUALITY: NORMAL
RIGHT EYE MACULAR EDEMA: NORMAL
RIGHT EYE OTHER RETINOPATHY: NORMAL
SEVERITY (EYE EXAM): NORMAL
SL AMB POCT HEMOGLOBIN AIC: 5.9 (ref ?–6.5)
VIT B6 SERPL-MCNC: NORMAL UG/L

## 2024-04-15 PROCEDURE — 83036 HEMOGLOBIN GLYCOSYLATED A1C: CPT | Performed by: FAMILY MEDICINE

## 2024-04-15 PROCEDURE — 99214 OFFICE O/P EST MOD 30 MIN: CPT | Performed by: FAMILY MEDICINE

## 2024-04-15 NOTE — ASSESSMENT & PLAN NOTE
A1C done today and was 5.9. Continue metformin  mg bid. Advised pt to follow a low carb diet and to exercise on a regular basis. Foot exam done today. IRIS exam done today.     Lab Results   Component Value Date    HGBA1C 6.5 (H) 11/18/2023

## 2024-04-15 NOTE — PROGRESS NOTES
Assessment/Plan:         Problem List Items Addressed This Visit        Endocrine    Diabetes mellitus without complication (HCC) - Primary     A1C done today and was 5.9. Continue metformin  mg bid. Advised pt to follow a low carb diet and to exercise on a regular basis. Foot exam done today. IRIS exam done today.     Lab Results   Component Value Date    HGBA1C 6.5 (H) 11/18/2023          Relevant Orders    IRIS Diabetic eye exam    Albumin / creatinine urine ratio    POCT hemoglobin A1c (Completed)       Musculoskeletal and Integument    Osteopenia     Last dexascan was in July 2022. T-score was -2.3 in lumbar spine and left femoral neck. Pt advised to take calcium 1200 mg qd and Vit D 1000 U qd. Pt also advised to perform weight-bearing exercise on a regular basis. Recheck dexascan in July 2024.          Relevant Orders    DXA bone density spine hip and pelvis       Blood    Iron deficiency anemia     Hgb 12.8 and iron level ok in April 2024. Continue management per Hematology.            Other    Vitamin D deficiency     Level 44.3 in April 2024. Alternates 49014 U and 5000 U every other day.         Morbid obesity due to excess calories (HCC)     Discussed smaller portions, healthy snacks, and regular exercise.         Hypercholesterolemia      in April 2024. Advised pt to follow a low cholesterol diet and to exercise on a regular basis.               Subjective:      Patient ID: Rosalie Pelaez is a 68 y.o. female.    Patient here for follow-up DM, Hyperlipidemia, Anemia, Osteopenia, Vitamin D deficiency. Patient doing well. No chest pain or shortness of breath. No headaches. No abdominal pain. Patient has been seeing Hematology and getting iron infusions. Feels better. Not lightheaded as much. Follows low carbohydrate diet. No regular exercise. Takes Ca/D and due for dexascan in July 2024. No pain or new complaints. No recent eye exam. No numbness in feet.         The following portions of the  patient's history were reviewed and updated as appropriate:   Past Medical History:  She has a past medical history of Anemia (2020), Diabetes mellitus (HCC), Fibromyalgia, and Hyperlipidemia.,  _______________________________________________________________________  Medical Problems:  does not have any pertinent problems on file.,  _______________________________________________________________________  Past Surgical History:   has a past surgical history that includes Gastric bypass; Mediastinoscopy; Cholecystectomy; and Mammo (historical) (05/2012).,  _______________________________________________________________________  Family History:  family history includes COPD in her mother; Diabetes in her father, maternal aunt, and mother; Heart disease in her father; Hypertension in her father, maternal grandmother, and mother; Lung cancer (age of onset: 41) in her other; Stroke in her father.,  _______________________________________________________________________  Social History:   reports that she has never smoked. She has never used smokeless tobacco. She reports that she does not drink alcohol and does not use drugs.,  _______________________________________________________________________  Allergies:  has No Known Allergies..  _______________________________________________________________________  Current Outpatient Medications   Medication Sig Dispense Refill   • Calcium Carbonate-Vit D-Min (CALCIUM 1200 PO) Take by mouth     • Cholecalciferol (Vitamin D3) 125 MCG (5000 UT) TABS Take 5,000 Units by mouth every other day Alternating 5000 and 96608 every other day     • cyclobenzaprine (FLEXERIL) 5 mg tablet take 1 tablet by mouth daily at bedtime 30 tablet 5   • folic acid (FOLVITE) 800 MCG tablet Take 1 tablet (800 mcg total) by mouth daily 30 tablet 6   • glucose blood test strip Check blood sugar once a day 100 strip 5   • Lancets (onetouch ultrasoft) lancets Check blood sugar once a day 100 each 5   •  metFORMIN (GLUCOPHAGE-XR) 500 mg 24 hr tablet Take 1 tablet by mouth 2 times a day with meals 60 tablet 5   • multivitamin (THERAGRAN) TABS Take 1 tablet by mouth daily     • pantoprazole (PROTONIX) 40 mg tablet Take 1 tablet (40 mg total) by mouth 2 (two) times a day before meals for 14 days 28 tablet 0   • vitamin B-12 (CYANOCOBALAMIN) 500 MCG TABS Take 1 tablet (500 mcg total) by mouth daily 30 tablet 0   • bismuth subsalicylate (PEPTO BISMOL) 262 MG chewable tablet Chew 1 tablet (262 mg total) 4 (four) times a day (before meals and at bedtime) for 14 days (Patient not taking: Reported on 4/15/2024) 56 tablet 0   • metroNIDAZOLE (FLAGYL) 250 mg tablet Take 1 tablet (250 mg total) by mouth every 6 (six) hours for 14 days Take with food (Patient not taking: Reported on 4/15/2024) 56 tablet 0   • polyethylene glycol (Golytely) 4000 mL solution Take 4,000 mL by mouth once for 1 dose Take according to instructions given by the office for colonoscopy bowel prep. (Patient not taking: Reported on 4/15/2024) 4000 mL 0   • tetracycline (ACHROMYCIN,SUMYCIN) 500 MG capsule Take 1 capsule (500 mg total) by mouth 4 (four) times a day for 14 days Take with food (Patient not taking: Reported on 4/15/2024) 56 capsule 0     No current facility-administered medications for this visit.     _______________________________________________________________________  Review of Systems   Constitutional:  Negative for fatigue.   Eyes:  Negative for visual disturbance.   Respiratory:  Negative for cough and shortness of breath.    Cardiovascular:  Negative for chest pain.   Gastrointestinal:  Negative for abdominal pain, constipation, diarrhea, nausea and vomiting.   Endocrine: Negative for polydipsia, polyphagia and polyuria.   Genitourinary:  Negative for difficulty urinating.   Musculoskeletal:  Negative for arthralgias and gait problem.   Skin:  Negative for wound.   Neurological:  Negative for dizziness, numbness and headaches.      "    Objective:  Vitals:    04/15/24 1413   BP: 130/74   BP Location: Left arm   Patient Position: Sitting   Cuff Size: Large   Pulse: 76   Resp: 20   SpO2: 97%   Weight: 90.3 kg (199 lb)   Height: 4' 11\" (1.499 m)     Body mass index is 40.19 kg/m².     Physical Exam  Vitals and nursing note reviewed.   Constitutional:       Appearance: Normal appearance. She is well-developed. She is obese.   HENT:      Head: Normocephalic and atraumatic.   Cardiovascular:      Rate and Rhythm: Normal rate and regular rhythm.      Pulses: no weak pulses.      Heart sounds: Normal heart sounds. No murmur heard.  Pulmonary:      Effort: Pulmonary effort is normal. No respiratory distress.      Breath sounds: Normal breath sounds. No wheezing.   Musculoskeletal:      Cervical back: Normal range of motion and neck supple.      Right lower leg: No edema.      Left lower leg: No edema.   Feet:      Right foot:      Skin integrity: No ulcer, skin breakdown, erythema, warmth, callus or dry skin.      Left foot:      Skin integrity: No ulcer, skin breakdown, erythema, warmth, callus or dry skin.   Lymphadenopathy:      Cervical: No cervical adenopathy.   Neurological:      Mental Status: She is alert and oriented to person, place, and time.   Psychiatric:         Mood and Affect: Mood normal.         Behavior: Behavior normal.         Thought Content: Thought content normal.         Judgment: Judgment normal.     Patient's shoes and socks removed.    Right Foot/Ankle   Right Foot Inspection  Skin Exam: skin normal and skin intact. No dry skin, no warmth, no callus, no erythema, no maceration, no abnormal color, no pre-ulcer, no ulcer and no callus.     Sensory   Proprioception: intact      Vascular  Capillary refills: < 3 seconds      Left Foot/Ankle  Left Foot Inspection  Skin Exam: skin normal and skin intact. No dry skin, no warmth, no erythema, no maceration, normal color, no pre-ulcer, no ulcer and no callus.     Sensory "   Proprioception: intact      Vascular  Capillary refills: < 3 seconds      Assign Risk Category  No deformity present  No loss of protective sensation  No weak pulses  Risk: 0

## 2024-04-16 LAB — VIT C SERPL-MCNC: 0.2 MG/DL (ref 0.4–2)

## 2024-04-17 LAB
A-TOCOPHEROL VIT E SERPL-MCNC: 4.7 MG/L (ref 9–29)
GAMMA TOCOPHEROL SERPL-MCNC: 1.9 MG/L (ref 0.5–4.9)
PHYTONADIONE SERPL-MCNC: 0.14 NG/ML (ref 0.1–2.2)
VIT A SERPL-MCNC: 15.7 UG/DL (ref 22–69.5)
VIT B1 BLD-SCNC: 112.3 NMOL/L (ref 66.5–200)

## 2024-04-19 LAB — VIT B2 BLD-MCNC: 228 UG/L (ref 137–370)

## 2024-04-22 LAB
NIACIN SERPL-MCNC: <5 NG/ML (ref 0–5)
NICOTINAMIDE SERPL-MCNC: 22.9 NG/ML (ref 5.2–72.1)

## 2024-04-25 ENCOUNTER — TELEPHONE (OUTPATIENT)
Dept: HEMATOLOGY ONCOLOGY | Facility: CLINIC | Age: 69
End: 2024-04-25

## 2024-04-25 NOTE — TELEPHONE ENCOUNTER
Patient Call    Who are you speaking with? Patient    If it is not the patient, are they listed on an active communication consent form? N/A   What is the reason for this call? Pt cannot make it to infusion today because she is sick and the elevator in her building is broken   Does this require a call back? Yes   If a call back is required, please list [facility] call back number [phone]    If a call back is required, advise that a message will be forwarded to their care team and someone will return their call as soon as possible.   Did you relay this information to the patient? Yes

## 2024-05-01 LAB — MISCELLANEOUS LAB TEST RESULT: NORMAL

## 2024-05-03 LAB — VIT B6 SERPL-MCNC: NORMAL UG/L

## 2024-07-16 ENCOUNTER — TELEPHONE (OUTPATIENT)
Age: 69
End: 2024-07-16

## 2024-07-16 DIAGNOSIS — M25.50 MULTIPLE JOINT PAIN: Primary | ICD-10-CM

## 2024-07-16 RX ORDER — NAPROXEN 500 MG/1
500 TABLET ORAL 2 TIMES DAILY WITH MEALS
Qty: 30 TABLET | Refills: 0 | Status: SHIPPED | OUTPATIENT
Start: 2024-07-16

## 2024-07-16 NOTE — TELEPHONE ENCOUNTER
Patient was taken off Naproxen prior to colonoscopy and asked if doctor would write another script for back pain since the test came back negative.

## 2024-08-10 DIAGNOSIS — E11.9 DIABETES MELLITUS WITHOUT COMPLICATION (HCC): ICD-10-CM

## 2024-08-10 RX ORDER — METFORMIN HYDROCHLORIDE 500 MG/1
TABLET, EXTENDED RELEASE ORAL
Qty: 60 TABLET | Refills: 0 | Status: SHIPPED | OUTPATIENT
Start: 2024-08-10

## 2024-09-08 DIAGNOSIS — E11.9 DIABETES MELLITUS WITHOUT COMPLICATION (HCC): ICD-10-CM

## 2024-09-08 RX ORDER — METFORMIN HCL 500 MG
TABLET, EXTENDED RELEASE 24 HR ORAL
Qty: 60 TABLET | Refills: 5 | Status: SHIPPED | OUTPATIENT
Start: 2024-09-08

## 2024-09-16 DIAGNOSIS — R52 PAIN: ICD-10-CM

## 2024-09-16 RX ORDER — CYCLOBENZAPRINE HCL 5 MG
5 TABLET ORAL
Qty: 30 TABLET | Refills: 0 | Status: SHIPPED | OUTPATIENT
Start: 2024-09-16

## 2024-09-23 ENCOUNTER — TELEPHONE (OUTPATIENT)
Age: 69
End: 2024-09-23

## 2024-09-23 NOTE — TELEPHONE ENCOUNTER
Patient is requesting a juror excuse note due to stomach issues Please include juror ID # [id number]  ATTN Jury  office fax # [phone] Please advise patient if pcp is agreeable and faxed

## 2024-09-25 ENCOUNTER — TELEPHONE (OUTPATIENT)
Dept: HEMATOLOGY ONCOLOGY | Facility: CLINIC | Age: 69
End: 2024-09-25

## 2024-09-25 NOTE — TELEPHONE ENCOUNTER
Patient was scheduled to see Elayne Randle on 9/30 and canceled due to needing an afternoon appointment and not being able to come in the morning. Patient wanted to reschedule to the afternoon, next available isn't till January and patient declined stating that is too far out. Per patient will be getting labs done this week and will like a call back to go over results. Per patient does not want to wait till January to be seen,       Thanks!

## 2024-09-30 ENCOUNTER — APPOINTMENT (OUTPATIENT)
Dept: LAB | Facility: HOSPITAL | Age: 69
End: 2024-09-30
Attending: FAMILY MEDICINE
Payer: COMMERCIAL

## 2024-09-30 DIAGNOSIS — D50.9 IRON DEFICIENCY ANEMIA, UNSPECIFIED IRON DEFICIENCY ANEMIA TYPE: ICD-10-CM

## 2024-09-30 DIAGNOSIS — E50.9 VITAMIN A DEFICIENCY: ICD-10-CM

## 2024-09-30 DIAGNOSIS — E54 VITAMIN C DEFICIENCY: ICD-10-CM

## 2024-09-30 DIAGNOSIS — E53.8 FOLATE DEFICIENCY: ICD-10-CM

## 2024-09-30 DIAGNOSIS — E53.8 VITAMIN B12 DEFICIENCY: ICD-10-CM

## 2024-09-30 DIAGNOSIS — E56.0 VITAMIN E DEFICIENCY: ICD-10-CM

## 2024-09-30 DIAGNOSIS — E11.9 DIABETES MELLITUS WITHOUT COMPLICATION (HCC): ICD-10-CM

## 2024-09-30 DIAGNOSIS — K91.2 POSTSURGICAL MALABSORPTION: ICD-10-CM

## 2024-09-30 LAB
BASOPHILS # BLD AUTO: 0.05 THOUSANDS/ÂΜL (ref 0–0.1)
BASOPHILS NFR BLD AUTO: 1 % (ref 0–1)
EOSINOPHIL # BLD AUTO: 0.15 THOUSAND/ÂΜL (ref 0–0.61)
EOSINOPHIL NFR BLD AUTO: 3 % (ref 0–6)
ERYTHROCYTE [DISTWIDTH] IN BLOOD BY AUTOMATED COUNT: 13.2 % (ref 11.6–15.1)
FERRITIN SERPL-MCNC: 100 NG/ML (ref 11–307)
HCT VFR BLD AUTO: 44.1 % (ref 34.8–46.1)
HGB BLD-MCNC: 14.2 G/DL (ref 11.5–15.4)
IMM GRANULOCYTES # BLD AUTO: 0 THOUSAND/UL (ref 0–0.2)
IMM GRANULOCYTES NFR BLD AUTO: 0 % (ref 0–2)
IRON SATN MFR SERPL: 20 % (ref 15–50)
IRON SERPL-MCNC: 69 UG/DL (ref 50–212)
LYMPHOCYTES # BLD AUTO: 2.55 THOUSANDS/ÂΜL (ref 0.6–4.47)
LYMPHOCYTES NFR BLD AUTO: 45 % (ref 14–44)
MCH RBC QN AUTO: 32.1 PG (ref 26.8–34.3)
MCHC RBC AUTO-ENTMCNC: 32.2 G/DL (ref 31.4–37.4)
MCV RBC AUTO: 100 FL (ref 82–98)
MONOCYTES # BLD AUTO: 0.39 THOUSAND/ÂΜL (ref 0.17–1.22)
MONOCYTES NFR BLD AUTO: 7 % (ref 4–12)
NEUTROPHILS # BLD AUTO: 2.56 THOUSANDS/ÂΜL (ref 1.85–7.62)
NEUTS SEG NFR BLD AUTO: 44 % (ref 43–75)
NRBC BLD AUTO-RTO: 0 /100 WBCS
PLATELET # BLD AUTO: 290 THOUSANDS/UL (ref 149–390)
PMV BLD AUTO: 9.9 FL (ref 8.9–12.7)
RBC # BLD AUTO: 4.42 MILLION/UL (ref 3.81–5.12)
TIBC SERPL-MCNC: 342 UG/DL (ref 250–450)
UIBC SERPL-MCNC: 273 UG/DL (ref 155–355)
VIT B12 SERPL-MCNC: 260 PG/ML (ref 180–914)
WBC # BLD AUTO: 5.7 THOUSAND/UL (ref 4.31–10.16)

## 2024-09-30 PROCEDURE — 82728 ASSAY OF FERRITIN: CPT

## 2024-09-30 PROCEDURE — 83550 IRON BINDING TEST: CPT

## 2024-09-30 PROCEDURE — 82180 ASSAY OF ASCORBIC ACID: CPT

## 2024-09-30 PROCEDURE — 36415 COLL VENOUS BLD VENIPUNCTURE: CPT

## 2024-09-30 PROCEDURE — 83540 ASSAY OF IRON: CPT

## 2024-09-30 PROCEDURE — 84446 ASSAY OF VITAMIN E: CPT

## 2024-09-30 PROCEDURE — 82607 VITAMIN B-12: CPT

## 2024-09-30 PROCEDURE — 85025 COMPLETE CBC W/AUTO DIFF WBC: CPT

## 2024-09-30 PROCEDURE — 84590 ASSAY OF VITAMIN A: CPT

## 2024-09-30 PROCEDURE — 84597 ASSAY OF VITAMIN K: CPT

## 2024-10-03 LAB — VIT C SERPL-MCNC: 0.1 MG/DL (ref 0.4–2)

## 2024-10-04 LAB — PHYTONADIONE SERPL-MCNC: 0.28 NG/ML (ref 0.1–2.2)

## 2024-10-05 LAB
A-TOCOPHEROL VIT E SERPL-MCNC: 4.8 MG/L (ref 9–29)
GAMMA TOCOPHEROL SERPL-MCNC: 1.6 MG/L (ref 0.5–4.9)
VIT A SERPL-MCNC: 28.8 UG/DL (ref 22–69.5)

## 2024-10-15 ENCOUNTER — OFFICE VISIT (OUTPATIENT)
Dept: FAMILY MEDICINE CLINIC | Facility: CLINIC | Age: 69
End: 2024-10-15
Payer: COMMERCIAL

## 2024-10-15 VITALS
RESPIRATION RATE: 16 BRPM | BODY MASS INDEX: 37.09 KG/M2 | OXYGEN SATURATION: 98 % | WEIGHT: 184 LBS | DIASTOLIC BLOOD PRESSURE: 76 MMHG | SYSTOLIC BLOOD PRESSURE: 134 MMHG | HEART RATE: 72 BPM | HEIGHT: 59 IN

## 2024-10-15 DIAGNOSIS — D50.9 IRON DEFICIENCY ANEMIA, UNSPECIFIED IRON DEFICIENCY ANEMIA TYPE: ICD-10-CM

## 2024-10-15 DIAGNOSIS — E78.00 HYPERCHOLESTEROLEMIA: ICD-10-CM

## 2024-10-15 DIAGNOSIS — E55.9 VITAMIN D DEFICIENCY: ICD-10-CM

## 2024-10-15 DIAGNOSIS — E11.9 DIABETES MELLITUS WITHOUT COMPLICATION (HCC): Primary | ICD-10-CM

## 2024-10-15 DIAGNOSIS — M85.80 OSTEOPENIA, UNSPECIFIED LOCATION: ICD-10-CM

## 2024-10-15 LAB — SL AMB POCT HEMOGLOBIN AIC: 6.1 (ref ?–6.5)

## 2024-10-15 PROCEDURE — 99214 OFFICE O/P EST MOD 30 MIN: CPT | Performed by: FAMILY MEDICINE

## 2024-10-15 PROCEDURE — 83036 HEMOGLOBIN GLYCOSYLATED A1C: CPT | Performed by: FAMILY MEDICINE

## 2024-10-15 NOTE — ASSESSMENT & PLAN NOTE
A1C done today and was 6.1. Continue metformin  mg bid. Advised pt to follow a low carb diet and to exercise on a regular basis. Foot exam done in April 2024. IRIS exam done in April 2024.     Lab Results   Component Value Date    HGBA1C 6.1 10/15/2024       Orders:    POCT hemoglobin A1c    Albumin / creatinine urine ratio; Future

## 2024-10-15 NOTE — ASSESSMENT & PLAN NOTE
Hgb 14.2 and iron level ok in September 2024. Continue management per Hematology and next appointment is on 10/31/24.

## 2024-10-15 NOTE — ASSESSMENT & PLAN NOTE
Level was 44.3 in April 2024. Alternates 10198 U and 5000 U every other day.    Orders:    Vitamin D 25 hydroxy; Future

## 2024-10-15 NOTE — ASSESSMENT & PLAN NOTE
Recheck lipids and discussed starting statin if LDL > 100. Advised pt to follow a low cholesterol diet and to exercise on a regular basis.     Orders:    Lipid panel; Future

## 2024-10-15 NOTE — PROGRESS NOTES
Ambulatory Visit  Name: Rosalie Pelaez      : 1955      MRN: [id number]  Encounter Provider: Juan Blanca MD  Encounter Date: 10/15/2024   Encounter department: [facility]    Assessment & Plan  Diabetes mellitus without complication (HCC)  A1C done today and was 6.1. Continue metformin  mg bid. Advised pt to follow a low carb diet and to exercise on a regular basis. Foot exam done in 2024. IRIS exam done in 2024.     Lab Results   Component Value Date    HGBA1C 6.1 10/15/2024       Orders:    POCT hemoglobin A1c    Albumin / creatinine urine ratio; Future    Hypercholesterolemia  Recheck lipids and discussed starting statin if LDL > 100. Advised pt to follow a low cholesterol diet and to exercise on a regular basis.     Orders:    Lipid panel; Future    Osteopenia, unspecified location  Last dexascan was in 2022. T-score was -2.3 in lumbar spine and left femoral neck. Pt advised to take calcium 1200 mg qd and Vit D 1000 U qd. Pt also advised to perform weight-bearing exercise on a regular basis. Recheck dexascan.         Vitamin D deficiency  Level was 44.3 in 2024. Alternates 62796 U and 5000 U every other day.    Orders:    Vitamin D 25 hydroxy; Future    Iron deficiency anemia, unspecified iron deficiency anemia type  Hgb 14.2 and iron level ok in 2024. Continue management per Hematology and next appointment is on 10/31/24.           Depression Screening and Follow-up Plan: Patient was screened for depression during today's encounter. They screened negative with a PHQ-2 score of 0.        History of Present Illness     Patient here for follow-up DM, Hyperlipidemia, Osteopenia, Vitamin D deficiency, Anemia. Patient doing ok. No chest pain or shortness of breath. No headaches. No abdominal pain. Patient has been watching diet and lost 15 lbs. Patient walks at times. Tries to limit carbohydrates. Patient sees Hematology in 2 weeks. Had labs  and iron level has been better. No pain or new complaints.       Review of Systems   Constitutional:  Negative for fatigue.   Eyes:  Negative for visual disturbance.   Respiratory:  Negative for cough and shortness of breath.    Cardiovascular:  Negative for chest pain.   Gastrointestinal:  Negative for abdominal pain, constipation, diarrhea, nausea and vomiting.   Endocrine: Negative for polydipsia, polyphagia and polyuria.   Genitourinary:  Negative for difficulty urinating.   Musculoskeletal:  Negative for arthralgias and gait problem.   Skin:  Negative for wound.   Neurological:  Negative for dizziness, numbness and headaches.     Past Medical History:   Diagnosis Date    Anemia 2020    Diabetes mellitus (HCC)     Fibromyalgia     Hyperlipidemia      Past Surgical History:   Procedure Laterality Date    CHOLECYSTECTOMY      GASTRIC BYPASS      MAMMO (HISTORICAL)  05/2012    MEDIASTINOSCOPY       Family History   Problem Relation Age of Onset    Hypertension Mother     Diabetes Mother     COPD Mother     Stroke Father     Heart disease Father     Diabetes Father     Hypertension Father     Hypertension Maternal Grandmother     Diabetes Maternal Aunt     Lung cancer Other 41     Social History     Tobacco Use    Smoking status: Never    Smokeless tobacco: Never   Vaping Use    Vaping status: Never Used   Substance and Sexual Activity    Alcohol use: Never    Drug use: Never     Comment: Hx: Over 30 years ago.     Sexual activity: Not Currently     Current Outpatient Medications on File Prior to Visit   Medication Sig    ascorbic acid (VITAMIN C) 250 mg tablet Take 1 tablet (250 mg total) by mouth daily    Calcium Carbonate-Vit D-Min (CALCIUM 1200 PO) Take by mouth    Cholecalciferol (Vitamin D3) 125 MCG (5000 UT) TABS Take 5,000 Units by mouth every other day Alternating 5000 and 09797 every other day    cyclobenzaprine (FLEXERIL) 5 mg tablet take 1 tablet by mouth daily at bedtime    folic acid (FOLVITE) 800 MCG  "tablet Take 1 tablet (800 mcg total) by mouth daily    glucose blood test strip Check blood sugar once a day    Lancets (onetouch ultrasoft) lancets Check blood sugar once a day    metFORMIN (GLUCOPHAGE-XR) 500 mg 24 hr tablet Take 1 tablet by mouth 2 times a day with meals    naproxen (Naprosyn) 500 mg tablet Take 1 tablet (500 mg total) by mouth 2 (two) times a day with meals    pantoprazole (PROTONIX) 40 mg tablet Take 1 tablet (40 mg total) by mouth 2 (two) times a day before meals for 14 days    vitamin A 3 MG (39522 UT) capsule Take 1 capsule (10,000 Units total) by mouth daily    vitamin B-12 (CYANOCOBALAMIN) 500 MCG TABS Take 1 tablet (500 mcg total) by mouth daily    vitamin E 100 UNIT capsule Take 1 capsule (100 Units total) by mouth daily    [DISCONTINUED] multivitamin (THERAGRAN) TABS Take 1 tablet by mouth daily (Patient not taking: Reported on 10/15/2024)    [DISCONTINUED] polyethylene glycol (Golytely) 4000 mL solution Take 4,000 mL by mouth once for 1 dose Take according to instructions given by the office for colonoscopy bowel prep. (Patient not taking: Reported on 4/15/2024)     No Known Allergies  Immunization History   Administered Date(s) Administered    COVID-19 MODERNA VACC 0.5 ML IM 03/17/2021, 04/14/2021    COVID-19 Moderna Vac BIVALENT 12 Yr+ IM 0.5 ML 11/16/2022    Pneumococcal Polysaccharide PPV23 03/28/2022    Tdap 03/28/2022    influenza, injectable, quadrivalent 10/01/2019     Objective     /76 (BP Location: Left arm, Patient Position: Sitting, Cuff Size: Large)   Pulse 72   Resp 16   Ht 4' 11\" (1.499 m)   Wt 83.5 kg (184 lb)   SpO2 98%   BMI 37.16 kg/m²     Physical Exam  Vitals and nursing note reviewed.   Constitutional:       General: She is not in acute distress.     Appearance: Normal appearance. She is well-developed. She is obese.   Neck:      Vascular: No carotid bruit.   Cardiovascular:      Rate and Rhythm: Normal rate and regular rhythm.      Heart sounds: No " murmur heard.  Pulmonary:      Effort: Pulmonary effort is normal. No respiratory distress.      Breath sounds: Normal breath sounds.   Musculoskeletal:      Cervical back: Normal range of motion and neck supple.      Right lower leg: No edema.      Left lower leg: No edema.   Lymphadenopathy:      Cervical: No cervical adenopathy.   Neurological:      Mental Status: She is alert.   Psychiatric:         Mood and Affect: Mood normal.         Behavior: Behavior normal.         Thought Content: Thought content normal.         Judgment: Judgment normal.

## 2024-10-15 NOTE — ASSESSMENT & PLAN NOTE
Last dexascan was in July 2022. T-score was -2.3 in lumbar spine and left femoral neck. Pt advised to take calcium 1200 mg qd and Vit D 1000 U qd. Pt also advised to perform weight-bearing exercise on a regular basis. Recheck dexascan.

## 2024-10-26 DIAGNOSIS — R52 PAIN: ICD-10-CM

## 2024-10-28 RX ORDER — CYCLOBENZAPRINE HCL 5 MG
5 TABLET ORAL
Qty: 30 TABLET | Refills: 0 | Status: SHIPPED | OUTPATIENT
Start: 2024-10-28

## 2025-01-09 ENCOUNTER — TELEPHONE (OUTPATIENT)
Age: 70
End: 2025-01-09

## 2025-01-09 NOTE — TELEPHONE ENCOUNTER
Please have Dr. Blanca place order for mammo for the patient.  She was requesting to schedule after the order is placed. Thank you.

## 2025-01-10 DIAGNOSIS — Z12.31 SCREENING MAMMOGRAM FOR BREAST CANCER: Primary | ICD-10-CM

## 2025-01-27 DIAGNOSIS — R52 PAIN: ICD-10-CM

## 2025-01-27 RX ORDER — CYCLOBENZAPRINE HCL 5 MG
5 TABLET ORAL
Qty: 30 TABLET | Refills: 0 | Status: SHIPPED | OUTPATIENT
Start: 2025-01-27

## 2025-02-14 DIAGNOSIS — M25.50 MULTIPLE JOINT PAIN: ICD-10-CM

## 2025-02-14 RX ORDER — NAPROXEN 500 MG/1
500 TABLET ORAL 2 TIMES DAILY WITH MEALS
Qty: 30 TABLET | Refills: 0 | Status: SHIPPED | OUTPATIENT
Start: 2025-02-14

## 2025-02-19 ENCOUNTER — RESULTS FOLLOW-UP (OUTPATIENT)
Dept: FAMILY MEDICINE CLINIC | Facility: CLINIC | Age: 70
End: 2025-02-19

## 2025-02-19 ENCOUNTER — HOSPITAL ENCOUNTER (OUTPATIENT)
Facility: HOSPITAL | Age: 70
Discharge: HOME | End: 2025-02-19
Attending: FAMILY MEDICINE
Payer: COMMERCIAL

## 2025-02-19 DIAGNOSIS — Z12.31 SCREENING MAMMOGRAM FOR BREAST CANCER: ICD-10-CM

## 2025-02-19 PROCEDURE — 77067 SCR MAMMO BI INCL CAD: CPT

## 2025-02-19 PROCEDURE — 77063 BREAST TOMOSYNTHESIS BI: CPT

## 2025-02-25 DIAGNOSIS — R52 PAIN: ICD-10-CM

## 2025-02-26 RX ORDER — CYCLOBENZAPRINE HCL 5 MG
5 TABLET ORAL
Qty: 30 TABLET | Refills: 0 | Status: SHIPPED | OUTPATIENT
Start: 2025-02-26

## 2025-03-09 DIAGNOSIS — E11.9 DIABETES MELLITUS WITHOUT COMPLICATION (HCC): ICD-10-CM

## 2025-03-10 RX ORDER — METFORMIN HYDROCHLORIDE 500 MG/1
TABLET, EXTENDED RELEASE ORAL
Qty: 60 TABLET | Refills: 5 | Status: SHIPPED | OUTPATIENT
Start: 2025-03-10

## 2025-03-17 DIAGNOSIS — M25.50 MULTIPLE JOINT PAIN: ICD-10-CM

## 2025-03-18 RX ORDER — NAPROXEN 500 MG/1
500 TABLET ORAL 2 TIMES DAILY WITH MEALS
Qty: 30 TABLET | Refills: 5 | Status: SHIPPED | OUTPATIENT
Start: 2025-03-18

## 2025-03-25 DIAGNOSIS — R52 PAIN: ICD-10-CM

## 2025-03-26 RX ORDER — CYCLOBENZAPRINE HCL 5 MG
5 TABLET ORAL
Qty: 30 TABLET | Refills: 0 | Status: SHIPPED | OUTPATIENT
Start: 2025-03-26

## 2025-04-07 ENCOUNTER — TELEPHONE (OUTPATIENT)
Age: 70
End: 2025-04-07

## 2025-04-07 DIAGNOSIS — M85.80 OSTEOPENIA, UNSPECIFIED LOCATION: Primary | ICD-10-CM

## 2025-04-07 DIAGNOSIS — E11.9 DIABETES MELLITUS WITHOUT COMPLICATION (HCC): Primary | ICD-10-CM

## 2025-04-07 DIAGNOSIS — Z13.820 SCREENING FOR OSTEOPOROSIS: ICD-10-CM

## 2025-04-07 NOTE — TELEPHONE ENCOUNTER
Patient called asking PCP to place new order for Dexa Scan as she could not go through with recent scan due to diarrhea. She mentioned the order expires on 4/14/25. She is asking for a return phone call once new order is placed so she can reschedule the scan. Should she keep this appointment with PCP or wait until after scan?

## 2025-04-14 ENCOUNTER — RESULTS FOLLOW-UP (OUTPATIENT)
Dept: FAMILY MEDICINE CLINIC | Facility: CLINIC | Age: 70
End: 2025-04-14

## 2025-04-14 ENCOUNTER — APPOINTMENT (OUTPATIENT)
Dept: LAB | Facility: CLINIC | Age: 70
End: 2025-04-14
Payer: COMMERCIAL

## 2025-04-14 DIAGNOSIS — E55.9 VITAMIN D DEFICIENCY: ICD-10-CM

## 2025-04-14 DIAGNOSIS — E78.00 HYPERCHOLESTEROLEMIA: ICD-10-CM

## 2025-04-14 DIAGNOSIS — E11.9 DIABETES MELLITUS WITHOUT COMPLICATION (HCC): ICD-10-CM

## 2025-04-14 LAB
25(OH)D3 SERPL-MCNC: 55.8 NG/ML (ref 30–100)
CHOLEST SERPL-MCNC: 268 MG/DL (ref ?–200)
EST. AVERAGE GLUCOSE BLD GHB EST-MCNC: 148 MG/DL
HBA1C MFR BLD: 6.8 %
HDLC SERPL-MCNC: 70 MG/DL
LDLC SERPL CALC-MCNC: 168 MG/DL (ref 0–100)
NONHDLC SERPL-MCNC: 198 MG/DL
TRIGL SERPL-MCNC: 151 MG/DL (ref ?–150)

## 2025-04-14 PROCEDURE — 83036 HEMOGLOBIN GLYCOSYLATED A1C: CPT

## 2025-04-14 PROCEDURE — 80061 LIPID PANEL: CPT

## 2025-04-14 PROCEDURE — 82306 VITAMIN D 25 HYDROXY: CPT

## 2025-04-14 PROCEDURE — 36415 COLL VENOUS BLD VENIPUNCTURE: CPT

## 2025-04-15 ENCOUNTER — APPOINTMENT (OUTPATIENT)
Dept: LAB | Facility: HOSPITAL | Age: 70
End: 2025-04-15
Attending: FAMILY MEDICINE
Payer: COMMERCIAL

## 2025-04-15 ENCOUNTER — OFFICE VISIT (OUTPATIENT)
Dept: FAMILY MEDICINE CLINIC | Facility: CLINIC | Age: 70
End: 2025-04-15
Payer: COMMERCIAL

## 2025-04-15 VITALS
DIASTOLIC BLOOD PRESSURE: 80 MMHG | RESPIRATION RATE: 16 BRPM | WEIGHT: 204 LBS | SYSTOLIC BLOOD PRESSURE: 134 MMHG | OXYGEN SATURATION: 97 % | HEIGHT: 59 IN | BODY MASS INDEX: 41.12 KG/M2 | HEART RATE: 78 BPM

## 2025-04-15 DIAGNOSIS — Z00.00 ENCOUNTER FOR ANNUAL PHYSICAL EXAM: ICD-10-CM

## 2025-04-15 DIAGNOSIS — E66.01 MORBID OBESITY DUE TO EXCESS CALORIES (HCC): ICD-10-CM

## 2025-04-15 DIAGNOSIS — E11.9 DIABETES MELLITUS WITHOUT COMPLICATION (HCC): Primary | ICD-10-CM

## 2025-04-15 DIAGNOSIS — E78.00 HYPERCHOLESTEROLEMIA: ICD-10-CM

## 2025-04-15 DIAGNOSIS — E11.9 DIABETES MELLITUS WITHOUT COMPLICATION (HCC): ICD-10-CM

## 2025-04-15 DIAGNOSIS — M85.80 OSTEOPENIA, UNSPECIFIED LOCATION: ICD-10-CM

## 2025-04-15 DIAGNOSIS — E55.9 VITAMIN D DEFICIENCY: ICD-10-CM

## 2025-04-15 PROBLEM — R42 DIZZINESS: Status: RESOLVED | Noted: 2023-12-30 | Resolved: 2025-04-15

## 2025-04-15 PROBLEM — K91.2 POSTSURGICAL MALABSORPTION: Status: RESOLVED | Noted: 2024-04-05 | Resolved: 2025-04-15

## 2025-04-15 PROBLEM — D64.9 ACUTE ON CHRONIC ANEMIA: Status: RESOLVED | Noted: 2022-03-28 | Resolved: 2025-04-15

## 2025-04-15 LAB
CREAT UR-MCNC: 85.1 MG/DL
MICROALBUMIN UR-MCNC: 12.2 MG/L
MICROALBUMIN/CREAT 24H UR: 14 MG/G CREATININE (ref 0–30)

## 2025-04-15 PROCEDURE — 99397 PER PM REEVAL EST PAT 65+ YR: CPT | Performed by: FAMILY MEDICINE

## 2025-04-15 PROCEDURE — 99214 OFFICE O/P EST MOD 30 MIN: CPT | Performed by: FAMILY MEDICINE

## 2025-04-15 RX ORDER — ATORVASTATIN CALCIUM 10 MG/1
10 TABLET, FILM COATED ORAL DAILY
Qty: 90 TABLET | Refills: 2 | Status: SHIPPED | OUTPATIENT
Start: 2025-04-15

## 2025-04-15 RX ORDER — FERROUS SULFATE 325(65) MG
325 TABLET, DELAYED RELEASE (ENTERIC COATED) ORAL
COMMUNITY

## 2025-04-15 NOTE — ASSESSMENT & PLAN NOTE
in April 2025. Will start medication and recheck lipids and LFTs in 3 months. Advised pt to follow a low cholesterol diet and to exercise on a regular basis.   Orders:  •  Lipid panel; Future  •  Hepatic function panel; Future  •  atorvastatin (LIPITOR) 10 mg tablet; Take 1 tablet (10 mg total) by mouth daily

## 2025-04-15 NOTE — ASSESSMENT & PLAN NOTE
Last dexascan was in July 2022. T-score was -2.3 in lumbar spine and left femoral neck. Pt advised to take calcium 1200 mg qd and Vit D 1000 U qd. Pt also advised to perform weight-bearing exercise on a regular basis. Patient for follow-up dexascan on 4/18/25.

## 2025-04-15 NOTE — ASSESSMENT & PLAN NOTE
A1C 6.8 in April 2025. Continue metformin  mg bid. Advised pt to follow a low carb diet and to exercise on a regular basis. Foot exam done today. IRIS exam done in April 2024.     Lab Results   Component Value Date    HGBA1C 6.8 (H) 04/14/2025     Orders:  •  Lipid panel; Future  •  Hemoglobin A1C; Future

## 2025-04-15 NOTE — PROGRESS NOTES
Name: Rosalie Pelaez      : 1955      MRN: [id number]  Encounter Provider: Juan Blanca MD  Encounter Date: 4/15/2025   Encounter department: [facility]  :  Assessment & Plan  Encounter for annual physical exam  CPE done. Last Tdap was in . Had COVID vaccines. Recommend Shingrix series and Prevnar 20. Labs are up to date. Had mammogram in 2025 and colonoscopy in 2024. Pt advised to follow a well balanced, heart healthy diet and to exercise on a regular basis. Not a smoker. Blood pressure good.        Diabetes mellitus without complication (HCC)  A1C 6.8 in 2025. Continue metformin  mg bid. Advised pt to follow a low carb diet and to exercise on a regular basis. Foot exam done today. IRIS exam done in 2024.     Lab Results   Component Value Date    HGBA1C 6.8 (H) 2025     Orders:  •  Lipid panel; Future  •  Hemoglobin A1C; Future    Hypercholesterolemia   in 2025. Will start medication and recheck lipids and LFTs in 3 months. Advised pt to follow a low cholesterol diet and to exercise on a regular basis.   Orders:  •  Lipid panel; Future  •  Hepatic function panel; Future  •  atorvastatin (LIPITOR) 10 mg tablet; Take 1 tablet (10 mg total) by mouth daily    Vitamin D deficiency  Level 55.8 in 2025. Alternates 03097 U and 5000 U every other day.       Osteopenia, unspecified location  Last dexascan was in 2022. T-score was -2.3 in lumbar spine and left femoral neck. Pt advised to take calcium 1200 mg qd and Vit D 1000 U qd. Pt also advised to perform weight-bearing exercise on a regular basis. Patient for follow-up dexascan on 25.       Morbid obesity due to excess calories (HCC)  Discussed smaller portions, healthy snacks, and regular exercise.              History of Present Illness   Patient here for follow-up DM, Hyperlipidemia, Osteopenia, Vitamin D deficiency. Patient doing ok. No chest pain or shortness  "of breath. No headaches. No abdominal pain. Patient not exercising and has gained 20 lbs. Takes Ca and vitamin D. For dexascan this week. Had labs done recently. No new complaints. Last Tdap was in 2022. Had COVID vaccines.       Review of Systems   Constitutional:  Negative for activity change, appetite change, fatigue and unexpected weight change.   HENT:  Negative for congestion and sore throat.    Eyes:  Negative for visual disturbance.   Respiratory:  Negative for cough, chest tightness and shortness of breath.    Cardiovascular:  Negative for chest pain, palpitations and leg swelling.   Gastrointestinal:  Negative for abdominal pain, constipation, diarrhea, nausea and vomiting.   Genitourinary:  Negative for difficulty urinating, dysuria, frequency and hematuria.   Musculoskeletal:  Negative for arthralgias, back pain, gait problem and myalgias.   Skin:  Negative for color change, rash and wound.   Neurological:  Negative for dizziness, weakness, light-headedness, numbness and headaches.   Hematological:  Negative for adenopathy. Does not bruise/bleed easily.   Psychiatric/Behavioral:  Negative for dysphoric mood and sleep disturbance. The patient is not nervous/anxious.        Objective   /80 (BP Location: Left arm, Patient Position: Sitting, Cuff Size: Large)   Pulse 78   Resp 16   Ht 4' 11\" (1.499 m)   Wt 92.5 kg (204 lb)   SpO2 97%   BMI 41.20 kg/m²      Physical Exam  Vitals and nursing note reviewed.   Constitutional:       General: She is not in acute distress.     Appearance: Normal appearance. She is well-developed. She is obese. She is not ill-appearing.   HENT:      Head: Normocephalic and atraumatic.      Right Ear: Tympanic membrane, ear canal and external ear normal.      Left Ear: Tympanic membrane, ear canal and external ear normal.      Nose: Nose normal. No congestion or rhinorrhea.      Mouth/Throat:      Mouth: Mucous membranes are moist.      Pharynx: Oropharynx is clear. No " posterior oropharyngeal erythema.   Eyes:      Extraocular Movements: Extraocular movements intact.      Conjunctiva/sclera: Conjunctivae normal.      Pupils: Pupils are equal, round, and reactive to light.   Neck:      Thyroid: No thyromegaly.   Cardiovascular:      Rate and Rhythm: Normal rate and regular rhythm.      Pulses: no weak pulses.           Dorsalis pedis pulses are 2+ on the right side and 2+ on the left side.        Posterior tibial pulses are 2+ on the right side and 2+ on the left side.      Heart sounds: Normal heart sounds. No murmur heard.  Pulmonary:      Effort: Pulmonary effort is normal.      Breath sounds: Normal breath sounds. No wheezing or rhonchi.   Abdominal:      General: Abdomen is flat. Bowel sounds are normal.      Palpations: Abdomen is soft.      Tenderness: There is no abdominal tenderness.   Musculoskeletal:         General: Normal range of motion.      Cervical back: Normal range of motion and neck supple.   Feet:      Right foot:      Skin integrity: No ulcer, skin breakdown, erythema, warmth, callus or dry skin.      Left foot:      Skin integrity: No ulcer, skin breakdown, erythema, warmth, callus or dry skin.   Lymphadenopathy:      Cervical: No cervical adenopathy.   Skin:     General: Skin is warm and dry.      Capillary Refill: Capillary refill takes less than 2 seconds.      Findings: No rash.   Neurological:      Mental Status: She is alert and oriented to person, place, and time.      Cranial Nerves: No cranial nerve deficit.      Sensory: No sensory deficit.   Psychiatric:         Behavior: Behavior normal.         Thought Content: Thought content normal.         Judgment: Judgment normal.     Patient's shoes and socks removed.    Right Foot/Ankle   Right Foot Inspection  Skin Exam: skin normal and skin intact. No dry skin, no warmth, no callus, no erythema, no maceration, no abnormal color, no pre-ulcer, no ulcer and no callus.     Toe Exam: ROM and strength within  normal limits. No swelling, no tenderness, erythema and  no right toe deformity    Sensory   Proprioception: intact  Monofilament testing: intact    Vascular  Capillary refills: < 3 seconds  The right DP pulse is 2+. The right PT pulse is 2+.     Left Foot/Ankle  Left Foot Inspection  Skin Exam: skin normal and skin intact. No dry skin, no warmth, no erythema, no maceration, normal color, no pre-ulcer, no ulcer and no callus.     Toe Exam: ROM and strength within normal limits. No swelling, no tenderness, no erythema and no left toe deformity.     Sensory   Proprioception: intact  Monofilament testing: intact    Vascular  Capillary refills: < 3 seconds  The left DP pulse is 2+. The left PT pulse is 2+.     Assign Risk Category  No deformity present  No loss of protective sensation  No weak pulses  Risk: 0

## 2025-04-15 NOTE — ASSESSMENT & PLAN NOTE
CPE done. Last Tdap was in 2022. Had COVID vaccines. Recommend Shingrix series and Prevnar 20. Labs are up to date. Had mammogram in February 2025 and colonoscopy in April 2024. Pt advised to follow a well balanced, heart healthy diet and to exercise on a regular basis. Not a smoker. Blood pressure good.

## 2025-04-27 ENCOUNTER — HOSPITAL ENCOUNTER (EMERGENCY)
Facility: HOSPITAL | Age: 70
Discharge: HOME | End: 2025-04-27
Attending: INTERNAL MEDICINE
Payer: COMMERCIAL

## 2025-04-27 VITALS
OXYGEN SATURATION: 97 % | DIASTOLIC BLOOD PRESSURE: 74 MMHG | TEMPERATURE: 97.6 F | HEART RATE: 81 BPM | RESPIRATION RATE: 18 BRPM | SYSTOLIC BLOOD PRESSURE: 159 MMHG

## 2025-04-27 DIAGNOSIS — H92.02 EARACHE ON LEFT: Primary | ICD-10-CM

## 2025-04-27 PROCEDURE — 99283 EMERGENCY DEPT VISIT LOW MDM: CPT | Performed by: INTERNAL MEDICINE

## 2025-04-27 PROCEDURE — 99282 EMERGENCY DEPT VISIT SF MDM: CPT

## 2025-04-28 DIAGNOSIS — R52 PAIN: ICD-10-CM

## 2025-04-28 RX ORDER — CYCLOBENZAPRINE HCL 5 MG
5 TABLET ORAL
Qty: 30 TABLET | Refills: 0 | Status: SHIPPED | OUTPATIENT
Start: 2025-04-28

## 2025-04-28 NOTE — ED PROVIDER NOTES
Time reflects when diagnosis was documented in both MDM as applicable and the Disposition within this note       Time User Action Codes Description Comment    4/27/2025 10:40 PM Zara Casanova Add [H92.02] Earache on left           ED Disposition       ED Disposition   Discharge    Condition   Stable    Date/Time   Sun Apr 27, 2025 10:40 PM    Comment   Rosaliejolanta Pelaez discharge to home/self care.                   Assessment & Plan       Medical Decision Making  This is 69 years old came in for having feeling of FB at left ear.  Patient went to her PMD who examined it and told her that he does not find any foreign body.  Patient denies any discharge from the ear, no headache,.  Patient denies decreased hearing.  Patient has history of fibromyalgia, hyperlipidemia, and diabetes.  Physical exam shows no pertinent positive findings.  Examination of the left ear; there is no foreign body, no erythema, TM is intact, the external ear is clean, NO WAX.   Case discussed with the patient and I told him I do not find any foreign body wanted to see ENT doctor for this problem.  Patient discharged in stable condition             Medications - No data to display    ED Risk Strat Scores                    No data recorded        SBIRT 22yo+      Flowsheet Row Most Recent Value   Initial Alcohol Screen: US AUDIT-C     1. How often do you have a drink containing alcohol? 0 Filed at: 04/27/2025 2222   2. How many drinks containing alcohol do you have on a typical day you are drinking?  0 Filed at: 04/27/2025 2222   3b. FEMALE Any Age, or MALE 65+: How often do you have 4 or more drinks on one occassion? 0 Filed at: 04/27/2025 2222   Audit-C Score 0 Filed at: 04/27/2025 2222   HALEY: How many times in the past year have you...    Used an illegal drug or used a prescription medication for non-medical reasons? Never Filed at: 04/27/2025 2222                            History of Present Illness       Chief Complaint   Patient  "presents with    Foreign Body in Ear     Patient concerned for \"something hard\" in L ear. Was seen by PCP for same.        Past Medical History:   Diagnosis Date    Anemia 2020    Diabetes mellitus (HCC)     Fibromyalgia     Hyperlipidemia       Past Surgical History:   Procedure Laterality Date    CHOLECYSTECTOMY      GASTRIC BYPASS      MAMMO (HISTORICAL)  05/2012    MEDIASTINOSCOPY        Family History   Problem Relation Age of Onset    Hypertension Mother     Diabetes Mother     COPD Mother     Stroke Father     Heart disease Father     Diabetes Father     Hypertension Father     Hypertension Maternal Grandmother     Diabetes Maternal Aunt     Lung cancer Other 41      Social History     Tobacco Use    Smoking status: Never    Smokeless tobacco: Never   Vaping Use    Vaping status: Never Used   Substance Use Topics    Alcohol use: Never    Drug use: Never     Comment: Hx: Over 30 years ago.       E-Cigarette/Vaping    E-Cigarette Use Never User       E-Cigarette/Vaping Substances    Nicotine No     THC No     CBD No     Flavoring No     Other No     Unknown No       I have reviewed and agree with the history as documented.     This is a 69 years old came in for having feeling of foreign body on her left ear.  Patient went to her PMD who examined him he had an told her that he does not feeling the any FB.  Patient keeps putting hydrocortisone eardrops.  Patient denies decreased hearing and he denies any discharge from the ear.  Patient denies any headache.  Patient denies any pain but she keeps saying that she has foreign body on the left ear.  Patient denies any dizziness, nausea or vomiting.  Patient has history of fibromyalgia, hypercholesterolemia, diabetes.  Patient denies any chest pain.      History provided by:  Patient   used: No    Foreign Body in Ear  Location:  L ear  Suspected object:  Unable to specify  Pain quality:  Unable to specify  Pain severity:  No pain  Associated " symptoms: no abdominal pain, no congestion, no cough, no drooling, no ear discharge, no ear pain, no sore throat and no vomiting        Review of Systems   Constitutional:  Negative for chills and fever.   HENT:  Negative for congestion, dental problem, drooling, ear discharge, ear pain, facial swelling, sneezing and sore throat.    Eyes:  Negative for pain and visual disturbance.   Respiratory:  Negative for cough and shortness of breath.    Cardiovascular:  Negative for chest pain and palpitations.   Gastrointestinal:  Negative for abdominal pain and vomiting.   Genitourinary:  Negative for dysuria and hematuria.   Musculoskeletal:  Negative for arthralgias and back pain.   Skin:  Negative for color change and rash.   Neurological:  Negative for seizures and syncope.   Psychiatric/Behavioral:  Negative for agitation.    All other systems reviewed and are negative.          Objective       ED Triage Vitals [04/27/25 2220]   Temperature Pulse Blood Pressure Respirations SpO2 Patient Position - Orthostatic VS   97.6 °F (36.4 °C) 81 159/74 18 97 % Lying      Temp Source Heart Rate Source BP Location FiO2 (%) Pain Score    Oral Monitor Right arm -- --      Vitals      Date and Time Temp Pulse SpO2 Resp BP Pain Score FACES Pain Rating User   04/27/25 2220 97.6 °F (36.4 °C) 81 97 % 18 159/74 -- -- DG            Physical Exam  Vitals and nursing note reviewed.   Constitutional:       General: She is not in acute distress.     Appearance: Normal appearance. She is well-developed. She is not ill-appearing, toxic-appearing or diaphoretic.   HENT:      Head: Normocephalic and atraumatic.      Right Ear: Tympanic membrane, ear canal and external ear normal. There is no impacted cerumen.      Left Ear: Tympanic membrane, ear canal and external ear normal. There is no impacted cerumen.      Nose: Nose normal. No congestion or rhinorrhea.      Mouth/Throat:      Mouth: Mucous membranes are moist.      Pharynx: No oropharyngeal  exudate or posterior oropharyngeal erythema.   Eyes:      Conjunctiva/sclera: Conjunctivae normal.   Neck:      Vascular: No carotid bruit.   Cardiovascular:      Rate and Rhythm: Normal rate and regular rhythm.      Heart sounds: No murmur heard.  Pulmonary:      Effort: Pulmonary effort is normal. No respiratory distress.      Breath sounds: Normal breath sounds. No wheezing or rales.   Chest:      Chest wall: No tenderness.   Abdominal:      General: Abdomen is flat. There is no distension.      Palpations: Abdomen is soft. There is no mass.      Tenderness: There is no abdominal tenderness. There is no right CVA tenderness, left CVA tenderness, guarding or rebound.      Hernia: No hernia is present.   Musculoskeletal:         General: No swelling, tenderness, deformity or signs of injury.      Cervical back: Normal range of motion and neck supple. No rigidity or tenderness.      Right lower leg: No edema.      Left lower leg: No edema.   Lymphadenopathy:      Cervical: No cervical adenopathy.   Skin:     General: Skin is warm and dry.      Capillary Refill: Capillary refill takes less than 2 seconds.      Coloration: Skin is not jaundiced or pale.      Findings: No bruising, erythema, lesion or rash.   Neurological:      Mental Status: She is alert and oriented to person, place, and time.   Psychiatric:         Mood and Affect: Mood normal.         Results Reviewed       None            No orders to display       Procedures    ED Medication and Procedure Management   Prior to Admission Medications   Prescriptions Last Dose Informant Patient Reported? Taking?   Calcium Carbonate-Vit D-Min (CALCIUM 1200 PO)  Self Yes No   Sig: Take by mouth   Cholecalciferol (Vitamin D3) 125 MCG (5000 UT) TABS  Self Yes No   Sig: Take 5,000 Units by mouth every other day Alternating 5000 and 43534 every other day   Lancets (onetouch ultrasoft) lancets  Self No No   Sig: Check blood sugar once a day   ascorbic acid (VITAMIN C) 250  mg tablet   No No   Sig: Take 1 tablet (250 mg total) by mouth daily   atorvastatin (LIPITOR) 10 mg tablet   No No   Sig: Take 1 tablet (10 mg total) by mouth daily   cyclobenzaprine (FLEXERIL) 5 mg tablet   No No   Sig: take 1 tablet by mouth daily at bedtime   ferrous sulfate 325 (65 FE) MG EC tablet   Yes No   Sig: Take 325 mg by mouth daily with breakfast   folic acid (FOLVITE) 800 MCG tablet   No No   Sig: Take 1 tablet (800 mcg total) by mouth daily   glucose blood test strip  Self No No   Sig: Check blood sugar once a day   metFORMIN (GLUCOPHAGE-XR) 500 mg 24 hr tablet   No No   Sig: Take 1 tablet by mouth 2 times a day with meals   naproxen (NAPROSYN) 500 mg tablet   No No   Sig: TAKE ONE TABLET BY MOUTH TWICE A DAY WITH MEALS   vitamin A 3 MG (76916 UT) capsule   No No   Sig: Take 1 capsule (10,000 Units total) by mouth daily   vitamin E 100 UNIT capsule   No No   Sig: Take 1 capsule (100 Units total) by mouth daily      Facility-Administered Medications: None     Discharge Medication List as of 4/27/2025 10:42 PM        CONTINUE these medications which have NOT CHANGED    Details   ascorbic acid (VITAMIN C) 250 mg tablet Take 1 tablet (250 mg total) by mouth daily, Starting Tue 4/23/2024, Normal      atorvastatin (LIPITOR) 10 mg tablet Take 1 tablet (10 mg total) by mouth daily, Starting Tue 4/15/2025, Normal      Calcium Carbonate-Vit D-Min (CALCIUM 1200 PO) Take by mouth, Historical Med      Cholecalciferol (Vitamin D3) 125 MCG (5000 UT) TABS Take 5,000 Units by mouth every other day Alternating 5000 and 96440 every other day, Historical Med      cyclobenzaprine (FLEXERIL) 5 mg tablet take 1 tablet by mouth daily at bedtime, Starting Wed 3/26/2025, Normal      ferrous sulfate 325 (65 FE) MG EC tablet Take 325 mg by mouth daily with breakfast, Historical Med      folic acid (FOLVITE) 800 MCG tablet Take 1 tablet (800 mcg total) by mouth daily, Starting Fri 4/5/2024, Normal      glucose blood test strip  Check blood sugar once a day, Normal      Lancets (onetouch ultrasoft) lancets Check blood sugar once a day, Normal      metFORMIN (GLUCOPHAGE-XR) 500 mg 24 hr tablet Take 1 tablet by mouth 2 times a day with meals, Normal      naproxen (NAPROSYN) 500 mg tablet TAKE ONE TABLET BY MOUTH TWICE A DAY WITH MEALS, Starting Tue 3/18/2025, Normal      vitamin A 3 MG (64532 UT) capsule Take 1 capsule (10,000 Units total) by mouth daily, Starting Tue 4/23/2024, Normal      vitamin E 100 UNIT capsule Take 1 capsule (100 Units total) by mouth daily, Starting Tue 4/23/2024, Normal           No discharge procedures on file.  ED SEPSIS DOCUMENTATION   Time reflects when diagnosis was documented in both MDM as applicable and the Disposition within this note       Time User Action Codes Description Comment    4/27/2025 10:40 PM Zara Casanova Add [H92.02] Earache on left                  Zara Casanova MD  04/28/25 1002

## 2025-05-28 DIAGNOSIS — R52 PAIN: ICD-10-CM

## 2025-05-29 RX ORDER — CYCLOBENZAPRINE HCL 5 MG
5 TABLET ORAL
Qty: 30 TABLET | Refills: 0 | Status: SHIPPED | OUTPATIENT
Start: 2025-05-29

## 2025-06-26 DIAGNOSIS — R52 PAIN: ICD-10-CM

## 2025-06-30 RX ORDER — CYCLOBENZAPRINE HCL 5 MG
5 TABLET ORAL
Qty: 30 TABLET | Refills: 0 | Status: SHIPPED | OUTPATIENT
Start: 2025-06-30

## 2025-07-10 ENCOUNTER — TELEPHONE (OUTPATIENT)
Age: 70
End: 2025-07-10

## 2025-07-10 NOTE — TELEPHONE ENCOUNTER
Patient called to make Dr Blanca aware she accidentally took 2 (two) Metformin 500 mg tablets this morning.  She normally takes 1 tablet twice a day.  Please call her to advise.

## 2025-07-28 DIAGNOSIS — R52 PAIN: ICD-10-CM

## 2025-07-30 RX ORDER — CYCLOBENZAPRINE HCL 5 MG
5 TABLET ORAL
Qty: 30 TABLET | Refills: 0 | Status: SHIPPED | OUTPATIENT
Start: 2025-07-30

## 2025-08-13 ENCOUNTER — APPOINTMENT (OUTPATIENT)
Dept: LAB | Facility: CLINIC | Age: 70
End: 2025-08-13
Payer: COMMERCIAL

## 2025-08-13 DIAGNOSIS — E78.00 HYPERCHOLESTEROLEMIA: ICD-10-CM

## 2025-08-13 DIAGNOSIS — E11.9 DIABETES MELLITUS WITHOUT COMPLICATION (HCC): ICD-10-CM

## 2025-08-13 LAB
ALBUMIN SERPL BCG-MCNC: 3.7 G/DL (ref 3.5–5)
ALP SERPL-CCNC: 96 U/L (ref 34–104)
ALT SERPL W P-5'-P-CCNC: 11 U/L (ref 7–52)
AST SERPL W P-5'-P-CCNC: 18 U/L (ref 13–39)
BILIRUB DIRECT SERPL-MCNC: 0.12 MG/DL (ref 0–0.2)
BILIRUB SERPL-MCNC: 0.42 MG/DL (ref 0.2–1)
CHOLEST SERPL-MCNC: 201 MG/DL (ref ?–200)
EST. AVERAGE GLUCOSE BLD GHB EST-MCNC: 169 MG/DL
HBA1C MFR BLD: 7.5 %
HDLC SERPL-MCNC: 60 MG/DL
LDLC SERPL CALC-MCNC: 113 MG/DL (ref 0–100)
NONHDLC SERPL-MCNC: 141 MG/DL
PROT SERPL-MCNC: 7 G/DL (ref 6.4–8.4)
TRIGL SERPL-MCNC: 142 MG/DL (ref ?–150)

## 2025-08-13 PROCEDURE — 80061 LIPID PANEL: CPT

## 2025-08-13 PROCEDURE — 36415 COLL VENOUS BLD VENIPUNCTURE: CPT

## 2025-08-13 PROCEDURE — 83036 HEMOGLOBIN GLYCOSYLATED A1C: CPT

## 2025-08-13 PROCEDURE — 80076 HEPATIC FUNCTION PANEL: CPT
